# Patient Record
Sex: FEMALE | Race: WHITE | NOT HISPANIC OR LATINO | Employment: FULL TIME | URBAN - METROPOLITAN AREA
[De-identification: names, ages, dates, MRNs, and addresses within clinical notes are randomized per-mention and may not be internally consistent; named-entity substitution may affect disease eponyms.]

---

## 2021-08-04 ENCOUNTER — OFFICE VISIT (OUTPATIENT)
Dept: FAMILY MEDICINE CLINIC | Facility: CLINIC | Age: 61
End: 2021-08-04
Payer: COMMERCIAL

## 2021-08-04 VITALS
DIASTOLIC BLOOD PRESSURE: 88 MMHG | SYSTOLIC BLOOD PRESSURE: 130 MMHG | HEIGHT: 57 IN | RESPIRATION RATE: 16 BRPM | TEMPERATURE: 97.3 F | WEIGHT: 179 LBS | HEART RATE: 94 BPM | BODY MASS INDEX: 38.62 KG/M2 | OXYGEN SATURATION: 95 %

## 2021-08-04 DIAGNOSIS — Z12.11 COLON CANCER SCREENING: ICD-10-CM

## 2021-08-04 DIAGNOSIS — Z00.00 HEALTHCARE MAINTENANCE: Primary | ICD-10-CM

## 2021-08-04 DIAGNOSIS — I10 HYPERTENSION, UNSPECIFIED TYPE: ICD-10-CM

## 2021-08-04 PROCEDURE — 99386 PREV VISIT NEW AGE 40-64: CPT | Performed by: NURSE PRACTITIONER

## 2021-08-04 PROCEDURE — 3008F BODY MASS INDEX DOCD: CPT | Performed by: NURSE PRACTITIONER

## 2021-08-04 PROCEDURE — 3725F SCREEN DEPRESSION PERFORMED: CPT | Performed by: NURSE PRACTITIONER

## 2021-08-04 RX ORDER — LOSARTAN POTASSIUM 100 MG/1
100 TABLET ORAL DAILY
COMMUNITY
End: 2021-08-04 | Stop reason: SDUPTHER

## 2021-08-04 RX ORDER — HYDROCHLOROTHIAZIDE 25 MG/1
25 TABLET ORAL DAILY
COMMUNITY
End: 2021-08-04 | Stop reason: SDUPTHER

## 2021-08-04 RX ORDER — HYDROCHLOROTHIAZIDE 25 MG/1
25 TABLET ORAL DAILY
Qty: 90 TABLET | Refills: 0 | Status: SHIPPED | OUTPATIENT
Start: 2021-08-04 | End: 2021-11-23 | Stop reason: SDUPTHER

## 2021-08-04 RX ORDER — LOSARTAN POTASSIUM 100 MG/1
100 TABLET ORAL DAILY
Qty: 90 TABLET | Refills: 0 | Status: SHIPPED | OUTPATIENT
Start: 2021-08-04 | End: 2021-11-23 | Stop reason: SDUPTHER

## 2021-08-04 NOTE — ASSESSMENT & PLAN NOTE
-  Discussed immunizations, screening, healthy diet, exercise, and safety measures  -  She did get the [address] vaccine  -  Discussed importance of regular dental and vision exams   -  She has not been to the gynecologist in a few years but states she is going to make an appointment  She states that she is going to ask them for a script for a mammogram   -  Fit test ordered for colon cancer screening   -  Routine blood work ordered  Will contact patient with results

## 2021-08-04 NOTE — ASSESSMENT & PLAN NOTE
-  Well controlled on losartan and hydrochlorothiazide  Refill sent  -  Continue monitoring blood pressure at home  -  Will continue to monitor

## 2021-08-04 NOTE — PROGRESS NOTES
Assessment/Plan:    Healthcare maintenance  -  Discussed immunizations, screening, healthy diet, exercise, and safety measures  -  She did get the [address] vaccine  -  Discussed importance of regular dental and vision exams   -  She has not been to the gynecologist in a few years but states she is going to make an appointment  She states that she is going to ask them for a script for a mammogram   -  Fit test ordered for colon cancer screening   -  Routine blood work ordered  Will contact patient with results  Hypertension  -  Well controlled on losartan and hydrochlorothiazide  Refill sent  -  Continue monitoring blood pressure at home  -  Will continue to monitor  Diagnoses and all orders for this visit:    Healthcare maintenance  -     CBC and differential; Future  -     Comprehensive metabolic panel; Future  -     Lipid Panel with Direct LDL reflex; Future  -     TSH, 3rd generation with Free T4 reflex; Future    Hypertension, unspecified type  -     hydrochlorothiazide (HYDRODIURIL) 25 mg tablet; Take 1 tablet (25 mg total) by mouth daily  -     losartan (COZAAR) 100 MG tablet; Take 1 tablet (100 mg total) by mouth daily    Colon cancer screening  -     Occult Blood, Fecal Immunochemical; Future    Other orders  -     Discontinue: losartan (COZAAR) 100 MG tablet; Take 100 mg by mouth daily  -     Discontinue: hydrochlorothiazide (HYDRODIURIL) 25 mg tablet; Take 25 mg by mouth daily        Subjective:      Patient ID: Ajit Figueroa is a 59 y o  female  Patient with past medical history of hypertension presents today to establish care  She states that she does check her blood pressure at home and reports that her readings are controlled  She takes her medications daily and reports no side effects  She has no concerns or complaints today  M*Modal software was used to dictate this note  It may contain errors with dictating incorrect words/spelling   Please contact provider directly for any questions  The following portions of the patient's history were reviewed and updated as appropriate: allergies, current medications, past family history, past medical history, past social history, past surgical history and problem list     Review of Systems   Constitutional: Negative for fatigue and fever  HENT: Negative for congestion, rhinorrhea and trouble swallowing  Eyes: Negative for pain and visual disturbance  Respiratory: Negative for cough and shortness of breath  Cardiovascular: Negative for chest pain and palpitations  Gastrointestinal: Negative for abdominal pain and blood in stool  Endocrine: Negative for cold intolerance and heat intolerance  Genitourinary: Negative for difficulty urinating, dysuria and hematuria  Musculoskeletal: Negative for gait problem  Skin: Negative for rash  Neurological: Negative for dizziness, syncope and headaches  Hematological: Negative for adenopathy  Psychiatric/Behavioral: Negative for behavioral problems  Objective:      /88 (BP Location: Left arm, Patient Position: Sitting, Cuff Size: Adult)   Pulse 94   Temp (!) 97 3 °F (36 3 °C) (Tympanic)   Resp 16   Ht 4' 9" (1 448 m)   Wt 81 2 kg (179 lb)   SpO2 95%   BMI 38 74 kg/m²          Physical Exam  Vitals and nursing note reviewed  Constitutional:       Appearance: Normal appearance  HENT:      Head: Normocephalic and atraumatic  Right Ear: Tympanic membrane, ear canal and external ear normal       Left Ear: Tympanic membrane, ear canal and external ear normal       Nose: No congestion  Eyes:      Extraocular Movements: Extraocular movements intact  Conjunctiva/sclera: Conjunctivae normal       Pupils: Pupils are equal, round, and reactive to light  Cardiovascular:      Rate and Rhythm: Normal rate and regular rhythm  Heart sounds: Normal heart sounds     Pulmonary:      Effort: Pulmonary effort is normal       Breath sounds: Normal breath sounds  Abdominal:      General: Bowel sounds are normal       Palpations: Abdomen is soft  Tenderness: There is no abdominal tenderness  Musculoskeletal:         General: Normal range of motion  Cervical back: Normal range of motion  Right lower leg: No edema  Left lower leg: No edema  Lymphadenopathy:      Cervical: No cervical adenopathy  Skin:     General: Skin is warm and dry  Neurological:      Mental Status: She is alert and oriented to person, place, and time  Cranial Nerves: No cranial nerve deficit  Psychiatric:         Mood and Affect: Mood normal          Behavior: Behavior normal        BMI Counseling: Body mass index is 38 74 kg/m²  The BMI is above normal  Nutrition recommendations include decreasing portion sizes, encouraging healthy choices of fruits and vegetables, decreasing fast food intake and reducing intake of cholesterol  Exercise recommendations include moderate physical activity 150 minutes/week and exercising 3-5 times per week

## 2021-09-03 ENCOUNTER — TELEPHONE (OUTPATIENT)
Dept: FAMILY MEDICINE CLINIC | Facility: CLINIC | Age: 61
End: 2021-09-03

## 2021-09-03 DIAGNOSIS — Z12.31 SCREENING MAMMOGRAM, ENCOUNTER FOR: Primary | ICD-10-CM

## 2021-09-03 NOTE — TELEPHONE ENCOUNTER
Phone call from Fredy Lazo, returning [place] call  I advised her of Nadias message to get her BW & stool test done  Fredy Lazo stated that [facility] told her to get this done prior to her appt in 6 mo  I double checked with [facility] & she said that was ok  Then Fredy Lazo mentioned that she only got her prescriptions for 90 days, so she is going to go for her BW prior to her prescription running out

## 2021-10-18 ENCOUNTER — TELEPHONE (OUTPATIENT)
Dept: FAMILY MEDICINE CLINIC | Facility: CLINIC | Age: 61
End: 2021-10-18

## 2021-11-23 DIAGNOSIS — I10 HYPERTENSION, UNSPECIFIED TYPE: ICD-10-CM

## 2021-11-23 RX ORDER — LOSARTAN POTASSIUM 100 MG/1
100 TABLET ORAL DAILY
Qty: 90 TABLET | Refills: 0 | Status: SHIPPED | OUTPATIENT
Start: 2021-11-23 | End: 2022-05-16 | Stop reason: SDUPTHER

## 2021-11-23 RX ORDER — HYDROCHLOROTHIAZIDE 25 MG/1
25 TABLET ORAL DAILY
Qty: 90 TABLET | Refills: 0 | Status: SHIPPED | OUTPATIENT
Start: 2021-11-23 | End: 2022-05-16 | Stop reason: SDUPTHER

## 2022-05-05 ENCOUNTER — RA CDI HCC (OUTPATIENT)
Dept: OTHER | Facility: HOSPITAL | Age: 62
End: 2022-05-05

## 2022-05-05 NOTE — PROGRESS NOTES
Please review if the following dx  is applicable to the patient's condition and assess and document, if applicable in next visit on 05/13/2022    E66 01: Morbid (severe) obesity due to excess calories ([address] ) -     Per CMS/ICD 10 coding guidelines, BMI of 40 or higher; Class 3 obesity is sometimes categorized as "morbid," "extreme," or "severe" obesity      [address]  coding opportunities          Chart Reviewed number of suggestions sent to Provider: 1     Patients Insurance        Commercial Insurance: [employer]

## 2022-05-13 ENCOUNTER — OFFICE VISIT (OUTPATIENT)
Dept: FAMILY MEDICINE CLINIC | Facility: CLINIC | Age: 62
End: 2022-05-13
Payer: COMMERCIAL

## 2022-05-13 VITALS
TEMPERATURE: 98.4 F | DIASTOLIC BLOOD PRESSURE: 84 MMHG | BODY MASS INDEX: 35.51 KG/M2 | WEIGHT: 164.6 LBS | SYSTOLIC BLOOD PRESSURE: 128 MMHG | RESPIRATION RATE: 16 BRPM | OXYGEN SATURATION: 96 % | HEIGHT: 57 IN | HEART RATE: 96 BPM

## 2022-05-13 DIAGNOSIS — I10 HYPERTENSION, UNSPECIFIED TYPE: Primary | ICD-10-CM

## 2022-05-13 PROCEDURE — 99213 OFFICE O/P EST LOW 20 MIN: CPT | Performed by: NURSE PRACTITIONER

## 2022-05-13 PROCEDURE — 3725F SCREEN DEPRESSION PERFORMED: CPT | Performed by: NURSE PRACTITIONER

## 2022-05-13 PROCEDURE — 3008F BODY MASS INDEX DOCD: CPT | Performed by: NURSE PRACTITIONER

## 2022-05-13 NOTE — ASSESSMENT & PLAN NOTE
- Well controlled on losartan and hydrochlorothiazide  Continue same    - Continue to monitor blood pressure at home

## 2022-05-13 NOTE — PROGRESS NOTES
Assessment/Plan:    Hypertension  - Well controlled on losartan and hydrochlorothiazide  Continue same    - Continue to monitor blood pressure at home  Diagnoses and all orders for this visit:    Hypertension, unspecified type        Subjective:      Patient ID: Landy Manuel is a 59 y o  female  Patient presents today for follow up appointment  She has a history of hypertension and is taking losartan and hydrochlorothiazide  Her blood pressure is well controlled today  She does monitor her blood pressure at home and reports readings of 695'D systolic and 26'J diastolic  She is making an effort to lose weight to hopefully come off of some of her medication  She has lost 15 pounds since she was seen last in August          The following portions of the patient's history were reviewed and updated as appropriate: allergies, current medications, past family history, past medical history, past social history, past surgical history and problem list     Review of Systems   Constitutional: Negative for fatigue and fever  HENT: Negative for trouble swallowing  Eyes: Negative for visual disturbance  Respiratory: Negative for cough and shortness of breath  Cardiovascular: Negative for chest pain and palpitations  Gastrointestinal: Negative for abdominal pain and blood in stool  Endocrine: Negative for cold intolerance and heat intolerance  Genitourinary: Negative for difficulty urinating and dysuria  Musculoskeletal: Negative for gait problem  Skin: Negative for rash  Neurological: Negative for dizziness, syncope and headaches  Hematological: Negative for adenopathy  Psychiatric/Behavioral: Negative for behavioral problems           Objective:      /84 (BP Location: Left arm, Patient Position: Sitting, Cuff Size: Large)   Pulse 96   Temp 98 4 °F (36 9 °C) (Tympanic)   Resp 16   Ht 4' 9" (1 448 m)   Wt 74 7 kg (164 lb 9 6 oz)   SpO2 96%   BMI 35 62 kg/m²          Physical Exam  Vitals and nursing note reviewed  Constitutional:       Appearance: Normal appearance  HENT:      Head: Normocephalic and atraumatic  Right Ear: External ear normal       Left Ear: External ear normal    Eyes:      Conjunctiva/sclera: Conjunctivae normal    Cardiovascular:      Rate and Rhythm: Normal rate and regular rhythm  Heart sounds: Normal heart sounds  Pulmonary:      Effort: Pulmonary effort is normal       Breath sounds: Normal breath sounds  Musculoskeletal:         General: Normal range of motion  Cervical back: Normal range of motion  Skin:     General: Skin is warm and dry  Neurological:      Mental Status: She is alert and oriented to person, place, and time  Cranial Nerves: No cranial nerve deficit     Psychiatric:         Mood and Affect: Mood normal          Behavior: Behavior normal

## 2022-05-16 DIAGNOSIS — I10 HYPERTENSION, UNSPECIFIED TYPE: ICD-10-CM

## 2022-05-16 RX ORDER — LOSARTAN POTASSIUM 100 MG/1
100 TABLET ORAL DAILY
Qty: 90 TABLET | Refills: 1 | Status: SHIPPED | OUTPATIENT
Start: 2022-05-16

## 2022-05-16 RX ORDER — HYDROCHLOROTHIAZIDE 25 MG/1
25 TABLET ORAL DAILY
Qty: 90 TABLET | Refills: 1 | Status: SHIPPED | OUTPATIENT
Start: 2022-05-16

## 2022-10-11 ENCOUNTER — VBI (OUTPATIENT)
Dept: ADMINISTRATIVE | Facility: OTHER | Age: 62
End: 2022-10-11

## 2022-10-12 PROBLEM — Z00.00 HEALTHCARE MAINTENANCE: Status: RESOLVED | Noted: 2021-08-04 | Resolved: 2022-10-12

## 2023-01-27 ENCOUNTER — RA CDI HCC (OUTPATIENT)
Dept: OTHER | Facility: HOSPITAL | Age: 63
End: 2023-01-27

## 2023-01-27 NOTE — PROGRESS NOTES
[facility]  coding opportunities       Chart reviewed, no opportunity found: CHART REVIEWED, NO OPPORTUNITY FOUND        Patients Insurance        Commercial Insurance: [address] [place] [address]

## 2023-02-03 ENCOUNTER — OFFICE VISIT (OUTPATIENT)
Dept: FAMILY MEDICINE CLINIC | Facility: CLINIC | Age: 63
End: 2023-02-03

## 2023-02-03 VITALS
DIASTOLIC BLOOD PRESSURE: 70 MMHG | RESPIRATION RATE: 16 BRPM | BODY MASS INDEX: 31.93 KG/M2 | HEIGHT: 57 IN | HEART RATE: 102 BPM | TEMPERATURE: 98.2 F | OXYGEN SATURATION: 97 % | SYSTOLIC BLOOD PRESSURE: 112 MMHG | WEIGHT: 148 LBS

## 2023-02-03 DIAGNOSIS — K59.00 CONSTIPATION, UNSPECIFIED CONSTIPATION TYPE: ICD-10-CM

## 2023-02-03 DIAGNOSIS — Z00.00 HEALTHCARE MAINTENANCE: ICD-10-CM

## 2023-02-03 DIAGNOSIS — Z12.11 ENCOUNTER FOR SCREENING FOR MALIGNANT NEOPLASM OF COLON: ICD-10-CM

## 2023-02-03 DIAGNOSIS — R30.9 PAINFUL URINATION: ICD-10-CM

## 2023-02-03 DIAGNOSIS — K64.9 HEMORRHOIDS, UNSPECIFIED HEMORRHOID TYPE: ICD-10-CM

## 2023-02-03 DIAGNOSIS — Z12.31 ENCOUNTER FOR SCREENING MAMMOGRAM FOR MALIGNANT NEOPLASM OF BREAST: ICD-10-CM

## 2023-02-03 DIAGNOSIS — I10 HYPERTENSION, UNSPECIFIED TYPE: Primary | ICD-10-CM

## 2023-02-03 LAB
BACTERIA UR QL AUTO: ABNORMAL /HPF
BILIRUB UR QL STRIP: NEGATIVE
CLARITY UR: ABNORMAL
COLOR UR: YELLOW
GLUCOSE UR STRIP-MCNC: NEGATIVE MG/DL
HGB UR QL STRIP.AUTO: NEGATIVE
KETONES UR STRIP-MCNC: NEGATIVE MG/DL
LEUKOCYTE ESTERASE UR QL STRIP: NEGATIVE
MUCOUS THREADS UR QL AUTO: ABNORMAL
NITRITE UR QL STRIP: NEGATIVE
NON-SQ EPI CELLS URNS QL MICRO: ABNORMAL /HPF
PH UR STRIP.AUTO: 6.5 [PH]
PROT UR STRIP-MCNC: ABNORMAL MG/DL
RBC #/AREA URNS AUTO: ABNORMAL /HPF
SL AMB  POCT GLUCOSE, UA: ABNORMAL
SL AMB LEUKOCYTE ESTERASE,UA: ABNORMAL
SL AMB POCT BILIRUBIN,UA: ABNORMAL
SL AMB POCT BLOOD,UA: ABNORMAL
SL AMB POCT CLARITY,UA: CLEAR
SL AMB POCT COLOR,UA: ABNORMAL
SL AMB POCT KETONES,UA: ABNORMAL
SL AMB POCT NITRITE,UA: ABNORMAL
SL AMB POCT PH,UA: 6
SL AMB POCT SPECIFIC GRAVITY,UA: 1.02
SL AMB POCT URINE PROTEIN: ABNORMAL
SL AMB POCT UROBILINOGEN: POSITIVE
SP GR UR STRIP.AUTO: 1.02 (ref 1–1.03)
UROBILINOGEN UR STRIP-ACNC: 3 MG/DL
WBC #/AREA URNS AUTO: ABNORMAL /HPF

## 2023-02-03 RX ORDER — LOSARTAN POTASSIUM 100 MG/1
100 TABLET ORAL DAILY
Qty: 90 TABLET | Refills: 1 | Status: SHIPPED | OUTPATIENT
Start: 2023-02-03 | End: 2023-02-06

## 2023-02-03 RX ORDER — HYDROCORTISONE 25 MG/G
CREAM TOPICAL 2 TIMES DAILY
Qty: 28 G | Refills: 1 | Status: SHIPPED | OUTPATIENT
Start: 2023-02-03 | End: 2023-02-09 | Stop reason: SDUPTHER

## 2023-02-03 RX ORDER — HYDROCHLOROTHIAZIDE 25 MG/1
25 TABLET ORAL DAILY
Qty: 90 TABLET | Refills: 1 | Status: SHIPPED | OUTPATIENT
Start: 2023-02-03 | End: 2023-02-06

## 2023-02-03 RX ORDER — HYDROCORTISONE ACETATE 25 MG/1
25 SUPPOSITORY RECTAL 2 TIMES DAILY
Qty: 12 SUPPOSITORY | Refills: 0 | Status: SHIPPED | OUTPATIENT
Start: 2023-02-03 | End: 2023-02-09 | Stop reason: SDUPTHER

## 2023-02-03 NOTE — ASSESSMENT & PLAN NOTE
- Recommended use of Colace and Miralax  - Increase oral hydration and fiber in diet  - Contact office if no improvement

## 2023-02-03 NOTE — ASSESSMENT & PLAN NOTE
- Prescription sent for Anusol cream and suppository  - Recommended Colace and increased oral hydration    - Contact office if no improvement

## 2023-02-03 NOTE — ASSESSMENT & PLAN NOTE
- Well controlled on losartan and hydrochlorothiazide daily  Continue same    - Will continue to monitor

## 2023-02-03 NOTE — ASSESSMENT & PLAN NOTE
- Reviewed immunizations and screenings  - Discussed routine dental, vision, and GYN exams  Needs tooth extractions  Needs new GYN  Wants to wait until she gets Medicare in June  - Routine labs, mammogram, and cologuard ordered

## 2023-02-03 NOTE — PROGRESS NOTES
Name: Greta Petersen      : 1960      MRN: [id number]  Encounter Provider: FRANCISCA Cortez  Encounter Date: 2/3/2023   Encounter department[facility]  PRIMARY CARE    Assessment & Plan     1  Hypertension, unspecified type  Assessment & Plan:  - Well controlled on losartan and hydrochlorothiazide daily  Continue same    - Will continue to monitor  Orders:  -     losartan (COZAAR) 100 MG tablet; Take 1 tablet (100 mg total) by mouth daily  -     hydrochlorothiazide (HYDRODIURIL) 25 mg tablet; Take 1 tablet (25 mg total) by mouth daily  -     CBC and differential; Future  -     Comprehensive metabolic panel; Future  -     Lipid Panel with Direct LDL reflex; Future  -     TSH, 3rd generation with Free T4 reflex; Future    2  Hemorrhoids, unspecified hemorrhoid type  Assessment & Plan:  - Prescription sent for Anusol cream and suppository  - Recommended Colace and increased oral hydration    - Contact office if no improvement  Orders:  -     hydrocortisone (ANUSOL-HC) 2 5 % rectal cream; Apply topically 2 (two) times a day  -     hydrocortisone (ANUSOL-HC) 25 mg suppository; Insert 1 suppository (25 mg total) into the rectum 2 (two) times a day    3  Painful urination  Assessment & Plan:  - Urine dip in office unremarkable  Will send for culture and continue to follow up with results  Orders:  -     POCT urine dip  -     UA w Reflex to Microscopic w Reflex to Culture - Clinic Collect  -     Urine culture; Future  -     Urine culture    4  Constipation, unspecified constipation type  Assessment & Plan:  - Recommended use of Colace and Miralax  - Increase oral hydration and fiber in diet  - Contact office if no improvement  5  Encounter for screening mammogram for malignant neoplasm of breast  -     Mammo screening bilateral w 3d & cad; Future; Expected date: 2023    6  Encounter for screening for malignant neoplasm of colon  -     Cologuard    7  Healthcare maintenance  Assessment & Plan:  - Reviewed immunizations and screenings  - Discussed routine dental, vision, and GYN exams  Needs tooth extractions  Needs new GYN  Wants to wait until she gets Medicare in June  - Routine labs, mammogram, and cologuard ordered  Orders:  -     CBC and differential; Future  -     Comprehensive metabolic panel; Future  -     Lipid Panel with Direct LDL reflex; Future  -     TSH, 3rd generation with Free T4 reflex; Future           Subjective     Patient with PMH of hypertension presents today for physical  She is taking her prescribed medication and reports no side effects  She has complaints today of hemorrhoids, constipation, and pelvic pain  Has had the pelvic pain for about 2 weeks  It is sharp in nature  On right side only  States it hurts when she starts and ends her urine stream  Denies urgency, frequency, fever, chills, or flank pain  Has had some weight loss recently due to diet changes  Has been unable to eat hard foods due to teeth issues  Has to make an appt for tooth extractions  Also needs blood work  Currently her insurance has a $6,000 deductible  She gets Medicare on June 1st and is planning on getting labs and testing done after that  She denies any other concerns or complaints today  Review of Systems   Constitutional: Negative for fatigue and fever  HENT: Positive for dental problem  Negative for postnasal drip and trouble swallowing  Eyes: Negative for visual disturbance  Respiratory: Negative for cough and shortness of breath  Cardiovascular: Negative for chest pain and palpitations  Gastrointestinal: Positive for constipation  Negative for abdominal pain and blood in stool  Hemorrhoids    Endocrine: Negative for cold intolerance and heat intolerance  Genitourinary: Positive for dysuria and pelvic pain  Negative for decreased urine volume, difficulty urinating, flank pain, frequency and urgency     Musculoskeletal: Negative for gait problem  Skin: Negative for rash  Neurological: Negative for dizziness, syncope and headaches  Hematological: Negative for adenopathy  Psychiatric/Behavioral: Negative for behavioral problems  Past Medical History:   Diagnosis Date   • Hypertension      History reviewed  No pertinent surgical history  History reviewed  No pertinent family history  Social History     Socioeconomic History   • Marital status: /Civil Union     Spouse name: None   • Number of children: None   • Years of education: None   • Highest education level: None   Occupational History   • None   Tobacco Use   • Smoking status: Every Day     Packs/day: 1 00     Types: Cigarettes   • Smokeless tobacco: Never   Vaping Use   • Vaping Use: Never used   Substance and Sexual Activity   • Alcohol use: Never   • Drug use: Never   • Sexual activity: None   Other Topics Concern   • None   Social History Narrative   • None     Social Determinants of Health     Financial Resource Strain: Not on file   Food Insecurity: Not on file   Transportation Needs: Not on file   Physical Activity: Not on file   Stress: Not on file   Social Connections: Not on file   Intimate Partner Violence: Not on file   Housing Stability: Not on file     Current Outpatient Medications on File Prior to Visit   Medication Sig   • [DISCONTINUED] hydrochlorothiazide (HYDRODIURIL) 25 mg tablet Take 1 tablet (25 mg total) by mouth in the morning  • [DISCONTINUED] losartan (COZAAR) 100 MG tablet Take 1 tablet (100 mg total) by mouth in the morning       Allergies   Allergen Reactions   • Acetaminophen Hives   • Penicillins Hives     Immunization History   Administered Date(s) Administered   • COVID-19 MODERNA VACC 0 5 ML IM 03/27/2021, 12/17/2021       Objective     /70 (BP Location: Left arm, Patient Position: Sitting, Cuff Size: Large)   Pulse 102   Temp 98 2 °F (36 8 °C) (Tympanic)   Resp 16   Ht 4' 9" (1 448 m)   Wt 67 1 kg (148 lb) SpO2 97%   BMI 32 03 kg/m²     Physical Exam  Vitals and nursing note reviewed  Constitutional:       General: She is not in acute distress  Appearance: Normal appearance  She is not ill-appearing  HENT:      Head: Normocephalic and atraumatic  Right Ear: Tympanic membrane, ear canal and external ear normal       Left Ear: Tympanic membrane, ear canal and external ear normal       Nose: No congestion  Eyes:      Extraocular Movements: Extraocular movements intact  Conjunctiva/sclera: Conjunctivae normal       Pupils: Pupils are equal, round, and reactive to light  Cardiovascular:      Rate and Rhythm: Normal rate and regular rhythm  Heart sounds: Normal heart sounds  Pulmonary:      Effort: Pulmonary effort is normal       Breath sounds: Normal breath sounds  Abdominal:      General: Bowel sounds are normal       Palpations: Abdomen is soft  Tenderness: There is abdominal tenderness in the suprapubic area  Negative signs include McBurney's sign  Musculoskeletal:         General: Normal range of motion  Cervical back: Normal range of motion  Lymphadenopathy:      Cervical: No cervical adenopathy  Skin:     General: Skin is warm and dry  Neurological:      Mental Status: She is alert and oriented to person, place, and time  Cranial Nerves: No cranial nerve deficit     Psychiatric:         Mood and Affect: Mood normal          Behavior: Behavior normal        FRANCISCA Abel

## 2023-02-04 DIAGNOSIS — I10 HYPERTENSION, UNSPECIFIED TYPE: ICD-10-CM

## 2023-02-04 LAB — BACTERIA UR CULT: NORMAL

## 2023-02-06 RX ORDER — HYDROCHLOROTHIAZIDE 25 MG/1
TABLET ORAL
Qty: 90 TABLET | Refills: 1 | Status: SHIPPED | OUTPATIENT
Start: 2023-02-06

## 2023-02-06 RX ORDER — LOSARTAN POTASSIUM 100 MG/1
TABLET ORAL
Qty: 90 TABLET | Refills: 1 | Status: SHIPPED | OUTPATIENT
Start: 2023-02-06

## 2023-02-07 ENCOUNTER — TELEPHONE (OUTPATIENT)
Dept: FAMILY MEDICINE CLINIC | Facility: CLINIC | Age: 63
End: 2023-02-07

## 2023-02-07 NOTE — TELEPHONE ENCOUNTER
----- [facility] at 2/7/2023  7:05 AM EST -----  Urine culture showed no UTI

## 2023-02-09 ENCOUNTER — TELEPHONE (OUTPATIENT)
Dept: FAMILY MEDICINE CLINIC | Facility: CLINIC | Age: 63
End: 2023-02-09

## 2023-02-09 DIAGNOSIS — K64.9 HEMORRHOIDS, UNSPECIFIED HEMORRHOID TYPE: ICD-10-CM

## 2023-02-09 RX ORDER — HYDROCORTISONE 25 MG/G
CREAM TOPICAL 2 TIMES DAILY
Qty: 28 G | Refills: 1 | Status: SHIPPED | OUTPATIENT
Start: 2023-02-09

## 2023-02-09 RX ORDER — HYDROCORTISONE ACETATE 25 MG/1
25 SUPPOSITORY RECTAL 2 TIMES DAILY
Qty: 12 SUPPOSITORY | Refills: 0 | Status: SHIPPED | OUTPATIENT
Start: 2023-02-09

## 2023-02-09 NOTE — TELEPHONE ENCOUNTER
Two prescriptions were sent in error to [facility] in [facility] instead of [facility]  Both prescriptions for Anusol need to be resent to [facility]  Patient has made a couple trips to [facility] for these

## 2023-04-04 PROBLEM — Z12.11 ENCOUNTER FOR SCREENING FOR MALIGNANT NEOPLASM OF COLON: Status: RESOLVED | Noted: 2023-02-03 | Resolved: 2023-04-04

## 2023-04-04 PROBLEM — Z00.00 HEALTHCARE MAINTENANCE: Status: RESOLVED | Noted: 2023-02-03 | Resolved: 2023-04-04

## 2023-05-17 ENCOUNTER — OFFICE VISIT (OUTPATIENT)
Dept: FAMILY MEDICINE CLINIC | Facility: CLINIC | Age: 63
End: 2023-05-17

## 2023-05-17 VITALS
SYSTOLIC BLOOD PRESSURE: 126 MMHG | OXYGEN SATURATION: 96 % | BODY MASS INDEX: 29.77 KG/M2 | HEIGHT: 57 IN | DIASTOLIC BLOOD PRESSURE: 80 MMHG | WEIGHT: 138 LBS | HEART RATE: 71 BPM | RESPIRATION RATE: 16 BRPM | TEMPERATURE: 97.9 F

## 2023-05-17 DIAGNOSIS — I10 HYPERTENSION, UNSPECIFIED TYPE: ICD-10-CM

## 2023-05-17 DIAGNOSIS — K64.9 HEMORRHOIDS, UNSPECIFIED HEMORRHOID TYPE: ICD-10-CM

## 2023-05-17 DIAGNOSIS — R63.4 WEIGHT LOSS: ICD-10-CM

## 2023-05-17 DIAGNOSIS — R53.83 OTHER FATIGUE: ICD-10-CM

## 2023-05-17 DIAGNOSIS — R35.0 URINARY FREQUENCY: Primary | ICD-10-CM

## 2023-05-17 DIAGNOSIS — R10.2 PELVIC PAIN: ICD-10-CM

## 2023-05-17 LAB
SL AMB  POCT GLUCOSE, UA: ABNORMAL
SL AMB LEUKOCYTE ESTERASE,UA: POSITIVE
SL AMB POCT BILIRUBIN,UA: ABNORMAL
SL AMB POCT BLOOD,UA: POSITIVE
SL AMB POCT CLARITY,UA: ABNORMAL
SL AMB POCT COLOR,UA: ABNORMAL
SL AMB POCT KETONES,UA: POSITIVE
SL AMB POCT NITRITE,UA: POSITIVE
SL AMB POCT PH,UA: 6
SL AMB POCT SPECIFIC GRAVITY,UA: 1.02
SL AMB POCT URINE PROTEIN: POSITIVE
SL AMB POCT UROBILINOGEN: POSITIVE

## 2023-05-17 RX ORDER — NITROFURANTOIN 25; 75 MG/1; MG/1
100 CAPSULE ORAL 2 TIMES DAILY
Qty: 10 CAPSULE | Refills: 0 | Status: SHIPPED | OUTPATIENT
Start: 2023-05-17 | End: 2023-05-22

## 2023-05-17 RX ORDER — HYDROCORTISONE 25 MG/G
CREAM TOPICAL 2 TIMES DAILY
Qty: 28 G | Refills: 1 | Status: SHIPPED | OUTPATIENT
Start: 2023-05-17

## 2023-05-17 RX ORDER — HYDROCORTISONE ACETATE 25 MG/1
25 SUPPOSITORY RECTAL 2 TIMES DAILY
Qty: 12 SUPPOSITORY | Refills: 0 | Status: SHIPPED | OUTPATIENT
Start: 2023-05-17

## 2023-05-17 NOTE — ASSESSMENT & PLAN NOTE
- Patient has been off losartan and hydrochlorothiazide for 1 5 weeks  Blood pressures stable at home  Improved BP likely due to weight loss  - Monitor off medications for now and record readings at home  - Follow up in 1 month

## 2023-05-17 NOTE — PROGRESS NOTES
Name: Chelly Peck      : 1960      MRN: [id number]  Encounter Provider: FRANCISCA Medina  Encounter Date: 2023   Encounter department: [address] [place] [address] PRIMARY CARE    Assessment & Plan     1  Urinary frequency  Assessment & Plan:  - Urine dip positive for leuks, nitrites, protein, blood, and ketones  Will send for culture  - Prescription sent for Macrobid  Discussed side effects  Will change antibiotics based on urine culture results if necessary    - Increase oral hydration    - Will continue to follow up  Orders:  -     POCT urine dip  -     Urine culture; Future  -     Urine culture  -     nitrofurantoin (MACROBID) 100 mg capsule; Take 1 capsule (100 mg total) by mouth 2 (two) times a day for 5 days    2  Pelvic pain  Assessment & Plan:  - Will treat for suspected UTI  - If no improvement consider ultrasound  - Follow up in 1 month  3  Hypertension, unspecified type  Assessment & Plan:  - Patient has been off losartan and hydrochlorothiazide for 1 5 weeks  Blood pressures stable at home  Improved BP likely due to weight loss  - Monitor off medications for now and record readings at home  - Follow up in 1 month  Orders:  -     CBC and differential; Future  -     Comprehensive metabolic panel; Future  -     Lipid Panel with Direct LDL reflex; Future  -     TSH, 3rd generation with Free T4 reflex; Future    4  Hemorrhoids, unspecified hemorrhoid type  Assessment & Plan:  - Refills sent for Anusol cream and suppository  - Increase oral hydration    - Recommend Colace  - Consider referral to Colorectal surgeon  Orders:  -     hydrocortisone (ANUSOL-HC) 25 mg suppository; Insert 1 suppository (25 mg total) into the rectum 2 (two) times a day  -     hydrocortisone (ANUSOL-HC) 2 5 % rectal cream; Apply topically 2 (two) times a day    5   Weight loss  Assessment & Plan:  - Likely due to dental problem - can only eat soft foods   - She plans on seeing a dentist   - Will obtain labs and continue to follow up  Orders:  -     CBC and differential; Future  -     Comprehensive metabolic panel; Future  -     Lipid Panel with Direct LDL reflex; Future  -     TSH, 3rd generation with Free T4 reflex; Future    6  Other fatigue  Assessment & Plan:  - Will obtain labs and continue to follow up  Orders:  -     CBC and differential; Future  -     Iron Panel (Includes Ferritin, Iron Sat%, Iron, and TIBC); Future           Subjective     Patient presents today with complaints of pelvic pain, groin pain, and urinary frequency  States the pelvic and groin pain has been occurring intermittently since February  The urinary symptoms have been more recent  Has dysuria at the end of her stream  Complains of frequency and is waking up in the middle of the night sometimes 2-3 times to use the restroom  Also complains of foul smelling urine  She also has complaints today of weight loss  She is down 26 pounds since this time last year  She attributes her weight loss to her teeth and being unable to eat  She needs a lot of dental work and can only eat soft foods  Is going to see a dentist but waiting until she gets Medicare  Endorses occasional chills and fatigue but denies night sweats  She has also been off of her blood pressure medication for about 1 5 weeks  She stopped it because she thought it might be affecting her kidneys  She has been monitoring her blood pressure at home and reports readings of 160-624P systolic and 22-90G diastolic  Review of Systems   Constitutional: Positive for chills, fatigue and unexpected weight change (weight loss)  Negative for fever  HENT: Positive for dental problem  Negative for trouble swallowing  Eyes: Negative for visual disturbance  Respiratory: Negative for cough and shortness of breath  Cardiovascular: Negative for chest pain and palpitations  Gastrointestinal: Positive for abdominal pain (pelvic pain, groin pain)  Negative for blood in stool  Hemorrhoids    Endocrine: Negative for cold intolerance and heat intolerance  Genitourinary: Positive for dysuria and frequency  Negative for difficulty urinating  Foul smelling urine   Musculoskeletal: Negative for gait problem  Skin: Negative for rash  Neurological: Negative for dizziness, syncope and headaches  Hematological: Negative for adenopathy  Psychiatric/Behavioral: Negative for behavioral problems  Past Medical History:   Diagnosis Date   • Hypertension      History reviewed  No pertinent surgical history  History reviewed  No pertinent family history    Social History     Socioeconomic History   • Marital status: /Civil Union     Spouse name: None   • Number of children: None   • Years of education: None   • Highest education level: None   Occupational History   • None   Tobacco Use   • Smoking status: Every Day     Packs/day: 1 00     Types: Cigarettes   • Smokeless tobacco: Never   Vaping Use   • Vaping Use: Never used   Substance and Sexual Activity   • Alcohol use: Never   • Drug use: Never   • Sexual activity: None   Other Topics Concern   • None   Social History Narrative   • None     Social Determinants of Health     Financial Resource Strain: Not on file   Food Insecurity: Not on file   Transportation Needs: Not on file   Physical Activity: Not on file   Stress: Not on file   Social Connections: Not on file   Intimate Partner Violence: Not on file   Housing Stability: Not on file     Current Outpatient Medications on File Prior to Visit   Medication Sig   • hydrochlorothiazide (HYDRODIURIL) 25 mg tablet TAKE ONE TABLET BY MOUTH EVERY MORNING   • losartan (COZAAR) 100 MG tablet TAKE ONE TABLET BY MOUTH EVERY MORNING   • [DISCONTINUED] hydrocortisone (ANUSOL-HC) 2 5 % rectal cream Apply topically 2 (two) times a day   • [DISCONTINUED] hydrocortisone (ANUSOL-HC) 25 mg suppository Insert 1 suppository (25 mg total) into the rectum 2 "(two) times a day     Allergies   Allergen Reactions   • Acetaminophen Hives   • Penicillins Hives     Immunization History   Administered Date(s) Administered   • COVID-19 MODERNA VACC 0 5 ML IM 03/27/2021, 12/17/2021       Objective     /80 (BP Location: Left arm, Patient Position: Sitting, Cuff Size: Adult)   Pulse 71   Temp 97 9 °F (36 6 °C) (Tympanic)   Resp 16   Ht 4' 9\" (1 448 m)   Wt 62 6 kg (138 lb)   SpO2 96%   BMI 29 86 kg/m²     Physical Exam  Vitals and nursing note reviewed  Constitutional:       General: She is not in acute distress  Appearance: She is well-developed  HENT:      Head: Normocephalic and atraumatic  Right Ear: External ear normal       Left Ear: External ear normal    Eyes:      Conjunctiva/sclera: Conjunctivae normal    Cardiovascular:      Rate and Rhythm: Normal rate and regular rhythm  Heart sounds: Normal heart sounds  Pulmonary:      Effort: Pulmonary effort is normal       Breath sounds: Normal breath sounds  Abdominal:      Tenderness: There is abdominal tenderness in the suprapubic area  Hernia: A hernia (ventral hernia superior to umbilicus ) is present  Musculoskeletal:         General: Normal range of motion  Cervical back: Normal range of motion  Lymphadenopathy:      Cervical: No cervical adenopathy  Skin:     General: Skin is warm and dry  Neurological:      Mental Status: She is alert and oriented to person, place, and time  Cranial Nerves: No cranial nerve deficit     Psychiatric:         Mood and Affect: Mood normal          Behavior: Behavior normal        FRANCISCA Webb  "

## 2023-05-17 NOTE — ASSESSMENT & PLAN NOTE
- Likely due to dental problem - can only eat soft foods   - She plans on seeing a dentist   - Will obtain labs and continue to follow up

## 2023-05-17 NOTE — ASSESSMENT & PLAN NOTE
- Urine dip positive for leuks, nitrites, protein, blood, and ketones  Will send for culture  - Prescription sent for Macrobid  Discussed side effects  Will change antibiotics based on urine culture results if necessary    - Increase oral hydration    - Will continue to follow up

## 2023-05-17 NOTE — ASSESSMENT & PLAN NOTE
- Refills sent for Anusol cream and suppository  - Increase oral hydration    - Recommend Colace  - Consider referral to Colorectal surgeon

## 2023-05-19 LAB — BACTERIA UR CULT: ABNORMAL

## 2023-05-22 ENCOUNTER — VBI (OUTPATIENT)
Dept: ADMINISTRATIVE | Facility: OTHER | Age: 63
End: 2023-05-22

## 2023-05-23 ENCOUNTER — TELEPHONE (OUTPATIENT)
Dept: FAMILY MEDICINE CLINIC | Facility: CLINIC | Age: 63
End: 2023-05-23

## 2023-05-23 NOTE — TELEPHONE ENCOUNTER
Patient returned call and said she finished her antibiotics but is not any better   Asking what type of infection she has    Denise Cabello will return tomorrow, Wednesday 5/24/23

## 2023-05-23 NOTE — TELEPHONE ENCOUNTER
----- [facility] sent at 5/23/2023  8:42 AM EDT -----  Urine culture was positive for UTI  Make sure she finished course of antibiotics

## 2023-05-23 NOTE — TELEPHONE ENCOUNTER
----- [facility] sent at 5/23/2023  8:42 AM EDT -----  Urine culture was positive for UTI  Make sure she finished course of antibiotics

## 2023-05-24 ENCOUNTER — OFFICE VISIT (OUTPATIENT)
Dept: URGENT CARE | Facility: MEDICAL CENTER | Age: 63
End: 2023-05-24

## 2023-05-24 VITALS
OXYGEN SATURATION: 98 % | HEART RATE: 106 BPM | DIASTOLIC BLOOD PRESSURE: 94 MMHG | SYSTOLIC BLOOD PRESSURE: 169 MMHG | HEIGHT: 57 IN | WEIGHT: 138 LBS | RESPIRATION RATE: 18 BRPM | TEMPERATURE: 98.4 F | BODY MASS INDEX: 29.77 KG/M2

## 2023-05-24 DIAGNOSIS — R30.0 DYSURIA: Primary | ICD-10-CM

## 2023-05-24 LAB
SL AMB  POCT GLUCOSE, UA: ABNORMAL
SL AMB LEUKOCYTE ESTERASE,UA: ABNORMAL
SL AMB POCT BILIRUBIN,UA: ABNORMAL
SL AMB POCT BLOOD,UA: ABNORMAL
SL AMB POCT CLARITY,UA: ABNORMAL
SL AMB POCT COLOR,UA: ABNORMAL
SL AMB POCT KETONES,UA: 15
SL AMB POCT NITRITE,UA: ABNORMAL
SL AMB POCT PH,UA: 6.5
SL AMB POCT SPECIFIC GRAVITY,UA: 1.01
SL AMB POCT URINE PROTEIN: 300
SL AMB POCT UROBILINOGEN: 8

## 2023-05-24 RX ORDER — CIPROFLOXACIN 500 MG/1
500 TABLET, FILM COATED ORAL EVERY 12 HOURS SCHEDULED
Qty: 14 TABLET | Refills: 0 | Status: SHIPPED | OUTPATIENT
Start: 2023-05-24 | End: 2023-05-31

## 2023-05-24 NOTE — PROGRESS NOTES
[facility]        NAME: Grisel Trevino is a 57 y o  female  : 1960    MRN: [id number]  DATE: May 24, 2023  TIME: 6:04 PM    Assessment and Plan   Dysuria [R30 0]  1  Dysuria  POCT urine dip    Urine culture    ciprofloxacin (CIPRO) 500 mg tablet            Patient Instructions     Dysuria  cipro twice daily  Follow up with PCP in 3-5 days  Proceed to  ER if symptoms worsen  Chief Complaint     Chief Complaint   Patient presents with   • Possible UTI     Pt  C/O pelvic pain, dysuria, urinary frequency that began a week ago  She was seen by her PCP and treated with antibiotics, but continues to have symptoms  History of Present Illness       26-year-old female who presents complaining of frequency  ,  Urgency, dysuria, and suprapubic pain x3 days  Patient states that she was recently treated for antibiotics and finished her antibiotics yesterday but the symptoms are still there  Denies fevers, chills, chest pain      Review of Systems   Review of Systems   Constitutional: Negative  HENT: Negative  Eyes: Negative  Respiratory: Negative  Negative for cough, chest tightness, shortness of breath, wheezing and stridor  Cardiovascular: Negative  Negative for chest pain, palpitations and leg swelling  Gastrointestinal: Negative  Genitourinary: Positive for dysuria, frequency and urgency  Negative for decreased urine volume, difficulty urinating, dyspareunia, enuresis, flank pain, genital sores, hematuria, menstrual problem, pelvic pain, vaginal bleeding, vaginal discharge and vaginal pain           Current Medications       Current Outpatient Medications:   •  ciprofloxacin (CIPRO) 500 mg tablet, Take 1 tablet (500 mg total) by mouth every 12 (twelve) hours for 7 days, Disp: 14 tablet, Rfl: 0  •  hydrocortisone (ANUSOL-HC) 2 5 % rectal cream, Apply topically 2 (two) times a day, Disp: 28 g, Rfl: 1  •  hydrocortisone (ANUSOL-HC) 25 mg suppository, Insert 1 suppository "(25 mg total) into the rectum 2 (two) times a day, Disp: 12 suppository, Rfl: 0  •  hydrochlorothiazide (HYDRODIURIL) 25 mg tablet, TAKE ONE TABLET BY MOUTH EVERY MORNING (Patient not taking: Reported on 5/24/2023), Disp: 90 tablet, Rfl: 1  •  losartan (COZAAR) 100 MG tablet, TAKE ONE TABLET BY MOUTH EVERY MORNING (Patient not taking: Reported on 5/24/2023), Disp: 90 tablet, Rfl: 1    Current Allergies     Allergies as of 05/24/2023 - Reviewed 05/24/2023   Allergen Reaction Noted   • Acetaminophen Hives 03/16/2018   • Penicillins Hives 03/16/2018            The following portions of the patient's history were reviewed and updated as appropriate: allergies, current medications, past family history, past medical history, past social history, past surgical history and problem list      Past Medical History:   Diagnosis Date   • Hypertension        No past surgical history on file  No family history on file  Medications have been verified  Objective   /94   Pulse (!) 106   Temp 98 4 °F (36 9 °C)   Resp 18   Ht 4' 9\" (1 448 m)   Wt 62 6 kg (138 lb)   SpO2 98%   BMI 29 86 kg/m²        Physical Exam     Physical Exam  Constitutional:       Appearance: She is well-developed  HENT:      Head: Normocephalic and atraumatic  Right Ear: External ear normal       Left Ear: External ear normal       Nose: Nose normal       Mouth/Throat:      Pharynx: No oropharyngeal exudate  Cardiovascular:      Rate and Rhythm: Normal rate and regular rhythm  Heart sounds: Normal heart sounds  Pulmonary:      Effort: Pulmonary effort is normal  No respiratory distress  Breath sounds: Normal breath sounds  No wheezing or rales  Chest:      Chest wall: No tenderness  Abdominal:      General: Bowel sounds are normal  There is no distension  Palpations: Abdomen is soft  There is no mass  Tenderness: There is no abdominal tenderness   There is no right CVA tenderness, left CVA tenderness, " guarding or rebound  Musculoskeletal:      Cervical back: Normal range of motion and neck supple  Lymphadenopathy:      Cervical: No cervical adenopathy

## 2023-05-24 NOTE — LETTER
May 24, 2023     Patient: Claire Chaney   YOB: 1960   Date of Visit: 5/24/2023       To Whom it May Concern:    Claire Chaney was seen in my clinic on 5/24/2023  She may return to work on 5/26/2023  If you have any questions or concerns, please don't hesitate to call           Sincerely,          [facility]        CC: No Recipients

## 2023-05-24 NOTE — TELEPHONE ENCOUNTER
Pt aware of results and is currently at [facility] urgent care

## 2023-05-24 NOTE — TELEPHONE ENCOUNTER
Urine culture showed UTI caused by E  Coli bacteria  The antibiotic she was prescribed should have taken care of the infection  If she is still having symptoms I recommend another urine culture

## 2023-05-25 LAB — BACTERIA UR CULT: NORMAL

## 2023-06-03 ENCOUNTER — APPOINTMENT (OUTPATIENT)
Dept: LAB | Facility: IMAGING CENTER | Age: 63
End: 2023-06-03
Payer: COMMERCIAL

## 2023-06-03 DIAGNOSIS — R63.4 WEIGHT LOSS: ICD-10-CM

## 2023-06-03 DIAGNOSIS — I10 HYPERTENSION, UNSPECIFIED TYPE: ICD-10-CM

## 2023-06-03 DIAGNOSIS — R53.83 OTHER FATIGUE: ICD-10-CM

## 2023-06-03 LAB
ALBUMIN SERPL BCP-MCNC: 2.5 G/DL (ref 3.5–5)
ALP SERPL-CCNC: 147 U/L (ref 46–116)
ALT SERPL W P-5'-P-CCNC: 14 U/L (ref 12–78)
ANION GAP SERPL CALCULATED.3IONS-SCNC: 4 MMOL/L (ref 4–13)
AST SERPL W P-5'-P-CCNC: 21 U/L (ref 5–45)
BASOPHILS # BLD AUTO: 0.05 THOUSANDS/ÂΜL (ref 0–0.1)
BASOPHILS NFR BLD AUTO: 1 % (ref 0–1)
BILIRUB SERPL-MCNC: 0.37 MG/DL (ref 0.2–1)
BUN SERPL-MCNC: 18 MG/DL (ref 5–25)
CALCIUM ALBUM COR SERPL-MCNC: 10.1 MG/DL (ref 8.3–10.1)
CALCIUM SERPL-MCNC: 8.9 MG/DL (ref 8.3–10.1)
CHLORIDE SERPL-SCNC: 109 MMOL/L (ref 96–108)
CHOLEST SERPL-MCNC: 141 MG/DL
CO2 SERPL-SCNC: 25 MMOL/L (ref 21–32)
CREAT SERPL-MCNC: 0.76 MG/DL (ref 0.6–1.3)
EOSINOPHIL # BLD AUTO: 0.14 THOUSAND/ÂΜL (ref 0–0.61)
EOSINOPHIL NFR BLD AUTO: 3 % (ref 0–6)
ERYTHROCYTE [DISTWIDTH] IN BLOOD BY AUTOMATED COUNT: 18.7 % (ref 11.6–15.1)
FERRITIN SERPL-MCNC: 351 NG/ML (ref 11–307)
GFR SERPL CREATININE-BSD FRML MDRD: 83 ML/MIN/1.73SQ M
GLUCOSE P FAST SERPL-MCNC: 75 MG/DL (ref 65–99)
HCT VFR BLD AUTO: 36.3 % (ref 34.8–46.1)
HDLC SERPL-MCNC: 29 MG/DL
HGB BLD-MCNC: 11.5 G/DL (ref 11.5–15.4)
IMM GRANULOCYTES # BLD AUTO: 0.03 THOUSAND/UL (ref 0–0.2)
IMM GRANULOCYTES NFR BLD AUTO: 1 % (ref 0–2)
IRON SATN MFR SERPL: 14 % (ref 15–50)
IRON SERPL-MCNC: 31 UG/DL (ref 50–170)
LDLC SERPL CALC-MCNC: 85 MG/DL (ref 0–100)
LYMPHOCYTES # BLD AUTO: 1.13 THOUSANDS/ÂΜL (ref 0.6–4.47)
LYMPHOCYTES NFR BLD AUTO: 20 % (ref 14–44)
MCH RBC QN AUTO: 25.4 PG (ref 26.8–34.3)
MCHC RBC AUTO-ENTMCNC: 31.7 G/DL (ref 31.4–37.4)
MCV RBC AUTO: 80 FL (ref 82–98)
MONOCYTES # BLD AUTO: 0.53 THOUSAND/ÂΜL (ref 0.17–1.22)
MONOCYTES NFR BLD AUTO: 10 % (ref 4–12)
NEUTROPHILS # BLD AUTO: 3.67 THOUSANDS/ÂΜL (ref 1.85–7.62)
NEUTS SEG NFR BLD AUTO: 65 % (ref 43–75)
NRBC BLD AUTO-RTO: 0 /100 WBCS
PLATELET # BLD AUTO: 400 THOUSANDS/UL (ref 149–390)
PMV BLD AUTO: 10.9 FL (ref 8.9–12.7)
POTASSIUM SERPL-SCNC: 3.1 MMOL/L (ref 3.5–5.3)
PROT SERPL-MCNC: 7.2 G/DL (ref 6.4–8.4)
RBC # BLD AUTO: 4.52 MILLION/UL (ref 3.81–5.12)
SODIUM SERPL-SCNC: 138 MMOL/L (ref 135–147)
T4 FREE SERPL-MCNC: 0.89 NG/DL (ref 0.61–1.12)
TIBC SERPL-MCNC: 220 UG/DL (ref 250–450)
TRIGL SERPL-MCNC: 136 MG/DL
TSH SERPL DL<=0.05 MIU/L-ACNC: 5.63 UIU/ML (ref 0.45–4.5)
WBC # BLD AUTO: 5.55 THOUSAND/UL (ref 4.31–10.16)

## 2023-06-03 PROCEDURE — 84439 ASSAY OF FREE THYROXINE: CPT

## 2023-06-03 PROCEDURE — 85025 COMPLETE CBC W/AUTO DIFF WBC: CPT

## 2023-06-03 PROCEDURE — 36415 COLL VENOUS BLD VENIPUNCTURE: CPT

## 2023-06-03 PROCEDURE — 83540 ASSAY OF IRON: CPT

## 2023-06-03 PROCEDURE — 80061 LIPID PANEL: CPT

## 2023-06-03 PROCEDURE — 84443 ASSAY THYROID STIM HORMONE: CPT

## 2023-06-03 PROCEDURE — 83550 IRON BINDING TEST: CPT

## 2023-06-03 PROCEDURE — 80053 COMPREHEN METABOLIC PANEL: CPT

## 2023-06-03 PROCEDURE — 82728 ASSAY OF FERRITIN: CPT

## 2023-06-08 ENCOUNTER — TELEPHONE (OUTPATIENT)
Dept: FAMILY MEDICINE CLINIC | Facility: CLINIC | Age: 63
End: 2023-06-08

## 2023-06-08 DIAGNOSIS — R79.89 HIGH SERUM FERRITIN: ICD-10-CM

## 2023-06-08 DIAGNOSIS — E61.1 LOW IRON: Primary | ICD-10-CM

## 2023-06-08 NOTE — TELEPHONE ENCOUNTER
----- [facility] [address] sent at 6/8/2023 10:11 AM EDT -----  Labs show low iron but high ferritin  Recommend referral to Hematology  Her potassium is low - try and eat food with high potassium (bananas, potatoes)  TSH was elevated but T4 normal  Will continue to monitor  Will discuss all labs in more detail at her appt next week

## 2023-06-09 ENCOUNTER — TELEPHONE (OUTPATIENT)
Dept: HEMATOLOGY ONCOLOGY | Facility: CLINIC | Age: 63
End: 2023-06-09

## 2023-06-09 NOTE — TELEPHONE ENCOUNTER
I called Mukund Martell in response to a referral that was received for patient to establish care with Hematology  Outreach was made to schedule a consultation     The patient stated that she would have to give us a call back later  Another attempt will be made to contact patient

## 2023-06-12 ENCOUNTER — TELEPHONE (OUTPATIENT)
Dept: HEMATOLOGY ONCOLOGY | Facility: CLINIC | Age: 63
End: 2023-06-12

## 2023-06-12 NOTE — TELEPHONE ENCOUNTER
I called Adrian Brown in response to a referral that was received for patient to establish care with Hematology  Outreach was made to schedule a consultation     Patient declined scheduling at this time and would like to follow up in early July  Referral will be deferred until 07/01/23, at which point we will touch base

## 2023-06-16 ENCOUNTER — OFFICE VISIT (OUTPATIENT)
Dept: FAMILY MEDICINE CLINIC | Facility: CLINIC | Age: 63
End: 2023-06-16
Payer: COMMERCIAL

## 2023-06-16 VITALS
WEIGHT: 137 LBS | HEART RATE: 105 BPM | HEIGHT: 57 IN | DIASTOLIC BLOOD PRESSURE: 100 MMHG | TEMPERATURE: 98.2 F | SYSTOLIC BLOOD PRESSURE: 160 MMHG | OXYGEN SATURATION: 98 % | RESPIRATION RATE: 14 BRPM | BODY MASS INDEX: 29.56 KG/M2

## 2023-06-16 DIAGNOSIS — R10.32 BILATERAL GROIN PAIN: ICD-10-CM

## 2023-06-16 DIAGNOSIS — Z78.0 ASYMPTOMATIC MENOPAUSE: ICD-10-CM

## 2023-06-16 DIAGNOSIS — R79.89 HIGH SERUM FERRITIN: ICD-10-CM

## 2023-06-16 DIAGNOSIS — R10.2 PELVIC PAIN: ICD-10-CM

## 2023-06-16 DIAGNOSIS — Z12.11 COLON CANCER SCREENING: ICD-10-CM

## 2023-06-16 DIAGNOSIS — R10.31 BILATERAL GROIN PAIN: ICD-10-CM

## 2023-06-16 DIAGNOSIS — I10 HYPERTENSION, UNSPECIFIED TYPE: Primary | ICD-10-CM

## 2023-06-16 DIAGNOSIS — Z00.00 HEALTHCARE MAINTENANCE: ICD-10-CM

## 2023-06-16 PROCEDURE — 99214 OFFICE O/P EST MOD 30 MIN: CPT | Performed by: NURSE PRACTITIONER

## 2023-06-16 RX ORDER — LOSARTAN POTASSIUM 50 MG/1
50 TABLET ORAL DAILY
Qty: 90 TABLET | Refills: 0 | Status: SHIPPED | OUTPATIENT
Start: 2023-06-16

## 2023-06-16 NOTE — ASSESSMENT & PLAN NOTE
- Not well controlled  - Will restart her losartan at 50 mg daily   - Monitor BP at home and record readings  Bring to next visit  - Recommend follow up in 1 month

## 2023-06-16 NOTE — ASSESSMENT & PLAN NOTE
- No improvement after course of antibiotics for UTI  - Will obtain ultrasound  - Referred to GYN as well as she has not had exam in several years

## 2023-06-16 NOTE — PROGRESS NOTES
Name: Adithya Khalil      : 1960      MRN: [id number]  Encounter Provider: FRANCISCA Cruz  Encounter Date: 2023   Encounter department: [facility]  PRIMARY CARE    Assessment & Plan     1  Hypertension, unspecified type  Assessment & Plan:  - Not well controlled  - Will restart her losartan at 50 mg daily   - Monitor BP at home and record readings  Bring to next visit  - Recommend follow up in 1 month  Orders:  -     losartan (COZAAR) 50 mg tablet; Take 1 tablet (50 mg total) by mouth daily    2  Pelvic pain  Assessment & Plan:  - No improvement after course of antibiotics for UTI  - Will obtain ultrasound  - Referred to GYN as well as she has not had exam in several years  Orders:  -     US pelvis complete non OB; Future; Expected date: 2023    3  Bilateral groin pain  Assessment & Plan:  - Will obtain ultrasound and continue to follow up  Orders:  -     US groin/inguinal area; Future; Expected date: 2023    4  High serum ferritin  Assessment & Plan:  - Referred to Hematology  - Will continue to follow up  5  Healthcare maintenance  -     Ambulatory Referral to Gynecology; Future    6  Colon cancer screening    7  Asymptomatic menopause  -     DXA bone density spine hip and pelvis; Future; Expected date: 2023           Subjective     Patient presents today for follow up  She had her blood work done which showed elevated TSH but normal T4, low iron but high ferritin, and hypokalemia  She was referred to Hematology and has an upcoming appointment  Her BP was well controlled last visit off of medications  She has been monitoring it at home and reports readings of 710-918 systolic and  diastolic  Her BP is elevated in the office today  She was seen last with UTI symptoms  Improved after course of antibiotics  Still complains of pelvic pain and bilaterally groin pain  Does have small palpable lump in left groin   She denies any other concerns or complaints today  Review of Systems   Constitutional: Negative for fatigue and fever  HENT: Negative for trouble swallowing  Eyes: Negative for visual disturbance  Respiratory: Negative for cough and shortness of breath  Cardiovascular: Negative for chest pain and palpitations  Gastrointestinal: Negative for abdominal pain and blood in stool  Bilateral groin pain    Endocrine: Negative for cold intolerance and heat intolerance  Genitourinary: Positive for pelvic pain  Negative for difficulty urinating and dysuria  Musculoskeletal: Negative for gait problem  Skin: Negative for rash  Neurological: Negative for dizziness, syncope and headaches  Hematological: Negative for adenopathy  Psychiatric/Behavioral: Negative for behavioral problems  Past Medical History:   Diagnosis Date   • Hypertension      History reviewed  No pertinent surgical history  History reviewed  No pertinent family history    Social History     Socioeconomic History   • Marital status: /Civil Union     Spouse name: None   • Number of children: None   • Years of education: None   • Highest education level: None   Occupational History   • None   Tobacco Use   • Smoking status: Every Day     Packs/day: 1 00     Types: Cigarettes   • Smokeless tobacco: Never   Vaping Use   • Vaping Use: Never used   Substance and Sexual Activity   • Alcohol use: Never   • Drug use: Never   • Sexual activity: None   Other Topics Concern   • None   Social History Narrative   • None     Social Determinants of Health     Financial Resource Strain: Not on file   Food Insecurity: Not on file   Transportation Needs: Not on file   Physical Activity: Not on file   Stress: Not on file   Social Connections: Not on file   Intimate Partner Violence: Not on file   Housing Stability: Not on file     Current Outpatient Medications on File Prior to Visit   Medication Sig   • hydrochlorothiazide (HYDRODIURIL) 25 mg "tablet TAKE ONE TABLET BY MOUTH EVERY MORNING (Patient not taking: Reported on 5/24/2023)   • hydrocortisone (ANUSOL-HC) 2 5 % rectal cream Apply topically 2 (two) times a day (Patient not taking: Reported on 6/16/2023)   • hydrocortisone (ANUSOL-HC) 25 mg suppository Insert 1 suppository (25 mg total) into the rectum 2 (two) times a day (Patient not taking: Reported on 6/16/2023)   • [DISCONTINUED] losartan (COZAAR) 100 MG tablet TAKE ONE TABLET BY MOUTH EVERY MORNING (Patient not taking: Reported on 5/24/2023)     Allergies   Allergen Reactions   • Acetaminophen Hives   • Penicillins Hives     Immunization History   Administered Date(s) Administered   • COVID-19 MODERNA VACC 0 5 ML IM 03/27/2021, 12/17/2021       Objective     /100 (BP Location: Left arm, Patient Position: Sitting, Cuff Size: Adult)   Pulse 105   Temp 98 2 °F (36 8 °C) (Tympanic)   Resp 14   Ht 4' 9\" (1 448 m)   Wt 62 1 kg (137 lb)   SpO2 98%   BMI 29 65 kg/m²     Physical Exam  Vitals and nursing note reviewed  Constitutional:       Appearance: Normal appearance  HENT:      Head: Normocephalic and atraumatic  Right Ear: External ear normal       Left Ear: External ear normal    Eyes:      Conjunctiva/sclera: Conjunctivae normal    Cardiovascular:      Rate and Rhythm: Normal rate and regular rhythm  Heart sounds: Normal heart sounds  Pulmonary:      Effort: Pulmonary effort is normal       Breath sounds: Normal breath sounds  Musculoskeletal:         General: Normal range of motion  Cervical back: Normal range of motion  Skin:     General: Skin is warm and dry  Neurological:      Mental Status: She is alert and oriented to person, place, and time  Cranial Nerves: No cranial nerve deficit     Psychiatric:         Mood and Affect: Mood normal          Behavior: Behavior normal        FRANCISCA Ballard  "

## 2023-06-16 NOTE — PATIENT INSTRUCTIONS
Potassium Content of Foods List   WHAT YOU NEED TO KNOW:   Potassium is a mineral that is found in most foods  Potassium helps to balance fluids and minerals in your body  It also helps your body maintain a normal blood pressure  Potassium helps your muscles contract and your nerves function normally  DISCHARGE INSTRUCTIONS:   Why you may need to change the amount of potassium you eat: You may need more potassium  if you have hypokalemia (low potassium levels) or high blood pressure  You may also need more potassium if you are taking diuretics  Diuretics and certain medicines cause your body to lose potassium  You may need less potassium  in your diet if you have hyperkalemia (high potassium levels) or kidney disease  Potassium content of fruit:  The amount of potassium in milligrams (mg) contained in each fruit or serving of fruit is listed beside the item    High-potassium foods (more than 200 mg per serving):      1 medium banana (425)    ½ of a papaya (390)    ½ cup of prune juice (370)    ¼ cup of raisins (270)    1 medium bartolome (325) or kiwi (240)    1 small orange (240) or ½ cup of orange juice (235)    ½ cup of cubed cantaloupe (215) or diced honeydew melon (200)    1 medium pear (200)    Medium-potassium foods (50 to 200 mg per serving):      1 medium peach (185)    1 small apple or ½ cup of apple juice (150)    ½ cup of peaches canned in juice (120)    ½ cup of canned pineapple (100)    ½ cup of fresh, sliced strawberries (627)    ½ cup of watermelon (85)    Low-potassium foods (less than 50 mg per serving):      ½ cup of cranberries (45) or cranberry juice cocktail (20)    ½ cup of nectar of papaya, bartolome, or pear (35)    Potassium content of vegetables:   High-potassium foods (more than 200 mg per serving):      1 medium baked potato, with skin (925)    1 baked medium sweet potato, with skin (450)    ½ cup of tomato or vegetable juice (275), or 1 medium raw tomato (290)    ½ cup of mushrooms (280)    ½ cup of fresh brussels sprouts (250)    ½ cup of cooked zucchini (220) or winter squash (250)    ¼ of a medium avocado (245)    ½ cup of broccoli (230)    Medium-potassium foods (50 to 200 mg per serving):      ½ cup of corn (195)    ½ cup of fresh or cooked carrots (180)    ½ cup of fresh cauliflower (150)    ½ cup of asparagus (155)    ½ cup of canned peas (90)     1 cup of lettuce, all types (100)    ½ cup of fresh green beans (90)    ½ cup of frozen green beans (85)    ½ cup of cucumber (80)    Potassium content of protein foods:   High-potassium foods (more than 200 mg per serving):      ½ cup of cooked zhang beans (400) or lentils (365)    1 cup of soy milk (300)    3 ounces of baked or broiled salmon (319)    3 ounces of roasted turkey, dark meat (250)    ¼ cup of sunflower seeds (241)    3 ounces of cooked lean beef (224)    2 tablespoons of smooth peanut butter (210)    Medium-potassium foods (50 to 200 mg per serving):      1 ounce of salted peanuts, almonds, or cashews (200)    1 large egg (60 mg)    Potassium content of dairy foods:   High-potassium foods (more than 200 mg per serving):      6 ounces of yogurt (260 to 435)    1 cup of nonfat, low-fat, or whole milk (350 to 380)    Medium-potassium foods (50 to 200 mg per serving):      ½ cup of ricotta cheese (154)    ½ cup of vanilla ice cream (131)    ½ cup of low-fat (2%) cottage cheese (110)    Low-potassium foods (less than 50 mg per serving):      1 ounce of cheese (20 to 30)      Potassium content of grains:   1 slice of white bread (30)    ½ cup of white or brown rice (50)    ½ cup of spaghetti or macaroni (30)    1 flour or corn tortilla (50)    1 four-inch waffle (50)    Potassium content of other foods:   1 tablespoon of molasses (295)    1½ ounces of chocolate (165)    Some salt substitutes may contain a high amount of potassium  Check the food label to find the amount of potassium it contains      © Copyright Merative 2022 Information is for End User's use only and may not be sold, redistributed or otherwise used for commercial purposes  The above information is an  only  It is not intended as medical advice for individual conditions or treatments  Talk to your doctor, nurse or pharmacist before following any medical regimen to see if it is safe and effective for you

## 2023-06-21 ENCOUNTER — VBI (OUTPATIENT)
Dept: ADMINISTRATIVE | Facility: OTHER | Age: 63
End: 2023-06-21

## 2023-07-03 ENCOUNTER — TELEPHONE (OUTPATIENT)
Dept: HEMATOLOGY ONCOLOGY | Facility: CLINIC | Age: 63
End: 2023-07-03

## 2023-07-03 NOTE — TELEPHONE ENCOUNTER
I called Alondra Terry in response to a referral that was received for patient to establish care with Hematology. Outreach was made to schedule a consultation. .    Patient states that she is currently in [facility] and will call at a later time to schedule a appointment. I advise the patinet that the referral will be closed and when she is ready to schedule to give us a call back. The referral has been closed.

## 2023-07-07 ENCOUNTER — TELEPHONE (OUTPATIENT)
Dept: FAMILY MEDICINE CLINIC | Facility: CLINIC | Age: 63
End: 2023-07-07

## 2023-07-07 DIAGNOSIS — R30.9 PAINFUL URINATION: Primary | ICD-10-CM

## 2023-07-07 RX ORDER — NITROFURANTOIN 25; 75 MG/1; MG/1
100 CAPSULE ORAL 2 TIMES DAILY
Qty: 10 CAPSULE | Refills: 0 | Status: SHIPPED | OUTPATIENT
Start: 2023-07-07 | End: 2023-07-12

## 2023-07-07 NOTE — TELEPHONE ENCOUNTER
Patient called and said she was just here for a UTI and now it is back again and is hoping we can call in a prescription for her.

## 2023-07-07 NOTE — TELEPHONE ENCOUNTER
I did talk to Dr Tsering Bolanos and he ordered macrobid 100 mg bid for 5 days and she is to give a urine prior to starting the antibiotics which I did place order.  Pt is aware and agreed

## 2023-07-26 ENCOUNTER — HOSPITAL ENCOUNTER (OUTPATIENT)
Dept: RADIOLOGY | Facility: MEDICAL CENTER | Age: 63
Discharge: HOME | End: 2023-07-26
Payer: COMMERCIAL

## 2023-07-26 DIAGNOSIS — R10.31 BILATERAL GROIN PAIN: ICD-10-CM

## 2023-07-26 DIAGNOSIS — R10.32 BILATERAL GROIN PAIN: ICD-10-CM

## 2023-07-26 DIAGNOSIS — R10.2 PELVIC PAIN: ICD-10-CM

## 2023-07-26 PROCEDURE — 76705 ECHO EXAM OF ABDOMEN: CPT

## 2023-07-26 PROCEDURE — 76856 US EXAM PELVIC COMPLETE: CPT

## 2023-07-26 PROCEDURE — 76830 TRANSVAGINAL US NON-OB: CPT

## 2023-08-01 ENCOUNTER — TELEPHONE (OUTPATIENT)
Dept: FAMILY MEDICINE CLINIC | Facility: CLINIC | Age: 63
End: 2023-08-01

## 2023-08-01 NOTE — TELEPHONE ENCOUNTER
[facility] on [facility].

## 2023-08-02 ENCOUNTER — TELEPHONE (OUTPATIENT)
Dept: FAMILY MEDICINE CLINIC | Facility: CLINIC | Age: 63
End: 2023-08-02

## 2023-08-02 NOTE — TELEPHONE ENCOUNTER
----- Message from [address] [facility] [facility] [address] sent at 8/2/2023  6:32 AM EDT -----  US of groin was normal. US of pelvis showed left ovarian cyst and uterine fibroids. May need possible MRI. Recommend to follow up with Gynecologist. She was already referred.

## 2023-08-11 ENCOUNTER — OFFICE VISIT (OUTPATIENT)
Dept: FAMILY MEDICINE CLINIC | Facility: CLINIC | Age: 63
End: 2023-08-11
Payer: COMMERCIAL

## 2023-08-11 VITALS
BODY MASS INDEX: 28.26 KG/M2 | OXYGEN SATURATION: 98 % | TEMPERATURE: 97.9 F | SYSTOLIC BLOOD PRESSURE: 126 MMHG | DIASTOLIC BLOOD PRESSURE: 82 MMHG | HEART RATE: 97 BPM | RESPIRATION RATE: 16 BRPM | HEIGHT: 57 IN | WEIGHT: 131 LBS

## 2023-08-11 DIAGNOSIS — K43.9 ABDOMINAL WALL HERNIA: ICD-10-CM

## 2023-08-11 DIAGNOSIS — R10.2 PELVIC PAIN: ICD-10-CM

## 2023-08-11 DIAGNOSIS — I10 HYPERTENSION, UNSPECIFIED TYPE: Primary | ICD-10-CM

## 2023-08-11 PROCEDURE — 99214 OFFICE O/P EST MOD 30 MIN: CPT | Performed by: NURSE PRACTITIONER

## 2023-08-11 NOTE — PROGRESS NOTES
Name: Mark Noyola      : 1960      MRN: [id number]  Encounter Provider: FRANCISCA Valentine  Encounter Date: 2023   Encounter department: [address] [place] [address] 15  PRIMARY CARE    Assessment & Plan     1. Hypertension, unspecified type  Assessment & Plan:  - Improving.   - Continue losartan 50 mg daily. - Will continue to monitor. Orders:  -     CBC and differential; Future  -     Comprehensive metabolic panel; Future  -     Lipid Panel with Direct LDL reflex; Future  -     TSH, 3rd generation with Free T4 reflex; Future    2. Abdominal wall hernia  Assessment & Plan:  - Referred to General Surgery. Orders:  -     Ambulatory Referral to General Surgery; Future    3. Pelvic pain  Assessment & Plan:  - Pelvic US significant for ovarian cyst and uterine fibroids. Recommended referral to GYN. Subjective     Patient presents to office today for follow up regarding hypertension. She was restarted on losartan 50 mg daily. Her blood pressure is well controlled. She recently underwent pelvic US which showed left ovarian cyst and uterine fibroids. Recommended follow up with GYN for possible MRI. She has complaints today of abdominal wall hernia which is becoming more bothersome especially when she eats. She denies any other concerns or complaints today. Review of Systems   Constitutional: Negative for fatigue and fever. HENT: Negative for trouble swallowing. Eyes: Negative for visual disturbance. Respiratory: Negative for cough and shortness of breath. Cardiovascular: Negative for chest pain and palpitations. Gastrointestinal: Negative for abdominal pain and blood in stool. Hernia     Endocrine: Negative for cold intolerance and heat intolerance. Genitourinary: Negative for difficulty urinating and dysuria. Musculoskeletal: Negative for gait problem. Skin: Negative for rash. Neurological: Negative for dizziness, syncope and headaches. Hematological: Negative for adenopathy. Psychiatric/Behavioral: Negative for behavioral problems. Past Medical History:   Diagnosis Date   • Hypertension      History reviewed. No pertinent surgical history. History reviewed. No pertinent family history.   Social History     Socioeconomic History   • Marital status: /Civil Union     Spouse name: None   • Number of children: None   • Years of education: None   • Highest education level: None   Occupational History   • None   Tobacco Use   • Smoking status: Every Day     Packs/day: 1.00     Types: Cigarettes   • Smokeless tobacco: Never   Vaping Use   • Vaping Use: Never used   Substance and Sexual Activity   • Alcohol use: Never   • Drug use: Never   • Sexual activity: None   Other Topics Concern   • None   Social History Narrative   • None     Social Determinants of Health     Financial Resource Strain: Not on file   Food Insecurity: Not on file   Transportation Needs: Not on file   Physical Activity: Not on file   Stress: Not on file   Social Connections: Not on file   Intimate Partner Violence: Not on file   Housing Stability: Not on file     Current Outpatient Medications on File Prior to Visit   Medication Sig   • losartan (COZAAR) 50 mg tablet Take 1 tablet (50 mg total) by mouth daily   • hydrochlorothiazide (HYDRODIURIL) 25 mg tablet TAKE ONE TABLET BY MOUTH EVERY MORNING (Patient not taking: Reported on 5/24/2023)   • hydrocortisone (ANUSOL-HC) 2.5 % rectal cream Apply topically 2 (two) times a day (Patient not taking: Reported on 6/16/2023)   • hydrocortisone (ANUSOL-HC) 25 mg suppository Insert 1 suppository (25 mg total) into the rectum 2 (two) times a day (Patient not taking: Reported on 6/16/2023)     Allergies   Allergen Reactions   • Acetaminophen Hives   • Penicillins Hives     Immunization History   Administered Date(s) Administered   • COVID-19 MODERNA VACC 0.5 ML IM 03/27/2021, 12/17/2021       Objective     /82 (BP Detail Level: Detailed Location: Left arm, Patient Position: Sitting, Cuff Size: Adult)   Pulse 97   Temp 97.9 °F (36.6 °C) (Tympanic)   Resp 16   Ht 4' 9" (1.448 m)   Wt 59.4 kg (131 lb)   SpO2 98%   BMI 28.35 kg/m²     Physical Exam  Vitals and nursing note reviewed. Constitutional:       General: She is not in acute distress. Appearance: Normal appearance. She is not ill-appearing. HENT:      Head: Normocephalic and atraumatic. Right Ear: External ear normal.      Left Ear: External ear normal.   Eyes:      Conjunctiva/sclera: Conjunctivae normal.   Cardiovascular:      Rate and Rhythm: Normal rate and regular rhythm. Heart sounds: Normal heart sounds. Pulmonary:      Effort: Pulmonary effort is normal.      Breath sounds: Normal breath sounds. Abdominal:      General: Bowel sounds are normal.      Palpations: Abdomen is soft. Hernia: A hernia (reducible abdominal wall hernia) is present. Musculoskeletal:         General: Normal range of motion. Cervical back: Normal range of motion. Skin:     General: Skin is warm and dry. Neurological:      Mental Status: She is alert and oriented to person, place, and time. Cranial Nerves: No cranial nerve deficit.    Psychiatric:         Mood and Affect: Mood normal.         Behavior: Behavior normal.       FRANCISCA Hayes Hide Additional Notes?: No

## 2023-08-12 ENCOUNTER — OFFICE VISIT (OUTPATIENT)
Dept: URGENT CARE | Facility: MEDICAL CENTER | Age: 63
End: 2023-08-12
Payer: COMMERCIAL

## 2023-08-12 ENCOUNTER — HOSPITAL ENCOUNTER (INPATIENT)
Facility: HOSPITAL | Age: 63
LOS: 6 days | Discharge: HOME HEALTH | DRG: 853 | End: 2023-08-18
Attending: EMERGENCY MEDICINE | Admitting: SURGERY
Payer: COMMERCIAL

## 2023-08-12 ENCOUNTER — APPOINTMENT (EMERGENCY)
Dept: RADIOLOGY | Facility: HOSPITAL | Age: 63
DRG: 853 | End: 2023-08-12
Payer: COMMERCIAL

## 2023-08-12 ENCOUNTER — ANESTHESIA (INPATIENT)
Dept: PERIOP | Facility: HOSPITAL | Age: 63
DRG: 853 | End: 2023-08-12
Payer: COMMERCIAL

## 2023-08-12 ENCOUNTER — ANESTHESIA EVENT (INPATIENT)
Dept: PERIOP | Facility: HOSPITAL | Age: 63
DRG: 853 | End: 2023-08-12
Payer: COMMERCIAL

## 2023-08-12 ENCOUNTER — APPOINTMENT (EMERGENCY)
Dept: CT IMAGING | Facility: HOSPITAL | Age: 63
DRG: 853 | End: 2023-08-12
Payer: COMMERCIAL

## 2023-08-12 VITALS
TEMPERATURE: 97.4 F | RESPIRATION RATE: 18 BRPM | BODY MASS INDEX: 28.05 KG/M2 | OXYGEN SATURATION: 100 % | SYSTOLIC BLOOD PRESSURE: 97 MMHG | WEIGHT: 130 LBS | DIASTOLIC BLOOD PRESSURE: 67 MMHG | HEART RATE: 108 BPM | HEIGHT: 57 IN

## 2023-08-12 DIAGNOSIS — K57.20 DIVERTICULITIS OF LARGE INTESTINE WITH PERFORATION AND ABSCESS: Primary | ICD-10-CM

## 2023-08-12 DIAGNOSIS — R65.20 SEVERE SEPSIS (HCC): ICD-10-CM

## 2023-08-12 DIAGNOSIS — R10.84 GENERALIZED ABDOMINAL PAIN: Primary | ICD-10-CM

## 2023-08-12 DIAGNOSIS — K80.20 CHOLELITHIASIS: ICD-10-CM

## 2023-08-12 DIAGNOSIS — K76.89 HEPATIC CYST: ICD-10-CM

## 2023-08-12 DIAGNOSIS — K66.8 PNEUMOPERITONEUM: ICD-10-CM

## 2023-08-12 DIAGNOSIS — A41.9 SEVERE SEPSIS (HCC): ICD-10-CM

## 2023-08-12 DIAGNOSIS — N32.1 COLOVESICAL FISTULA: ICD-10-CM

## 2023-08-12 PROBLEM — K57.80 PERFORATED DIVERTICULUM: Status: ACTIVE | Noted: 2023-08-12

## 2023-08-12 LAB
2HR DELTA HS TROPONIN: 0 NG/L
ALBUMIN SERPL BCP-MCNC: 3.5 G/DL (ref 3.5–5)
ALP SERPL-CCNC: 186 U/L (ref 34–104)
ALT SERPL W P-5'-P-CCNC: 7 U/L (ref 7–52)
ANION GAP SERPL CALCULATED.3IONS-SCNC: 13 MMOL/L
APTT PPP: 31 SECONDS (ref 23–37)
AST SERPL W P-5'-P-CCNC: 10 U/L (ref 13–39)
BACTERIA UR QL AUTO: ABNORMAL /HPF
BASOPHILS # BLD MANUAL: 0 THOUSAND/UL (ref 0–0.1)
BASOPHILS NFR MAR MANUAL: 0 % (ref 0–1)
BILIRUB SERPL-MCNC: 2.3 MG/DL (ref 0.2–1)
BILIRUB UR QL STRIP: NEGATIVE
BUN SERPL-MCNC: 27 MG/DL (ref 5–25)
CALCIUM SERPL-MCNC: 9.1 MG/DL (ref 8.4–10.2)
CARDIAC TROPONIN I PNL SERPL HS: 5 NG/L
CARDIAC TROPONIN I PNL SERPL HS: 5 NG/L
CHLORIDE SERPL-SCNC: 102 MMOL/L (ref 96–108)
CLARITY UR: ABNORMAL
CO2 SERPL-SCNC: 23 MMOL/L (ref 21–32)
COLOR UR: YELLOW
CREAT SERPL-MCNC: 1.06 MG/DL (ref 0.6–1.3)
EOSINOPHIL # BLD MANUAL: 0 THOUSAND/UL (ref 0–0.4)
EOSINOPHIL NFR BLD MANUAL: 0 % (ref 0–6)
ERYTHROCYTE [DISTWIDTH] IN BLOOD BY AUTOMATED COUNT: 17.9 % (ref 11.6–15.1)
GFR SERPL CREATININE-BSD FRML MDRD: 55 ML/MIN/1.73SQ M
GLUCOSE SERPL-MCNC: 113 MG/DL (ref 65–140)
GLUCOSE UR STRIP-MCNC: NEGATIVE MG/DL
HCT VFR BLD AUTO: 40.7 % (ref 34.8–46.1)
HGB BLD-MCNC: 13.2 G/DL (ref 11.5–15.4)
HGB UR QL STRIP.AUTO: ABNORMAL
HYALINE CASTS #/AREA URNS LPF: ABNORMAL /LPF
INR PPP: 1.23 (ref 0.84–1.19)
KETONES UR STRIP-MCNC: NEGATIVE MG/DL
LACTATE SERPL-SCNC: 2.4 MMOL/L (ref 0.5–2)
LACTATE SERPL-SCNC: 3 MMOL/L (ref 0.5–2)
LEUKOCYTE ESTERASE UR QL STRIP: ABNORMAL
LIPASE SERPL-CCNC: <6 U/L (ref 11–82)
LYMPHOCYTES # BLD AUTO: 1.42 THOUSAND/UL (ref 0.6–4.47)
LYMPHOCYTES # BLD AUTO: 6 % (ref 14–44)
MAGNESIUM SERPL-MCNC: 1.7 MG/DL (ref 1.9–2.7)
MCH RBC QN AUTO: 25.3 PG (ref 26.8–34.3)
MCHC RBC AUTO-ENTMCNC: 32.4 G/DL (ref 31.4–37.4)
MCV RBC AUTO: 78 FL (ref 82–98)
METAMYELOCYTES NFR BLD MANUAL: 1 % (ref 0–1)
MONOCYTES # BLD AUTO: 0.18 THOUSAND/UL (ref 0–1.22)
MONOCYTES NFR BLD: 1 % (ref 4–12)
MUCOUS THREADS UR QL AUTO: ABNORMAL
NEUTROPHILS # BLD MANUAL: 15.95 THOUSAND/UL (ref 1.85–7.62)
NEUTS BAND NFR BLD MANUAL: 20 % (ref 0–8)
NEUTS SEG NFR BLD AUTO: 70 % (ref 43–75)
NITRITE UR QL STRIP: NEGATIVE
NON-SQ EPI CELLS URNS QL MICRO: ABNORMAL /HPF
PH UR STRIP.AUTO: 6 [PH]
PLATELET # BLD AUTO: 347 THOUSANDS/UL (ref 149–390)
PLATELET # BLD AUTO: 473 THOUSANDS/UL (ref 149–390)
PLATELET BLD QL SMEAR: ADEQUATE
PLATELET CLUMP BLD QL SMEAR: PRESENT
PMV BLD AUTO: 9.6 FL (ref 8.9–12.7)
PMV BLD AUTO: 9.9 FL (ref 8.9–12.7)
POTASSIUM SERPL-SCNC: 3.3 MMOL/L (ref 3.5–5.3)
PROCALCITONIN SERPL-MCNC: 14.61 NG/ML
PROT SERPL-MCNC: 7.2 G/DL (ref 6.4–8.4)
PROT UR STRIP-MCNC: ABNORMAL MG/DL
PROTHROMBIN TIME: 16.2 SECONDS (ref 11.6–14.5)
RBC # BLD AUTO: 5.22 MILLION/UL (ref 3.81–5.12)
RBC #/AREA URNS AUTO: ABNORMAL /HPF
RBC MORPH BLD: PRESENT
SODIUM SERPL-SCNC: 138 MMOL/L (ref 135–147)
SP GR UR STRIP.AUTO: 1.02 (ref 1–1.03)
STOMATOCYTES BLD QL SMEAR: PRESENT
UROBILINOGEN UR STRIP-ACNC: 2 MG/DL
VARIANT LYMPHS # BLD AUTO: 2 %
WBC # BLD AUTO: 17.72 THOUSAND/UL (ref 4.31–10.16)
WBC #/AREA URNS AUTO: ABNORMAL /HPF
WBC CASTS URNS QL MICRO: ABNORMAL /LPF
WBC CLUMPS # UR AUTO: PRESENT /UL

## 2023-08-12 PROCEDURE — 99213 OFFICE O/P EST LOW 20 MIN: CPT

## 2023-08-12 PROCEDURE — 36415 COLL VENOUS BLD VENIPUNCTURE: CPT

## 2023-08-12 PROCEDURE — 83605 ASSAY OF LACTIC ACID: CPT | Performed by: PHYSICIAN ASSISTANT

## 2023-08-12 PROCEDURE — 96361 HYDRATE IV INFUSION ADD-ON: CPT

## 2023-08-12 PROCEDURE — 87086 URINE CULTURE/COLONY COUNT: CPT | Performed by: PHYSICIAN ASSISTANT

## 2023-08-12 PROCEDURE — 96367 TX/PROPH/DG ADDL SEQ IV INF: CPT

## 2023-08-12 PROCEDURE — 0FT40ZZ RESECTION OF GALLBLADDER, OPEN APPROACH: ICD-10-PCS | Performed by: SURGERY

## 2023-08-12 PROCEDURE — 83735 ASSAY OF MAGNESIUM: CPT | Performed by: PHYSICIAN ASSISTANT

## 2023-08-12 PROCEDURE — 0DTN0ZZ RESECTION OF SIGMOID COLON, OPEN APPROACH: ICD-10-PCS | Performed by: SURGERY

## 2023-08-12 PROCEDURE — 96375 TX/PRO/DX INJ NEW DRUG ADDON: CPT

## 2023-08-12 PROCEDURE — 0D1M0Z4 BYPASS DESCENDING COLON TO CUTANEOUS, OPEN APPROACH: ICD-10-PCS | Performed by: SURGERY

## 2023-08-12 PROCEDURE — 85007 BL SMEAR W/DIFF WBC COUNT: CPT | Performed by: EMERGENCY MEDICINE

## 2023-08-12 PROCEDURE — 99291 CRITICAL CARE FIRST HOUR: CPT | Performed by: PHYSICIAN ASSISTANT

## 2023-08-12 PROCEDURE — 84484 ASSAY OF TROPONIN QUANT: CPT | Performed by: PHYSICIAN ASSISTANT

## 2023-08-12 PROCEDURE — 84145 PROCALCITONIN (PCT): CPT | Performed by: PHYSICIAN ASSISTANT

## 2023-08-12 PROCEDURE — NC001 PR NO CHARGE: Performed by: PHYSICIAN ASSISTANT

## 2023-08-12 PROCEDURE — 93005 ELECTROCARDIOGRAM TRACING: CPT

## 2023-08-12 PROCEDURE — 99223 1ST HOSP IP/OBS HIGH 75: CPT | Performed by: SURGERY

## 2023-08-12 PROCEDURE — 85730 THROMBOPLASTIN TIME PARTIAL: CPT | Performed by: PHYSICIAN ASSISTANT

## 2023-08-12 PROCEDURE — 74177 CT ABD & PELVIS W/CONTRAST: CPT

## 2023-08-12 PROCEDURE — 96365 THER/PROPH/DIAG IV INF INIT: CPT

## 2023-08-12 PROCEDURE — 0DTJ0ZZ RESECTION OF APPENDIX, OPEN APPROACH: ICD-10-PCS | Performed by: SURGERY

## 2023-08-12 PROCEDURE — 83690 ASSAY OF LIPASE: CPT | Performed by: EMERGENCY MEDICINE

## 2023-08-12 PROCEDURE — 87040 BLOOD CULTURE FOR BACTERIA: CPT | Performed by: PHYSICIAN ASSISTANT

## 2023-08-12 PROCEDURE — 85049 AUTOMATED PLATELET COUNT: CPT

## 2023-08-12 PROCEDURE — 71045 X-RAY EXAM CHEST 1 VIEW: CPT

## 2023-08-12 PROCEDURE — 85610 PROTHROMBIN TIME: CPT | Performed by: PHYSICIAN ASSISTANT

## 2023-08-12 PROCEDURE — 81001 URINALYSIS AUTO W/SCOPE: CPT | Performed by: PHYSICIAN ASSISTANT

## 2023-08-12 PROCEDURE — 80053 COMPREHEN METABOLIC PANEL: CPT | Performed by: EMERGENCY MEDICINE

## 2023-08-12 PROCEDURE — 99285 EMERGENCY DEPT VISIT HI MDM: CPT

## 2023-08-12 PROCEDURE — 85027 COMPLETE CBC AUTOMATED: CPT | Performed by: EMERGENCY MEDICINE

## 2023-08-12 RX ORDER — ONDANSETRON 2 MG/ML
4 INJECTION INTRAMUSCULAR; INTRAVENOUS EVERY 6 HOURS PRN
Status: DISCONTINUED | OUTPATIENT
Start: 2023-08-12 | End: 2023-08-18 | Stop reason: HOSPADM

## 2023-08-12 RX ORDER — HEPARIN SODIUM 5000 [USP'U]/ML
5000 INJECTION, SOLUTION INTRAVENOUS; SUBCUTANEOUS EVERY 8 HOURS SCHEDULED
Status: DISCONTINUED | OUTPATIENT
Start: 2023-08-12 | End: 2023-08-13

## 2023-08-12 RX ORDER — METRONIDAZOLE 500 MG/100ML
500 INJECTION, SOLUTION INTRAVENOUS EVERY 8 HOURS
Status: DISCONTINUED | OUTPATIENT
Start: 2023-08-12 | End: 2023-08-13

## 2023-08-12 RX ORDER — NICOTINE 21 MG/24HR
1 PATCH, TRANSDERMAL 24 HOURS TRANSDERMAL DAILY
Status: DISCONTINUED | OUTPATIENT
Start: 2023-08-13 | End: 2023-08-18 | Stop reason: HOSPADM

## 2023-08-12 RX ORDER — HYDROMORPHONE HCL/PF 1 MG/ML
0.5 SYRINGE (ML) INJECTION ONCE
Status: COMPLETED | OUTPATIENT
Start: 2023-08-12 | End: 2023-08-12

## 2023-08-12 RX ORDER — ALBUMIN, HUMAN INJ 5% 5 %
25 SOLUTION INTRAVENOUS ONCE
Status: COMPLETED | OUTPATIENT
Start: 2023-08-12 | End: 2023-08-13

## 2023-08-12 RX ORDER — SODIUM CHLORIDE, SODIUM GLUCONATE, SODIUM ACETATE, POTASSIUM CHLORIDE, MAGNESIUM CHLORIDE, SODIUM PHOSPHATE, DIBASIC, AND POTASSIUM PHOSPHATE .53; .5; .37; .037; .03; .012; .00082 G/100ML; G/100ML; G/100ML; G/100ML; G/100ML; G/100ML; G/100ML
100 INJECTION, SOLUTION INTRAVENOUS CONTINUOUS
Status: DISCONTINUED | OUTPATIENT
Start: 2023-08-12 | End: 2023-08-14

## 2023-08-12 RX ADMIN — HYDROMORPHONE HYDROCHLORIDE 0.5 MG: 1 INJECTION, SOLUTION INTRAMUSCULAR; INTRAVENOUS; SUBCUTANEOUS at 20:25

## 2023-08-12 RX ADMIN — PIPERACILLIN AND TAZOBACTAM 3.38 G: 36; 4.5 INJECTION, POWDER, FOR SOLUTION INTRAVENOUS at 22:07

## 2023-08-12 RX ADMIN — METRONIDAZOLE 500 MG: 500 INJECTION, SOLUTION INTRAVENOUS at 21:26

## 2023-08-12 RX ADMIN — SODIUM CHLORIDE 1000 ML: 0.9 INJECTION, SOLUTION INTRAVENOUS at 20:03

## 2023-08-12 RX ADMIN — IOHEXOL 100 ML: 350 INJECTION, SOLUTION INTRAVENOUS at 20:51

## 2023-08-12 RX ADMIN — CEFEPIME 2000 MG: 2 INJECTION, POWDER, FOR SOLUTION INTRAVENOUS at 21:03

## 2023-08-12 RX ADMIN — SODIUM CHLORIDE, SODIUM GLUCONATE, SODIUM ACETATE, POTASSIUM CHLORIDE AND MAGNESIUM CHLORIDE 125 ML/HR: 526; 502; 368; 37; 30 INJECTION, SOLUTION INTRAVENOUS at 21:56

## 2023-08-12 RX ADMIN — ALBUMIN (HUMAN) 25 G: 12.5 INJECTION, SOLUTION INTRAVENOUS at 23:23

## 2023-08-12 RX ADMIN — SODIUM CHLORIDE 1000 ML: 0.9 INJECTION, SOLUTION INTRAVENOUS at 20:04

## 2023-08-12 NOTE — PROGRESS NOTES
[facility]        NAME: Amanuel Gonzáles is a 70 y.o. female  : 1960    MRN: [id number]  DATE: 2023  TIME: 3:50 PM    Assessment and Plan   Generalized abdominal pain [R10.84]  1. Generalized abdominal pain  Transfer to other facility        Given severity of abdominal pain with recent diagnosis of abdominal wall hernia, will send to the ER for further evaluation. Patient is declining emergency transport, states that her significant other will transport her to [address] [place] [address]. Transfer order placed in Epic. Patient Instructions       Please go to [address] [place] [address] for further work-up and evaluation of current abdominal pain. Chief Complaint     Chief Complaint   Patient presents with   • Abdominal Pain     Pt. C/O abdominal pain that began last night. She has a hernia and was given a referral to surgery from her PCP yesterday, but the pain became sever over night. History of Present Illness       Patient presenting for evaluation abdominal pain. Patient states that she was recently diagnosed with an abdominal wall hernia and referred to a general surgeon. She states that she has not yet seen general surgeon, but overnight she developed severe abdominal pain. She states that she has been taking Advil and applying heat to the area with minimal relief of her symptoms. She denies any nausea, vomiting or diarrhea. She states that her last bowel movement was today. She denies any recent fevers or chills. Abdominal Pain  Pertinent negatives include no diarrhea, fever, nausea or vomiting. Review of Systems   Review of Systems   Constitutional: Negative for chills and fever. Respiratory: Negative for shortness of breath. Cardiovascular: Negative for chest pain. Gastrointestinal: Positive for abdominal pain. Negative for diarrhea, nausea and vomiting. All other systems reviewed and are negative.         Current Medications Current Outpatient Medications:   •  hydrochlorothiazide (HYDRODIURIL) 25 mg tablet, TAKE ONE TABLET BY MOUTH EVERY MORNING (Patient not taking: Reported on 5/24/2023), Disp: 90 tablet, Rfl: 1  •  hydrocortisone (ANUSOL-HC) 2.5 % rectal cream, Apply topically 2 (two) times a day (Patient not taking: Reported on 6/16/2023), Disp: 28 g, Rfl: 1  •  hydrocortisone (ANUSOL-HC) 25 mg suppository, Insert 1 suppository (25 mg total) into the rectum 2 (two) times a day (Patient not taking: Reported on 6/16/2023), Disp: 12 suppository, Rfl: 0  •  losartan (COZAAR) 50 mg tablet, Take 1 tablet (50 mg total) by mouth daily (Patient not taking: Reported on 8/12/2023), Disp: 90 tablet, Rfl: 0    Current Allergies     Allergies as of 08/12/2023 - Reviewed 08/12/2023   Allergen Reaction Noted   • Acetaminophen Hives 03/16/2018   • Penicillins Hives 03/16/2018            The following portions of the patient's history were reviewed and updated as appropriate: allergies, current medications, past family history, past medical history, past social history, past surgical history and problem list.     Past Medical History:   Diagnosis Date   • Hypertension        History reviewed. No pertinent surgical history. History reviewed. No pertinent family history. Medications have been verified. Objective   BP 97/67   Pulse (!) 108   Temp (!) 97.4 °F (36.3 °C)   Resp 18   Ht 4' 9" (1.448 m)   Wt 59 kg (130 lb)   SpO2 100%   BMI 28.13 kg/m²        Physical Exam     Physical Exam  Vitals and nursing note reviewed. Constitutional:       General: She is not in acute distress. Appearance: Normal appearance. She is well-developed. She is ill-appearing. She is not toxic-appearing or diaphoretic. HENT:      Head: Normocephalic and atraumatic. Mouth/Throat:      Mouth: Mucous membranes are moist.   Eyes:      General:         Right eye: No discharge. Left eye: No discharge.    Cardiovascular:      Rate and Rhythm: Normal rate. Pulses: Normal pulses. Pulmonary:      Effort: Pulmonary effort is normal.   Abdominal:      General: Bowel sounds are decreased. There is distension. Palpations: There is mass. Tenderness: There is generalized abdominal tenderness. There is guarding. There is no rebound. Hernia: A hernia is present. Hernia is present in the ventral area. Skin:     General: Skin is warm and dry. Findings: Erythema (Abdominal wall ) present. Neurological:      Mental Status: She is alert.    Psychiatric:         Mood and Affect: Mood normal.         Behavior: Behavior normal.

## 2023-08-12 NOTE — Clinical Note
Case was discussed with Surgery and the patient's admission status was agreed to be Admission Status: inpatient status to the service of Dr. Mary Barry.

## 2023-08-12 NOTE — ED PROVIDER NOTES
History  Chief Complaint   Patient presents with   • Abdominal Pain     Pt presenting w/ RLQ abdominal pain since last night. Hx of hernia, pcp sent pt to see a general surgeon. Pt denies N/V/D. Patient is a pleasant 33-year-old female with a PMHx of HTN, presenting to the ED for evaluation of lower abdominal pain x1 day. Patient states that she has been having intermittent lower abdominal pain for a few weeks. She had a pelvic U/S on 7/26 showing a left ovarian cyst and uterine fibroids as well as an U/S of the groin/inguinal region that was unremarkable. She was seen by her PCP 2 days ago for worsening abdominal wall hernia and was referred to general surgery for follow-up. She states that the pain has been manageable until last night when it became quite severe. The pain has been progressively worsening since last night and is associated with abdominal distention and nausea. She denies any fevers, vomiting, chest pain, shortness of breath or diarrhea. She states that she has not had a bowel movement in at least 2 days but is still passing occasional gas. She has been applying heat and taking Advil without relief. She went to urgent care this afternoon and was subsequently sent to the ED for further evaluation. She denies a history of abdominal surgeries. Prior to Admission Medications   Prescriptions Last Dose Informant Patient Reported?  Taking?   hydrochlorothiazide (HYDRODIURIL) 25 mg tablet   No No   Sig: TAKE ONE TABLET BY MOUTH EVERY MORNING   Patient not taking: Reported on 5/24/2023   hydrocortisone (ANUSOL-HC) 2.5 % rectal cream   No No   Sig: Apply topically 2 (two) times a day   Patient not taking: Reported on 6/16/2023   hydrocortisone (ANUSOL-HC) 25 mg suppository   No No   Sig: Insert 1 suppository (25 mg total) into the rectum 2 (two) times a day   Patient not taking: Reported on 6/16/2023   losartan (COZAAR) 50 mg tablet   No No   Sig: Take 1 tablet (50 mg total) by mouth daily Patient not taking: Reported on 8/12/2023      Facility-Administered Medications: None       Past Medical History:   Diagnosis Date   • Hypertension        History reviewed. No pertinent surgical history. History reviewed. No pertinent family history. I have reviewed and agree with the history as documented. E-Cigarette/Vaping   • E-Cigarette Use Never User      E-Cigarette/Vaping Substances   • Nicotine No    • THC No    • CBD No    • Flavoring No    • Other No    • Unknown No      Social History     Tobacco Use   • Smoking status: Every Day     Packs/day: 1.00     Types: Cigarettes   • Smokeless tobacco: Never   Vaping Use   • Vaping Use: Never used   Substance Use Topics   • Alcohol use: Never   • Drug use: Never       Review of Systems   Constitutional: Negative for fever. HENT: Negative for congestion, rhinorrhea and sore throat. Respiratory: Negative for cough and shortness of breath. Cardiovascular: Negative for chest pain, palpitations and leg swelling. Gastrointestinal: Positive for abdominal distention, abdominal pain and constipation. Negative for blood in stool, diarrhea and vomiting. Genitourinary: Positive for dysuria and frequency. Negative for flank pain and hematuria. Musculoskeletal: Negative for arthralgias and myalgias. Skin: Negative for rash. Neurological: Negative for dizziness, syncope, weakness and headaches. All other systems reviewed and are negative. Physical Exam  Physical Exam  Vitals and nursing note reviewed. Constitutional:       General: She is awake. Appearance: She is ill-appearing and diaphoretic. She is not toxic-appearing. HENT:      Head: Normocephalic and atraumatic. Right Ear: External ear normal.      Left Ear: External ear normal.      Nose: Nose normal.      Mouth/Throat:      Lips: Pink. Mouth: Mucous membranes are moist.   Eyes:      General: Lids are normal. No scleral icterus.      Conjunctiva/sclera: Conjunctivae normal.      Pupils: Pupils are equal, round, and reactive to light. Cardiovascular:      Rate and Rhythm: Regular rhythm. Tachycardia present. Pulses: Normal pulses. Radial pulses are 2+ on the right side and 2+ on the left side. Heart sounds: Normal heart sounds, S1 normal and S2 normal.   Pulmonary:      Effort: Pulmonary effort is normal. No accessory muscle usage. Breath sounds: Normal breath sounds. No stridor. No decreased breath sounds, wheezing, rhonchi or rales. Abdominal:      General: There is distension. Palpations: Abdomen is rigid. Tenderness: There is generalized abdominal tenderness. There is guarding. There is no right CVA tenderness or left CVA tenderness. Hernia: A hernia is present. Hernia is present in the ventral area (above the umbilicus). Comments: The abdomen is distended with diffuse tenderness, guarding and peritonitis. There is patchy erythema over the abdominal wall. There is a midline ventral hernia above the umbilicus. Musculoskeletal:      Cervical back: Full passive range of motion without pain and neck supple. No signs of trauma. No pain with movement. Right lower leg: No edema. Left lower leg: No edema. Lymphadenopathy:      Cervical: No cervical adenopathy. Skin:     General: Skin is warm. Coloration: Skin is not cyanotic, jaundiced or pale. Neurological:      Mental Status: She is alert and oriented to person, place, and time. GCS: GCS eye subscore is 4. GCS verbal subscore is 5. GCS motor subscore is 6. Gait: Gait normal.   Psychiatric:         Mood and Affect: Mood normal.         Speech: Speech normal.         Behavior: Behavior is cooperative.          Vital Signs  ED Triage Vitals [08/12/23 1714]   Temperature Pulse Respirations Blood Pressure SpO2   97.6 °F (36.4 °C) (!) 109 18 103/65 97 %      Temp Source Heart Rate Source Patient Position - Orthostatic VS BP Location FiO2 (%)   Oral Monitor Sitting Right arm --      Pain Score       10 - Worst Possible Pain           Vitals:    08/13/23 1800 08/13/23 1900 08/13/23 1915 08/13/23 1921   BP: 119/60 116/59  116/59   Pulse: 98 101 104 105   Patient Position - Orthostatic VS:    Lying         Visual Acuity  Visual Acuity    Flowsheet Row Most Recent Value   L Pupil Size (mm) 3   R Pupil Size (mm) 3   L Pupil Shape Round   R Pupil Shape Round          ED Medications  Medications   multi-electrolyte (PLASMALYTE-A/ISOLYTE-S PH 7.4) IV solution (125 mL/hr Intravenous New Bag 8/13/23 1224)   ondansetron (ZOFRAN) injection 4 mg ( Intravenous MAR Unhold 8/13/23 0321)   nicotine (NICODERM CQ) 14 mg/24hr TD 24 hr patch 1 patch (1 patch Transdermal Medication Applied 8/13/23 0817)   piperacillin-tazobactam (ZOSYN) 3.375 g in sodium chloride 0.9 % 100 mL IVPB (0 g Intravenous Stopped 8/13/23 1800)   naloxone (NARCAN) injection 0.1 mg (has no administration in time range)   chlorhexidine (PERIDEX) 0.12 % oral rinse 15 mL (15 mL Mouth/Throat Given 8/13/23 0817)   phenol (CHLORASEPTIC) 1.4 % mucosal liquid 1 spray (1 spray Mouth/Throat Given 8/13/23 1848)   enoxaparin (LOVENOX) subcutaneous injection 30 mg (has no administration in time range)   pantoprazole (PROTONIX) injection 40 mg (40 mg Intravenous Given 8/13/23 1341)   HYDROmorphone (DILAUDID) 1 mg/mL 50 mL PCA ( Intravenous Rate/Dose Verify 8/13/23 1915)   levalbuterol (XOPENEX) inhalation solution 1.25 mg (has no administration in time range)   sodium chloride 0.9 % bolus 1,000 mL (0 mL Intravenous Stopped 8/12/23 2100)   HYDROmorphone (DILAUDID) injection 0.5 mg (0.5 mg Intravenous Given 8/12/23 2025)   sodium chloride 0.9 % bolus 1,000 mL (0 mL Intravenous Stopped 8/12/23 2100)   cefepime (MAXIPIME) 2 g/50 mL dextrose IVPB (0 mg Intravenous Stopped 8/12/23 2127)   iohexol (OMNIPAQUE) 350 MG/ML injection (SINGLE-DOSE) 100 mL (100 mL Intravenous Given 8/12/23 2051)   albumin human (FLEXBUMIN) 5 % injection 25 g (0 g Intravenous Stopped 8/13/23 0000)   albumin human (FLEXBUMIN) 5 % injection 25 g (0 g Intravenous Stopped 8/13/23 0529)   potassium chloride 20 mEq IVPB (premix) (0 mEq Intravenous Stopped 8/13/23 1200)   magnesium sulfate IVPB (premix) SOLN 1 g (0 g Intravenous Stopped 8/13/23 0838)   calcium gluconate 2 g in sodium chloride 0.9% 100 mL (premix) (0 g Intravenous Stopped 8/13/23 0838)   multi-electrolyte (ISOLYTE-S PH 7.4) bolus 500 mL (0 mL Intravenous Stopped 8/13/23 1558)       Diagnostic Studies  Results Reviewed     Procedure Component Value Units Date/Time    Blood culture #1 [[id number]] Collected: 08/12/23 2028    Lab Status: Preliminary result Specimen: Blood from Arm, Right Updated: 08/13/23 0001     Blood Culture Received in Microbiology Lab. Culture in Progress. Blood culture #2 [[id number]] Collected: 08/12/23 1951    Lab Status: Preliminary result Specimen: Blood from Arm, Left Updated: 08/13/23 0001     Blood Culture Received in Microbiology Lab. Culture in Progress. Lactic acid 2 Hours [[id number]]  (Abnormal) Collected: 08/12/23 2150    Lab Status: Final result Specimen: Blood from Arm, Right Updated: 08/12/23 2245     LACTIC ACID 2.4 mmol/L     Narrative:      Result may be elevated if tourniquet was used during collection. HS Troponin I 2hr [[id number]]  (Normal) Collected: 08/12/23 2150    Lab Status: Final result Specimen: Blood from Arm, Right Updated: 08/12/23 2230     hs TnI 2hr 5 ng/L      Delta 2hr hsTnI 0 ng/L     Urine Microscopic [[id number]]  (Abnormal) Collected: 08/12/23 2123    Lab Status: Final result Specimen: Urine, Clean Catch Updated: 08/12/23 2223     RBC, UA 1-2 /hpf      WBC, UA Innumerable /hpf      Epithelial Cells Occasional /hpf      Bacteria, UA Occasional /hpf      MUCUS THREADS Occasional     Hyaline Casts, UA 3-5 /lpf      WBC Casts, UA 5-10 /lpf      WBC Clumps Present    Urine culture [[id number]] Collected: 08/12/23 2123    Lab Status:  In process Specimen: Urine, Clean Catch Updated: 08/12/23 2223    Platelet count [[id number]]  (Normal) Collected: 08/12/23 2149    Lab Status: Final result Specimen: Blood from Arm, Right Updated: 08/12/23 2210     Platelets 506 Thousands/uL      MPV 9.9 fL     UA w Reflex to Microscopic w Reflex to Culture [[id number]]  (Abnormal) Collected: 08/12/23 2123    Lab Status: Final result Specimen: Urine, Clean Catch Updated: 08/12/23 2154     Color, UA Yellow     Clarity, UA Turbid     Specific Gravity, UA 1.025     pH, UA 6.0     Leukocytes, UA Large     Nitrite, UA Negative     Protein, UA Trace mg/dl      Glucose, UA Negative mg/dl      Ketones, UA Negative mg/dl      Urobilinogen, UA 2.0 mg/dl      Bilirubin, UA Negative     Occult Blood, UA Moderate    Magnesium [[id number]]  (Abnormal) Collected: 08/12/23 1722    Lab Status: Final result Specimen: Blood from Arm, Right Updated: 08/12/23 2108     Magnesium 1.7 mg/dL     Procalcitonin [[id number]]  (Abnormal) Collected: 08/12/23 1722    Lab Status: Final result Specimen: Blood from Arm, Right Updated: 08/12/23 2108     Procalcitonin 14.61 ng/ml     Lactic acid, plasma (w/reflex if result > 2.0) [[id number]]  (Abnormal) Collected: 08/12/23 1951    Lab Status: Final result Specimen: Blood from Arm, Left Updated: 08/12/23 2029     LACTIC ACID 3.0 mmol/L     Narrative:      Result may be elevated if tourniquet was used during collection.     HS Troponin 0hr (reflex protocol) [[id number]]  (Normal) Collected: 08/12/23 1951    Lab Status: Final result Specimen: Blood from Arm, Left Updated: 08/12/23 2021     hs TnI 0hr 5 ng/L     Protime-INR [[id number]]  (Abnormal) Collected: 08/12/23 1951    Lab Status: Final result Specimen: Blood from Arm, Left Updated: 08/12/23 2018     Protime 16.2 seconds      INR 1.23    APTT [[id number]]  (Normal) Collected: 08/12/23 1951    Lab Status: Final result Specimen: Blood from Arm, Left Updated: 08/12/23 2018     PTT 31 seconds     RBC Morphology Reflex Test [[id number]] Collected: 08/12/23 1722    Lab Status: Final result Specimen: Blood from Arm, Right Updated: 08/12/23 1901    CBC and differential [[id number]]  (Abnormal) Collected: 08/12/23 1722    Lab Status: Final result Specimen: Blood from Arm, Right Updated: 08/12/23 1804     WBC 17.72 Thousand/uL      RBC 5.22 Million/uL      Hemoglobin 13.2 g/dL      Hematocrit 40.7 %      MCV 78 fL      MCH 25.3 pg      MCHC 32.4 g/dL      RDW 17.9 %      MPV 9.6 fL      Platelets 311 Thousands/uL     Narrative: This is an appended report. These results have been appended to a previously verified report.     Manual Differential(PHLEBS Do Not Order) [[id number]]  (Abnormal) Collected: 08/12/23 1722    Lab Status: Final result Specimen: Blood from Arm, Right Updated: 08/12/23 1804     Segmented % 70 %      Bands % 20 %      Lymphocytes % 6 %      Monocytes % 1 %      Eosinophils, % 0 %      Basophils % 0 %      Metamyelocytes% 1 %      Atypical Lymphocytes % 2 %      Absolute Neutrophils 15.95 Thousand/uL      Lymphocytes Absolute 1.42 Thousand/uL      Monocytes Absolute 0.18 Thousand/uL      Eosinophils Absolute 0.00 Thousand/uL      Basophils Absolute 0.00 Thousand/uL      Total Counted --     RBC Morphology Present     Platelet Estimate Adequate     Clumped Platelets Present     Stomatocytes Present    Lipase [[id number]]  (Abnormal) Collected: 08/12/23 1722    Lab Status: Final result Specimen: Blood from Arm, Right Updated: 08/12/23 1753     Lipase <6 u/L     Comprehensive metabolic panel [[id number]]  (Abnormal) Collected: 08/12/23 1722    Lab Status: Final result Specimen: Blood from Arm, Right Updated: 08/12/23 1753     Sodium 138 mmol/L      Potassium 3.3 mmol/L      Chloride 102 mmol/L      CO2 23 mmol/L      ANION GAP 13 mmol/L      BUN 27 mg/dL      Creatinine 1.06 mg/dL      Glucose 113 mg/dL      Calcium 9.1 mg/dL      AST 10 U/L      ALT 7 U/L      Alkaline Phosphatase 186 U/L      Total Protein 7.2 g/dL Albumin 3.5 g/dL      Total Bilirubin 2.30 mg/dL      eGFR 55 ml/min/1.73sq m     Narrative:      [place] guidelines for Chronic Kidney Disease (CKD):   •  Stage 1 with normal or high GFR (GFR > 90 mL/min/1.73 square meters)  •  Stage 2 Mild CKD (GFR = 60-89 mL/min/1.73 square meters)  •  Stage 3A Moderate CKD (GFR = 45-59 mL/min/1.73 square meters)  •  Stage 3B Moderate CKD (GFR = 30-44 mL/min/1.73 square meters)  •  Stage 4 Severe CKD (GFR = 15-29 mL/min/1.73 square meters)  •  Stage 5 End Stage CKD (GFR <15 mL/min/1.73 square meters)  Note: GFR calculation is accurate only with a steady state creatinine                 CT abdomen pelvis with contrast   Final Result by Keke Kolb MD (08/12 2116)      1. Findings of acute sigmoid diverticulitis with perforation causing multiple phlegmonous fluid collections and pneumoperitoneum. Colovesical fistula with diffuse bladder wall thickening . Diffuse small bowel wall thickening in the pelvis secondary to adjacent diverticulitis causing an ileus pattern. 2. Large gallstones in the gallbladder. 3. Large simple hepatic cyst posteriorly measuring 13 x 10 cm. I personally discussed this study with Dr. Magalie Armenta on 8/12/2023 9:12 PM.            Workstation performed: [id number]         XR chest 1 view portable   Final Result by Russell Rivera MD (08/13 1307)      No acute cardiopulmonary disease. Pneumoperitoneum as noted in subsequent CT.                   Workstation performed: [id number]         XR chest portable    (Results Pending)              Procedures  ECG 12 Lead Documentation Only    Date/Time: 8/12/2023 9:17 PM    Performed by: Caesar Hou PA-C  Authorized by: Caesar Hou PA-C    Indications / Diagnosis:  Tachycardia  ECG reviewed by me, the ED Provider: yes    Patient location:  ED  Previous ECG:     Previous ECG:  Unavailable    Comparison to cardiac monitor: Yes    Rate:     ECG rate: 108    ECG rate assessment: tachycardic    Rhythm:     Rhythm: sinus tachycardia    Ectopy:     Ectopy: PVCs      PVCs:  Infrequent  QRS:     QRS axis:  Normal    QRS intervals:  Normal  T waves:     T waves: inverted      Inverted:  I, aVL and V2  Comments:      No STEMI. QT/QTc 384/514    CriticalCare Time    Date/Time: 8/12/2023 9:23 PM    Performed by: Haleigh Jackson PA-C  Authorized by: Haleigh Jackson PA-C    Critical care provider statement:     Critical care time (minutes):  45    Critical care time was exclusive of:  Separately billable procedures and treating other patients    Critical care was necessary to treat or prevent imminent or life-threatening deterioration of the following conditions:  Sepsis and shock    Critical care was time spent personally by me on the following activities:  Obtaining history from patient or surrogate, development of treatment plan with patient or surrogate, discussions with consultants, evaluation of patient's response to treatment, examination of patient, ordering and performing treatments and interventions, ordering and review of laboratory studies, ordering and review of radiographic studies, re-evaluation of patient's condition and review of old charts    I assumed direction of critical care for this patient from another provider in my specialty: no               ED Course                            Initial Sepsis Screening     Row Name 08/12/23 1948                Is the patient's history suggestive of a new or worsening infection? Yes (Proceed)  -LH        Suspected source of infection acute abdominal infection  -LH        Indicate SIRS criteria Tachycardia > 90 bpm;Tachypnea > 20 resp per min;Leukocytosis (WBC > 82686 IJL) OR Leukopenia (WBC <4000 IJL) OR Bandemia (WBC >10% bands)  -LH        Are two or more of the above signs & symptoms of infection both present and new to the patient?  Yes (Proceed)  -LH        Assess for evidence of organ dysfunction: Are any of the below criteria present within 6 hours of suspected infection and SIRS criteria that are NOT considered to be chronic conditions? Bilirubin > 2. 0;Lactate > 2.0  -LH        Date of presentation of severe sepsis --        Time of presentation of severe sepsis --        Sepsis Note: Click "NEXT" below (NOT "close") to generate sepsis note based on above information. YES (proceed by clicking "NEXT")  -[facility]  (r) = Recorded By, (t) = Taken By, (c) = Cosigned By    [facility] Name Provider Type    [address] [place], PA-C Physician Assistant                SBIRT 22yo+    Flowsheet Row Most Recent Value   Initial Alcohol Screen: US AUDIT-C     1. How often do you have a drink containing alcohol? 0 Filed at: 08/12/2023 2133   2. How many drinks containing alcohol do you have on a typical day you are drinking? 0 Filed at: 08/12/2023 2133   3a. Male UNDER 65: How often do you have five or more drinks on one occasion? 0 Filed at: 08/12/2023 2133   3b. FEMALE Any Age, or MALE 65+: How often do you have 4 or more drinks on one occassion? 0 Filed at: 08/12/2023 2133   Audit-C Score 0 Filed at: 08/12/2023 2133   RICHELLE: How many times in the past year have you. .. Used an illegal drug or used a prescription medication for non-medical reasons? Never Filed at: 08/12/2023 2133                    Medical Decision Making  Patient is a pleasant 77-year-old female with a PMHx of HTN, presenting to the ED for evaluation of lower abdominal pain x1 day. Patient's initial labs were drawn in triage and are notable for a significant leukocytosis of 17.72 with left shift and 20% bands as well as a Tbili of 2.3. She met SIRS criteria upon arrival with tachycardia, tachypnea and leukocytosis/bandemia. Sepsis panel labs, IV antibiotics and fluids ordered. Patient has diffuse tenderness and peritonitis on exam concerning for perforated viscus/pneumoperitoneum.  CT a/p ordered and general surgery was contacted for emergent consult. CT showed acute sigmoid diverticulitis with perforation causing multiple phlegmonous fluid collections and pneumoperitoneum as well as a colovesical fistula with diffuse bladder wall thickening. General surgery evaluated the patient and took her directly to the OR for surgical management. Colovesical fistula: acute illness or injury  Diverticulitis of large intestine with perforation and abscess: acute illness or injury  Pneumoperitoneum: acute illness or injury  Severe sepsis [facility]): acute illness or injury  Amount and/or Complexity of Data Reviewed  Labs: ordered. Radiology: ordered. Discussion of management or test interpretation with external provider(s): Dr. Jacquelyne Boas (surgery)    Risk  Prescription drug management. Decision regarding hospitalization. Emergency major surgery.           Disposition  Final diagnoses:   Diverticulitis of large intestine with perforation and abscess   Pneumoperitoneum   Colovesical fistula   Cholelithiasis   Severe sepsis ([address])   Hepatic cyst     Time reflects when diagnosis was documented in both MDM as applicable and the Disposition within this note     Time User Action Codes Description Comment    8/12/2023  9:30 PM Ronald Ahle Add [K57.20] Diverticulitis of large intestine with perforation and abscess     8/12/2023  9:30 PM Ronald Ahle Add [K66.8] Pneumoperitoneum     8/12/2023  9:30 PM Ronald Ahle Add [N32.1] Colovesical fistula     8/12/2023  9:31 PM YazYisel powell Add [K80.20] Cholelithiasis     8/12/2023  9:31 PM Yisel Mukherjee Add [A41.9,  R65.20] Severe sepsis ([address])     8/12/2023  9:31 PM Ronald Ahle Add [K76.89] Hepatic cyst     8/13/2023  1:03 AM Nathaniel Phillips Modify [K57.20] Diverticulitis of large intestine with perforation and abscess       ED Disposition     ED Disposition   Send to OR    Condition   --    Date/Time   Sat Aug 12, 2023  9:32 PM    Comment   Case was discussed with Surgery and the patient's admission status was agreed to be Admission Status: inpatient status to the service of Dr. Anmol Petersen. Follow-up Information    None         Current Discharge Medication List      CONTINUE these medications which have NOT CHANGED    Details   hydrochlorothiazide (HYDRODIURIL) 25 mg tablet TAKE ONE TABLET BY MOUTH EVERY MORNING  Qty: 90 tablet, Refills: 1    Associated Diagnoses: Hypertension, unspecified type      hydrocortisone (ANUSOL-HC) 2.5 % rectal cream Apply topically 2 (two) times a day  Qty: 28 g, Refills: 1    Associated Diagnoses: Hemorrhoids, unspecified hemorrhoid type      hydrocortisone (ANUSOL-HC) 25 mg suppository Insert 1 suppository (25 mg total) into the rectum 2 (two) times a day  Qty: 12 suppository, Refills: 0    Associated Diagnoses: Hemorrhoids, unspecified hemorrhoid type      losartan (COZAAR) 50 mg tablet Take 1 tablet (50 mg total) by mouth daily  Qty: 90 tablet, Refills: 0    Associated Diagnoses: Hypertension, unspecified type             No discharge procedures on file.     PDMP Review     None          ED Provider  Electronically Signed by           Marcus Benitez PA-C  08/13/23 2006

## 2023-08-13 ENCOUNTER — APPOINTMENT (INPATIENT)
Dept: RADIOLOGY | Facility: HOSPITAL | Age: 63
DRG: 853 | End: 2023-08-13
Payer: COMMERCIAL

## 2023-08-13 LAB
ABO GROUP BLD: NORMAL
ABO GROUP BLD: NORMAL
ALBUMIN SERPL BCP-MCNC: 3 G/DL (ref 3.5–5)
ALBUMIN SERPL BCP-MCNC: 3 G/DL (ref 3.5–5)
ALP SERPL-CCNC: 106 U/L (ref 34–104)
ALP SERPL-CCNC: 88 U/L (ref 34–104)
ALT SERPL W P-5'-P-CCNC: 13 U/L (ref 7–52)
ALT SERPL W P-5'-P-CCNC: 14 U/L (ref 7–52)
ANION GAP SERPL CALCULATED.3IONS-SCNC: 10 MMOL/L
ANION GAP SERPL CALCULATED.3IONS-SCNC: 8 MMOL/L
AST SERPL W P-5'-P-CCNC: 34 U/L (ref 13–39)
AST SERPL W P-5'-P-CCNC: 37 U/L (ref 13–39)
BASE EX.OXY STD BLDV CALC-SCNC: 64.9 % (ref 60–80)
BASE EX.OXY STD BLDV CALC-SCNC: 82.1 % (ref 60–80)
BASE EXCESS BLDV CALC-SCNC: -4.6 MMOL/L
BASE EXCESS BLDV CALC-SCNC: -7.3 MMOL/L
BILIRUB DIRECT SERPL-MCNC: 1.3 MG/DL (ref 0–0.2)
BILIRUB SERPL-MCNC: 1.18 MG/DL (ref 0.2–1)
BILIRUB SERPL-MCNC: 1.8 MG/DL (ref 0.2–1)
BLD GP AB SCN SERPL QL: NEGATIVE
BUN SERPL-MCNC: 22 MG/DL (ref 5–25)
BUN SERPL-MCNC: 26 MG/DL (ref 5–25)
CA-I BLD-SCNC: 0.99 MMOL/L (ref 1.12–1.32)
CALCIUM ALBUM COR SERPL-MCNC: 8.5 MG/DL (ref 8.3–10.1)
CALCIUM SERPL-MCNC: 7.1 MG/DL (ref 8.4–10.2)
CALCIUM SERPL-MCNC: 7.7 MG/DL (ref 8.4–10.2)
CHLORIDE SERPL-SCNC: 111 MMOL/L (ref 96–108)
CHLORIDE SERPL-SCNC: 112 MMOL/L (ref 96–108)
CO2 SERPL-SCNC: 19 MMOL/L (ref 21–32)
CO2 SERPL-SCNC: 20 MMOL/L (ref 21–32)
CREAT SERPL-MCNC: 0.73 MG/DL (ref 0.6–1.3)
CREAT SERPL-MCNC: 0.88 MG/DL (ref 0.6–1.3)
ERYTHROCYTE [DISTWIDTH] IN BLOOD BY AUTOMATED COUNT: 17.9 % (ref 11.6–15.1)
ERYTHROCYTE [DISTWIDTH] IN BLOOD BY AUTOMATED COUNT: 18.1 % (ref 11.6–15.1)
GFR SERPL CREATININE-BSD FRML MDRD: 69 ML/MIN/1.73SQ M
GFR SERPL CREATININE-BSD FRML MDRD: 86 ML/MIN/1.73SQ M
GLUCOSE SERPL-MCNC: 102 MG/DL (ref 65–140)
GLUCOSE SERPL-MCNC: 103 MG/DL (ref 65–140)
GLUCOSE SERPL-MCNC: 112 MG/DL (ref 65–140)
GLUCOSE SERPL-MCNC: 132 MG/DL (ref 65–140)
HCO3 BLDV-SCNC: 19.9 MMOL/L (ref 24–30)
HCO3 BLDV-SCNC: 21 MMOL/L (ref 24–30)
HCT VFR BLD AUTO: 28.4 % (ref 34.8–46.1)
HCT VFR BLD AUTO: 32.6 % (ref 34.8–46.1)
HGB BLD-MCNC: 10.4 G/DL (ref 11.5–15.4)
HGB BLD-MCNC: 9.2 G/DL (ref 11.5–15.4)
LACTATE SERPL-SCNC: 1 MMOL/L (ref 0.5–2)
MAGNESIUM SERPL-MCNC: 1.7 MG/DL (ref 1.9–2.7)
MCH RBC QN AUTO: 25.2 PG (ref 26.8–34.3)
MCH RBC QN AUTO: 25.6 PG (ref 26.8–34.3)
MCHC RBC AUTO-ENTMCNC: 31.9 G/DL (ref 31.4–37.4)
MCHC RBC AUTO-ENTMCNC: 32.4 G/DL (ref 31.4–37.4)
MCV RBC AUTO: 79 FL (ref 82–98)
MCV RBC AUTO: 79 FL (ref 82–98)
NASAL CANNULA: 6
NRBC BLD AUTO-RTO: 0 /100 WBCS
O2 CT BLDV-SCNC: 14.3 ML/DL
O2 CT BLDV-SCNC: 9.1 ML/DL
PCO2 BLDV: 40.8 MM HG (ref 42–50)
PCO2 BLDV: 46.6 MM HG (ref 42–50)
PH BLDV: 7.25 [PH] (ref 7.3–7.4)
PH BLDV: 7.33 [PH] (ref 7.3–7.4)
PHOSPHATE SERPL-MCNC: 5.6 MG/DL (ref 2.3–4.1)
PLATELET # BLD AUTO: 298 THOUSANDS/UL (ref 149–390)
PLATELET # BLD AUTO: 336 THOUSANDS/UL (ref 149–390)
PMV BLD AUTO: 10 FL (ref 8.9–12.7)
PMV BLD AUTO: 10.2 FL (ref 8.9–12.7)
PO2 BLDV: 34.2 MM HG (ref 35–45)
PO2 BLDV: 53.2 MM HG (ref 35–45)
POTASSIUM SERPL-SCNC: 3.2 MMOL/L (ref 3.5–5.3)
POTASSIUM SERPL-SCNC: 5.5 MMOL/L (ref 3.5–5.3)
PROCALCITONIN SERPL-MCNC: 20.26 NG/ML
PROT SERPL-MCNC: 5.3 G/DL (ref 6.4–8.4)
PROT SERPL-MCNC: 5.5 G/DL (ref 6.4–8.4)
RBC # BLD AUTO: 3.6 MILLION/UL (ref 3.81–5.12)
RBC # BLD AUTO: 4.12 MILLION/UL (ref 3.81–5.12)
RH BLD: NEGATIVE
RH BLD: NEGATIVE
SODIUM SERPL-SCNC: 140 MMOL/L (ref 135–147)
SODIUM SERPL-SCNC: 140 MMOL/L (ref 135–147)
SPECIMEN EXPIRATION DATE: NORMAL
WBC # BLD AUTO: 13.26 THOUSAND/UL (ref 4.31–10.16)
WBC # BLD AUTO: 13.61 THOUSAND/UL (ref 4.31–10.16)

## 2023-08-13 PROCEDURE — 99024 POSTOP FOLLOW-UP VISIT: CPT | Performed by: SURGERY

## 2023-08-13 PROCEDURE — 82330 ASSAY OF CALCIUM: CPT | Performed by: PHYSICIAN ASSISTANT

## 2023-08-13 PROCEDURE — C1765 ADHESION BARRIER: HCPCS | Performed by: SURGERY

## 2023-08-13 PROCEDURE — 82805 BLOOD GASES W/O2 SATURATION: CPT | Performed by: PHYSICIAN ASSISTANT

## 2023-08-13 PROCEDURE — 85027 COMPLETE CBC AUTOMATED: CPT

## 2023-08-13 PROCEDURE — 83735 ASSAY OF MAGNESIUM: CPT | Performed by: PHYSICIAN ASSISTANT

## 2023-08-13 PROCEDURE — 86900 BLOOD TYPING SEROLOGIC ABO: CPT | Performed by: NURSE ANESTHETIST, CERTIFIED REGISTERED

## 2023-08-13 PROCEDURE — 99255 IP/OBS CONSLTJ NEW/EST HI 80: CPT | Performed by: SURGERY

## 2023-08-13 PROCEDURE — 86850 RBC ANTIBODY SCREEN: CPT | Performed by: NURSE ANESTHETIST, CERTIFIED REGISTERED

## 2023-08-13 PROCEDURE — 82805 BLOOD GASES W/O2 SATURATION: CPT

## 2023-08-13 PROCEDURE — 88304 TISSUE EXAM BY PATHOLOGIST: CPT | Performed by: PATHOLOGY

## 2023-08-13 PROCEDURE — 88302 TISSUE EXAM BY PATHOLOGIST: CPT | Performed by: PATHOLOGY

## 2023-08-13 PROCEDURE — 84100 ASSAY OF PHOSPHORUS: CPT | Performed by: PHYSICIAN ASSISTANT

## 2023-08-13 PROCEDURE — 84145 PROCALCITONIN (PCT): CPT | Performed by: PHYSICIAN ASSISTANT

## 2023-08-13 PROCEDURE — 83605 ASSAY OF LACTIC ACID: CPT | Performed by: PHYSICIAN ASSISTANT

## 2023-08-13 PROCEDURE — 85027 COMPLETE CBC AUTOMATED: CPT | Performed by: PHYSICIAN ASSISTANT

## 2023-08-13 PROCEDURE — 71045 X-RAY EXAM CHEST 1 VIEW: CPT

## 2023-08-13 PROCEDURE — 86901 BLOOD TYPING SEROLOGIC RH(D): CPT | Performed by: NURSE ANESTHETIST, CERTIFIED REGISTERED

## 2023-08-13 PROCEDURE — 80076 HEPATIC FUNCTION PANEL: CPT | Performed by: PHYSICIAN ASSISTANT

## 2023-08-13 PROCEDURE — 80053 COMPREHEN METABOLIC PANEL: CPT

## 2023-08-13 PROCEDURE — 44660 REPAIR BOWEL-BLADDER FISTULA: CPT | Performed by: SURGERY

## 2023-08-13 PROCEDURE — 94664 DEMO&/EVAL PT USE INHALER: CPT

## 2023-08-13 PROCEDURE — 80048 BASIC METABOLIC PNL TOTAL CA: CPT | Performed by: PHYSICIAN ASSISTANT

## 2023-08-13 PROCEDURE — 82948 REAGENT STRIP/BLOOD GLUCOSE: CPT

## 2023-08-13 PROCEDURE — 94760 N-INVAS EAR/PLS OXIMETRY 1: CPT

## 2023-08-13 PROCEDURE — 94640 AIRWAY INHALATION TREATMENT: CPT

## 2023-08-13 PROCEDURE — 94762 N-INVAS EAR/PLS OXIMTRY CONT: CPT

## 2023-08-13 PROCEDURE — 88307 TISSUE EXAM BY PATHOLOGIST: CPT | Performed by: PATHOLOGY

## 2023-08-13 PROCEDURE — C9113 INJ PANTOPRAZOLE SODIUM, VIA: HCPCS

## 2023-08-13 RX ORDER — ALBUMIN, HUMAN INJ 5% 5 %
SOLUTION INTRAVENOUS CONTINUOUS PRN
Status: DISCONTINUED | OUTPATIENT
Start: 2023-08-13 | End: 2023-08-13

## 2023-08-13 RX ORDER — DEXAMETHASONE SODIUM PHOSPHATE 10 MG/ML
INJECTION, SOLUTION INTRAMUSCULAR; INTRAVENOUS AS NEEDED
Status: DISCONTINUED | OUTPATIENT
Start: 2023-08-13 | End: 2023-08-13

## 2023-08-13 RX ORDER — BUPIVACAINE HYDROCHLORIDE 2.5 MG/ML
INJECTION, SOLUTION EPIDURAL; INFILTRATION; INTRACAUDAL
Status: DISCONTINUED | OUTPATIENT
Start: 2023-08-13 | End: 2023-08-13

## 2023-08-13 RX ORDER — ROCURONIUM BROMIDE 10 MG/ML
INJECTION, SOLUTION INTRAVENOUS AS NEEDED
Status: DISCONTINUED | OUTPATIENT
Start: 2023-08-13 | End: 2023-08-13

## 2023-08-13 RX ORDER — ALBUMIN, HUMAN INJ 5% 5 %
25 SOLUTION INTRAVENOUS ONCE
Status: COMPLETED | OUTPATIENT
Start: 2023-08-13 | End: 2023-08-13

## 2023-08-13 RX ORDER — ONDANSETRON 2 MG/ML
INJECTION INTRAMUSCULAR; INTRAVENOUS AS NEEDED
Status: DISCONTINUED | OUTPATIENT
Start: 2023-08-13 | End: 2023-08-13

## 2023-08-13 RX ORDER — SODIUM CHLORIDE, SODIUM GLUCONATE, SODIUM ACETATE, POTASSIUM CHLORIDE, MAGNESIUM CHLORIDE, SODIUM PHOSPHATE, DIBASIC, AND POTASSIUM PHOSPHATE .53; .5; .37; .037; .03; .012; .00082 G/100ML; G/100ML; G/100ML; G/100ML; G/100ML; G/100ML; G/100ML
500 INJECTION, SOLUTION INTRAVENOUS ONCE
Status: COMPLETED | OUTPATIENT
Start: 2023-08-13 | End: 2023-08-13

## 2023-08-13 RX ORDER — SODIUM CHLORIDE 9 MG/ML
INJECTION, SOLUTION INTRAVENOUS CONTINUOUS PRN
Status: DISCONTINUED | OUTPATIENT
Start: 2023-08-13 | End: 2023-08-13

## 2023-08-13 RX ORDER — FENTANYL CITRATE 50 UG/ML
INJECTION, SOLUTION INTRAMUSCULAR; INTRAVENOUS AS NEEDED
Status: DISCONTINUED | OUTPATIENT
Start: 2023-08-13 | End: 2023-08-13

## 2023-08-13 RX ORDER — PROPOFOL 10 MG/ML
INJECTION, EMULSION INTRAVENOUS AS NEEDED
Status: DISCONTINUED | OUTPATIENT
Start: 2023-08-13 | End: 2023-08-13

## 2023-08-13 RX ORDER — LEVALBUTEROL INHALATION SOLUTION 1.25 MG/3ML
1.25 SOLUTION RESPIRATORY (INHALATION)
Status: DISCONTINUED | OUTPATIENT
Start: 2023-08-13 | End: 2023-08-18 | Stop reason: HOSPADM

## 2023-08-13 RX ORDER — CALCIUM GLUCONATE 20 MG/ML
2 INJECTION, SOLUTION INTRAVENOUS ONCE
Status: COMPLETED | OUTPATIENT
Start: 2023-08-13 | End: 2023-08-13

## 2023-08-13 RX ORDER — PANTOPRAZOLE SODIUM 40 MG/10ML
40 INJECTION, POWDER, LYOPHILIZED, FOR SOLUTION INTRAVENOUS
Status: DISCONTINUED | OUTPATIENT
Start: 2023-08-13 | End: 2023-08-18 | Stop reason: HOSPADM

## 2023-08-13 RX ORDER — SUCCINYLCHOLINE/SOD CL,ISO/PF 100 MG/5ML
SYRINGE (ML) INTRAVENOUS AS NEEDED
Status: DISCONTINUED | OUTPATIENT
Start: 2023-08-13 | End: 2023-08-13

## 2023-08-13 RX ORDER — LIDOCAINE HYDROCHLORIDE 10 MG/ML
INJECTION, SOLUTION EPIDURAL; INFILTRATION; INTRACAUDAL; PERINEURAL AS NEEDED
Status: DISCONTINUED | OUTPATIENT
Start: 2023-08-13 | End: 2023-08-13

## 2023-08-13 RX ORDER — MAGNESIUM SULFATE 1 G/100ML
1 INJECTION INTRAVENOUS ONCE
Status: COMPLETED | OUTPATIENT
Start: 2023-08-13 | End: 2023-08-13

## 2023-08-13 RX ORDER — MAGNESIUM HYDROXIDE 1200 MG/15ML
LIQUID ORAL AS NEEDED
Status: DISCONTINUED | OUTPATIENT
Start: 2023-08-13 | End: 2023-08-13 | Stop reason: HOSPADM

## 2023-08-13 RX ORDER — NALOXONE HYDROCHLORIDE 0.4 MG/ML
0.1 INJECTION, SOLUTION INTRAMUSCULAR; INTRAVENOUS; SUBCUTANEOUS
Status: DISCONTINUED | OUTPATIENT
Start: 2023-08-13 | End: 2023-08-18 | Stop reason: HOSPADM

## 2023-08-13 RX ORDER — HYDROMORPHONE HCL/PF 1 MG/ML
SYRINGE (ML) INJECTION AS NEEDED
Status: DISCONTINUED | OUTPATIENT
Start: 2023-08-13 | End: 2023-08-13

## 2023-08-13 RX ORDER — ENOXAPARIN SODIUM 100 MG/ML
30 INJECTION SUBCUTANEOUS EVERY 12 HOURS SCHEDULED
Status: DISCONTINUED | OUTPATIENT
Start: 2023-08-13 | End: 2023-08-18 | Stop reason: HOSPADM

## 2023-08-13 RX ORDER — CHLORHEXIDINE GLUCONATE 0.12 MG/ML
15 RINSE ORAL EVERY 12 HOURS SCHEDULED
Status: DISCONTINUED | OUTPATIENT
Start: 2023-08-13 | End: 2023-08-18 | Stop reason: HOSPADM

## 2023-08-13 RX ORDER — POTASSIUM CHLORIDE 14.9 MG/ML
20 INJECTION INTRAVENOUS
Status: COMPLETED | OUTPATIENT
Start: 2023-08-13 | End: 2023-08-13

## 2023-08-13 RX ADMIN — ROCURONIUM BROMIDE 50 MG: 10 INJECTION, SOLUTION INTRAVENOUS at 00:23

## 2023-08-13 RX ADMIN — HYDROMORPHONE HYDROCHLORIDE 0.5 MG: 1 INJECTION, SOLUTION INTRAMUSCULAR; INTRAVENOUS; SUBCUTANEOUS at 00:39

## 2023-08-13 RX ADMIN — SODIUM CHLORIDE, SODIUM GLUCONATE, SODIUM ACETATE, POTASSIUM CHLORIDE AND MAGNESIUM CHLORIDE 125 ML/HR: 526; 502; 368; 37; 30 INJECTION, SOLUTION INTRAVENOUS at 05:07

## 2023-08-13 RX ADMIN — Medication: at 14:30

## 2023-08-13 RX ADMIN — ENOXAPARIN SODIUM 30 MG: 30 INJECTION SUBCUTANEOUS at 21:14

## 2023-08-13 RX ADMIN — BUPIVACAINE HYDROCHLORIDE 20 ML: 2.5 INJECTION, SOLUTION EPIDURAL; INFILTRATION; INTRACAUDAL; PERINEURAL at 03:00

## 2023-08-13 RX ADMIN — ALBUMIN (HUMAN) 25 G: 12.5 INJECTION, SOLUTION INTRAVENOUS at 03:57

## 2023-08-13 RX ADMIN — ROCURONIUM BROMIDE 10 MG: 10 INJECTION, SOLUTION INTRAVENOUS at 01:36

## 2023-08-13 RX ADMIN — DEXMEDETOMIDINE HYDROCHLORIDE 0.2 MCG/KG/HR: 100 INJECTION, SOLUTION INTRAVENOUS at 00:39

## 2023-08-13 RX ADMIN — PIPERACILLIN AND TAZOBACTAM 3.38 G: 36; 4.5 INJECTION, POWDER, FOR SOLUTION INTRAVENOUS at 21:15

## 2023-08-13 RX ADMIN — Medication: at 10:15

## 2023-08-13 RX ADMIN — PIPERACILLIN AND TAZOBACTAM 3.38 G: 36; 4.5 INJECTION, POWDER, FOR SOLUTION INTRAVENOUS at 04:14

## 2023-08-13 RX ADMIN — PANTOPRAZOLE SODIUM 40 MG: 40 INJECTION, POWDER, FOR SOLUTION INTRAVENOUS at 13:41

## 2023-08-13 RX ADMIN — HEPARIN SODIUM 5000 UNITS: 5000 INJECTION INTRAVENOUS; SUBCUTANEOUS at 05:29

## 2023-08-13 RX ADMIN — Medication 1 SPRAY: at 15:35

## 2023-08-13 RX ADMIN — ALBUMIN (HUMAN): 12.5 INJECTION, SOLUTION INTRAVENOUS at 00:29

## 2023-08-13 RX ADMIN — PIPERACILLIN AND TAZOBACTAM 3.38 G: 36; 4.5 INJECTION, POWDER, FOR SOLUTION INTRAVENOUS at 16:54

## 2023-08-13 RX ADMIN — FENTANYL CITRATE 100 MCG: 50 INJECTION INTRAMUSCULAR; INTRAVENOUS at 00:11

## 2023-08-13 RX ADMIN — ONDANSETRON 4 MG: 2 INJECTION INTRAMUSCULAR; INTRAVENOUS at 01:59

## 2023-08-13 RX ADMIN — SODIUM CHLORIDE: 0.9 INJECTION, SOLUTION INTRAVENOUS at 00:29

## 2023-08-13 RX ADMIN — SODIUM CHLORIDE, SODIUM GLUCONATE, SODIUM ACETATE, POTASSIUM CHLORIDE AND MAGNESIUM CHLORIDE 125 ML/HR: 526; 502; 368; 37; 30 INJECTION, SOLUTION INTRAVENOUS at 12:24

## 2023-08-13 RX ADMIN — LIDOCAINE HYDROCHLORIDE 50 MG: 10 INJECTION, SOLUTION EPIDURAL; INFILTRATION; INTRACAUDAL at 00:11

## 2023-08-13 RX ADMIN — CALCIUM GLUCONATE 2 G: 20 INJECTION, SOLUTION INTRAVENOUS at 07:20

## 2023-08-13 RX ADMIN — POTASSIUM CHLORIDE 20 MEQ: 14.9 INJECTION, SOLUTION INTRAVENOUS at 07:21

## 2023-08-13 RX ADMIN — MAGNESIUM SULFATE HEPTAHYDRATE 1 G: 1 INJECTION, SOLUTION INTRAVENOUS at 07:29

## 2023-08-13 RX ADMIN — PIPERACILLIN AND TAZOBACTAM 3.38 G: 36; 4.5 INJECTION, POWDER, FOR SOLUTION INTRAVENOUS at 09:55

## 2023-08-13 RX ADMIN — DEXAMETHASONE SODIUM PHOSPHATE 10 MG: 10 INJECTION, SOLUTION INTRAMUSCULAR; INTRAVENOUS at 00:56

## 2023-08-13 RX ADMIN — Medication 1 SPRAY: at 18:48

## 2023-08-13 RX ADMIN — DEXMEDETOMIDINE HYDROCHLORIDE 12 MCG: 100 INJECTION, SOLUTION INTRAVENOUS at 00:35

## 2023-08-13 RX ADMIN — CHLORHEXIDINE GLUCONATE 15 ML: 1.2 SOLUTION ORAL at 08:17

## 2023-08-13 RX ADMIN — Medication 100 MG: at 00:11

## 2023-08-13 RX ADMIN — CHLORHEXIDINE GLUCONATE 15 ML: 1.2 SOLUTION ORAL at 21:15

## 2023-08-13 RX ADMIN — POTASSIUM CHLORIDE 20 MEQ: 14.9 INJECTION, SOLUTION INTRAVENOUS at 08:38

## 2023-08-13 RX ADMIN — SODIUM CHLORIDE, SODIUM GLUCONATE, SODIUM ACETATE, POTASSIUM CHLORIDE, MAGNESIUM CHLORIDE, SODIUM PHOSPHATE, DIBASIC, AND POTASSIUM PHOSPHATE 500 ML: .53; .5; .37; .037; .03; .012; .00082 INJECTION, SOLUTION INTRAVENOUS at 13:41

## 2023-08-13 RX ADMIN — SUGAMMADEX 200 MG: 100 INJECTION, SOLUTION INTRAVENOUS at 03:01

## 2023-08-13 RX ADMIN — METRONIDAZOLE 500 MG: 500 INJECTION, SOLUTION INTRAVENOUS at 05:12

## 2023-08-13 RX ADMIN — SODIUM CHLORIDE, SODIUM GLUCONATE, SODIUM ACETATE, POTASSIUM CHLORIDE AND MAGNESIUM CHLORIDE 125 ML/HR: 526; 502; 368; 37; 30 INJECTION, SOLUTION INTRAVENOUS at 21:14

## 2023-08-13 RX ADMIN — PROPOFOL 50 MG: 10 INJECTION, EMULSION INTRAVENOUS at 00:11

## 2023-08-13 RX ADMIN — NICOTINE 1 PATCH: 14 PATCH, EXTENDED RELEASE TRANSDERMAL at 08:17

## 2023-08-13 RX ADMIN — BUPIVACAINE HYDROCHLORIDE 30 ML: 2.5 INJECTION, SOLUTION EPIDURAL; INFILTRATION; INTRACAUDAL at 03:00

## 2023-08-13 RX ADMIN — LEVALBUTEROL HYDROCHLORIDE 1.25 MG: 1.25 SOLUTION RESPIRATORY (INHALATION) at 20:58

## 2023-08-13 RX ADMIN — Medication 1 SPRAY: at 11:35

## 2023-08-13 NOTE — ANESTHESIA PROCEDURE NOTES
Peripheral Block    Patient location during procedure: holding area  Start time: 8/13/2023 3:00 AM  Reason for block: at surgeon's request and post-op pain management  Staffing  Performed by: Coleman Herrera MD  Authorized by: Coleman Herrera MD    Preanesthetic Checklist  Completed: patient identified, IV checked, site marked, risks and benefits discussed, surgical consent, monitors and equipment checked, pre-op evaluation and timeout performed  Peripheral Block  Patient position: supine  Prep: ChloraPrep  Patient monitoring: frequent blood pressure checks, continuous pulse oximetry and heart rate  Block type: TAP  Laterality: bilateral  Injection technique: single-shot  Procedures: ultrasound guided, Ultrasound guidance required for the procedure to increase accuracy and safety of medication placement and decrease risk of complications. bupivacaine (PF) (MARCAINE) 0.25 % injection 20 mL - Perineural   20 mL - 8/13/2023 3:00:00 AM  Needle  Needle type: Stimuplex   Needle length: 4 in  Needle localization: anatomical landmarks and ultrasound guidance  Assessment  Injection assessment: incremental injection, frequent aspiration, injected with ease, negative aspiration, negative for heart rate change, no paresthesia on injection, no symptoms of intraneural/intravenous injection and needle tip visualized at all times  Paresthesia pain: none  Post-procedure:  site cleaned  patient tolerated the procedure well with no immediate complications

## 2023-08-13 NOTE — OP NOTE
OPERATIVE REPORT  PATIENT NAME: Carroll Martinez    :  1960  MRN: [id number]  Pt Location: AN OR ROOM 04    SURGERY DATE: 2023    Surgeon(s) and Role:     * Severo Dimmer, DO - Primary     * Torsten Han MD - Assisting    Preop Diagnosis:  Diverticulitis of large intestine with perforation and abscess [K57.20]    Post-Op Diagnosis Codes:     * Diverticulitis of large intestine with perforation and abscess [K57.20]   Colovesical fistula  Epigastric hernia      Procedure(s):  HARTMANS PROCEDURE  LAPAROTOMY EXPLORATORY. TAKEDOWN OF COLOVESICLE FISTULA  APPENDECTOMY  CHOLECYSTECTOMY  Drainage of intra-abdominal abscess  Repair of epigastric hernia, no mesh    Specimen(s):  ID Type Source Tests Collected by Time Destination   1 : sigmoid colon  Tissue Large Intestine, Sigmoid Colon TISSUE EXAM Clesherri Amado Conron,  2023 0102    2 : appendix Tissue Appendix TISSUE EXAM Cleie Amado Conron, DO 2023 0122    3 : gallbladder  Tissue Gallbladder TISSUE EXAM Cleie Amado Conron, DO 2023 0128        Estimated Blood Loss:   Minimal    Drains:  Closed/Suction Drain Right RLQ Bulb 19 Fr. (Active)   Number of days: 0       Closed/Suction Drain Right RLQ Bulb 19 Fr. (Active)   Number of days: 0       NG/OG/Enteral Tube Nasogastric 18 Fr Right nare (Active)   Number of days: 0       Colostomy LUQ (Active)   Number of days: 0       Urethral Catheter Latex; Double-lumen 16 Fr. (Active)   Number of days: 0       [REMOVED] Closed/Suction Drain Left LLQ Bulb 19 Fr. (Removed)   Number of days: 0       Anesthesia Type:   General  ANNIKA block per anesthesia    Operative Indications:  Diverticulitis of large intestine with perforation and abscess [K57.20]  Peritoneum    Operative Findings:  Extensive inflammation of the sigmoid colon including a colovesical fistula the distal sigmoid colon. 2 lower abdominal abscess collections.   Possible appendicitis versus secondary reaction involved with the sigmoid colon prompting an appendectomy. Patient around the gallbladder with a large stone prompting a cholecystectomy. Complications:   None    Procedure and Technique:  Patient was brought to the operative suite and identified by visualization, conversation, by armband. Sequential compression pumps were placed. She was on Zosyn perioperatively. She was placed on the OR table. Once under general anesthesia Del Toro catheter was placed under sterile technique. Abdomen is then prepped and draped in a sterile fashion. Timeout was performed is assured that the prep was dry. Lower abdominal and skin incision was made coming up just above the umbilicus. Underlying subcutaneous tissue divided heart cautery and the fascia fascia was carefully opened up gaining access into the abdominal cavity. Did go through epigastric hernia just above the umbilicus. The abdomen was then explored. There was a fair amount of murky fluid throughout the abdominal cavity specially down low in the pelvis. The omentum was firmly adherent to the lower pelvis. Such with finger fractionation I was able to bring the omentum up to the wound and packed this into the upper abdomen. Is quite obvious the sigmoid colon was tremendously inflamed. All bowel was here to be lifted away with blunt dissection. Cecum was noted to be next to the sigmoid and appeared that the appendix was involved in the inflammatory reaction of the right adnexa and sigmoid colon. This was carefully freed up. Because it was so inflamed an appendectomy was performed after the sigmoid colon was removed. Cecum was initially packed into the right upper quadrant. Bookwalter retractor was placed for exposure. Small bowel was then packed into the upper abdomen. Found a soft section of the descending colon as it came into the sigmoid and this was divided with a fire of a VINEET 75 stapler. Enseal device was then used to divide the mesentery down into the sigmoid. I did stay in the mid sigmoid to assure no injury to the ureter. The entire left pelvic sidewall was incredibly inflamed. In freeing up the sigmoid a large left ovarian cyst was noted this was somewhat adherent to the pelvic sidewall and kept in place. Using finger fractionation I got around the sides of the sigmoid colon. It was densely adherent to the superior aspect of the uterus as well as the bladder. With finger fractionation this was freed up and I was able to get below the inflamed aspect of the sigmoid to the top of the rectum. Using the Enseal device we divided the lower sigmoid mesentery into the upper rectum. This allowed me to bring the sigmoid up out of the pelvis and visualize a soft portion of the upper rectum. This was divided with a contour stapling device. Sigmoid colon was then handed off the field. Corners of the rectum were then marked with 2-0 Prolene sutures. There was torsion of the superior uterus which was somewhat denuded and this was oversewn with 2-0 PDS sutures. Could certainly see an area where the colovesical fistula had been on the bladder. This was just anterior and superior to the uterus. This rent in the bladder was oversewn with 2-0 PDS in a figure-of-eight fashion x3. There is no signs of any mucosa coming out. There is no signs of any urine leaking. The sigmoid out of the way I then looked at the cecum. The appendix was taken off the cecum at its base with another fire of the contour stapler. The stapler was oversewn with 2-0 silk sutures. Descending colon was then visualized. We did free up some of the mesentery and the white line of Toldt up towards the splenic flexure. This provided ample room to bring the end of the colon out for colostomy. I then inspected the gallbladder which certainly had some inflammation around it and a large stone encompassing the majority of the lumen of the gallbladder.   For fear of developing cholecystitis in her postoperative phase, cholecystectomy was performed. Fair amount of omentum to the undersurface of the gallbladder was carefully taken away with hot cautery there was a and edematous plane. Gallbladder is then taken off the liver in a dome down fashion. Using a right angle clamp the cystic artery was initially visualized and divided between clips. Continue to carefully dissect out the neck of the gallbladder down to the distal cystic duct. This was triply clipped divided and handed off the field. There are a few bleeding points controlled with hot cautery of the liver bed. This was packed with a sponge and we then matured the colostomy. Appropriate spot on the left lower quadrant of the umbilicus was chosen and the luan wall was created using hot cautery. Slit the anterior fascia and The rectus muscle intact splitting it with a Litzy. Cruciate incision was made. He was able to put 2 fingers through the colostomy hole. Descending colon was then brought up assuring that it was not twisted upon itself. It reach with ease with ample room. Remained healthy and pink in appearance. Copious irrigation was carried out throughout the abdominal cavity. Gallbladder fossa was intact without any bleeding. After several liters of saline used for irrigation to 19 [place] drains were placed in the right side of the abdomen. The lower 1 went down in the pelvis across the cul-de-sac up into the left pelvis. The upper right drain went along the right gutter up into the gallbladder fossa. Both were anchored to the skin with 3-0 nylon. Seprafilm was then placed around the rectal stump. Small bowel is then placed into the pelvis. Omentum was then brought down over the small bowel. Another piece to separate was placed on top of the omentum. Fascia was closed using running #1 PDS in a continuous fashion.  This closure also included previously encounterd epigastric hernia.  0 Vicryl was then used to close subcutaneous tissues. Skin was then closed skin staples. Colostomy was then matured in a Sabrina fashion using 4-0 Monocryl. Edges were healthy and pink. Anesthesia then placed tap blocks. Wounds were washed and dried sterile dressing and a colostomy appliance were applied. She was extubated by anesthesia and returned to the ICU in stable yet critical condition. I was present for the entire procedure.     Patient Disposition:      SIGNATURE: Shannan Chan DO  DATE: August 13, 2023  TIME: 2:39 AM              PACU

## 2023-08-13 NOTE — UTILIZATION REVIEW
Initial Clinical Review    Admission: Date/Time/Statement:   Admission Orders (From admission, onward)     Ordered        08/12/23 2147  Inpatient Admission  Once                      Orders Placed This Encounter   Procedures   • Inpatient Admission     Standing Status:   Standing     Number of Occurrences:   1     Order Specific Question:   Level of Care     Answer:   Level 1 Stepdown [13]     Order Specific Question:   Estimated length of stay     Answer:   More than 2 Midnights     Order Specific Question:   Certification     Answer:   I certify that inpatient services are medically necessary for this patient for a duration of greater than two midnights. See H&P and MD Progress Notes for additional information about the patient's course of treatment. ED Arrival Information     Expected   -    Arrival   8/12/2023 16:09    Acuity   Emergent            Means of arrival   Walk-In    Escorted by   Spouse    Service   Surgery-General    Admission type   Emergency            Arrival complaint   Abdominal Pain           Chief Complaint   Patient presents with   • Abdominal Pain     Pt presenting w/ RLQ abdominal pain since last night. Hx of hernia, pcp sent pt to see a general surgeon. Pt denies N/V/D. Initial Presentation: 70 y.o. female from home to ED admitted inpatient due to acute abdomen with perforated diverticulitis and colovesical fistula. Presented due to lower abdominal pain, distention and constipation starting about 24 hours prior to arrival.   For last 6 months vague abdominal discomfort. On exam abdomen diffusely tender with guarding, distended. Mg 1.7.  UA large leukocytes. Procalcitonin 14.61. Lactic acid 3. INR 1.23. Wbc 17.72.   K 3.3. Bun 27,creatinine 1.96. Total bilirubin 2.30. Ct abdomen acute sigmoid diverticulitis with perforation causing multiple phlegmonous fluid collections and pneumoperitoneum. Colovesical fistula with diffuse bladder wall thickening . Gallstones.   In the ED given 2 liters IVF, Dilaudid, started on antibiotics. Plan is surgical intervention. NPO, IVF, IV antibiotics. Consult critical care. Date: 8/13/23    Day 2: Operative day. Pain controlled. No ostomy functioning. MAP 65 - 75 off pressors. On exam:  Abdomen: Soft, appropriately tender, nondistended; incisions cleanly dressed, NG tube in place - 200 cc.  HENRI drains in place  ostomy pink and viable with sweat in the bag. Continue NPO , ngt. Monitor ostomy output. Wean oxygen as able. Continue IVF. Pain control with Dilaudid PCA. Antibiotics. 8/13/23 per Critical care - patient with Perforated diverticulum/HPT. Admit to ICU post op s/p emergent ex lap - planning for resection of perforation, cholecystectomy, and likely creation of Tri's. On exam: tachycardic. Abdomen rigid. Generalized tenderness and guarding. Toxic appearing. Tachypnea. Decreased breath sounds. Procedure 8/13/23 HARTMANS PROCEDURE  LAPAROTOMY EXPLORATORY. TAKEDOWN OF COLOVESICLE FISTULA  APPENDECTOMY  CHOLECYSTECTOMY  Drainage of intra-abdominal abscess  Repair of epigastric hernia, no mesh  Findings Extensive inflammation of the sigmoid colon including a colovesical fistula the distal sigmoid colon. 2 lower abdominal abscess collections. Possible appendicitis versus secondary reaction involved with the sigmoid colon prompting an appendectomy. Patient around the gallbladder with a large stone prompting a cholecystectomy.       ED Triage Vitals [08/12/23 1714]   Temperature Pulse Respirations Blood Pressure SpO2   97.6 °F (36.4 °C) (!) 109 18 103/65 97 %      Temp Source Heart Rate Source Patient Position - Orthostatic VS BP Location FiO2 (%)   Oral Monitor Sitting Right arm --      Pain Score       10 - Worst Possible Pain          Wt Readings from Last 1 Encounters:   08/13/23 63.4 kg (139 lb 12.4 oz)     Additional Vital Signs:   08/13/23 1300 -- 98 14 106/56 75 93 % 44 -- 6 L/min Nasal cannula Lying 08/13/23 1200 -- 107 Abnormal  34 Abnormal  124/68 90 89 % Abnormal  44 -- 6 L/min Nasal cannula Lying   08/13/23 1108 98.1 °F (36.7 °C) 104 19 110/61 80 93 % -- -- -- -- Lying   08/13/23 1100 -- 100 17 124/65 88 94 % 44 -- 6 L/min Nasal cannula Lying   08/13/23 1000 -- 104 31 Abnormal  115/68 87 90 % -- -- -- -- --   08/13/23 0900 -- 101 18 102/57 74 94 % -- -- -- -- --   08/13/23 0800 -- 100 19 105/59 76 94 % -- -- -- -- --   08/13/23 0730 98.1 °F (36.7 °C) 97 21 102/55 73 94 % -- -- -- -- Lying   08/13/23 0700 -- 99 27 Abnormal  106/60 78 94 % -- -- -- -- --   08/13/23 0600 -- 93 17 97/56 74 95 % -- -- -- -- --   08/13/23 0500 -- 93 17 97/52 69 93 % -- -- -- -- --   08/13/23 0400 -- 95 19 99/56 75 96 % -- -- -- -- --   08/13/23 0325 98.6 °F (37 °C) 100 20 84/52 Abnormal  63 Abnormal  -- -- -- -- -- --   08/13/23 0251 -- -- -- -- -- 96 % -- 6 L/min -- Simple mask --   08/12/23 2300 -- 97 22 91/54 66 96 % -- -- -- -- --   08/12/23 2200 -- 109 Abnormal  22 107/68 82 97 % 36 -- 4 L/min Nasal cannula Sitting   08/12/23 2145 98 °F (36.7 °C) 114 Abnormal  22 122/69 88 96 % 36 -- 4 L/min Nasal cannula Sitting   08/12/23 2130 -- 112 Abnormal  22 104/57 -- 86 % Abnormal    -- -- -- None (Room air) Sitting   SpO2: 86% on RA while at rest - placed on 4L NC 02 and sp02 increased to 94% at 08/12/23 2130   08/12/23 2115 97.9 °F (36.6 °C) 113 Abnormal  22 110/70 -- 92 % -- -- -- None (Room air) Sitting   08/12/23 2100 -- 109 Abnormal  22 107/75 86 95 % -- -- -- None (Room air) Sitting   08/12/23 2000 -- 113 Abnormal  24 Abnormal  100/52 72 95 % -- -- -- None (Room air      Pertinent Labs/Diagnostic Test Results:   CT abdomen pelvis with contrast   Final Result by Meghan Walker MD (08/12 2116)      1. Findings of acute sigmoid diverticulitis with perforation causing multiple phlegmonous fluid collections and pneumoperitoneum. Colovesical fistula with diffuse bladder wall thickening .    Diffuse small bowel wall thickening in the pelvis secondary to adjacent diverticulitis causing an ileus pattern. 2. Large gallstones in the gallbladder. 3. Large simple hepatic cyst posteriorly measuring 13 x 10 cm. I personally discussed this study with Dr. Obey Caraballo on 8/12/2023 9:12 PM.            Workstation performed: [id number]         XR chest 1 view portable    (Results Pending)     8/12/23 ecg Previous ECG:  Unavailable    Comparison to cardiac monitor: Yes    Rate:     ECG rate:  108    ECG rate assessment: tachycardic    Rhythm:     Rhythm: sinus tachycardia    Ectopy:     Ectopy: PVCs      PVCs:  Infrequent  QRS:     QRS axis:  Normal    QRS intervals:  Normal  T waves:     T waves: inverted      Inverted:  I, aVL and V2  Comments:      No STEMI.    QT/QTc 384/514    Results from last 7 days   Lab Units 08/13/23  0351 08/12/23  2149 08/12/23  1722   WBC Thousand/uL 13.26*  --  17.72*   HEMOGLOBIN g/dL 10.4*  --  13.2   HEMATOCRIT % 32.6*  --  40.7   PLATELETS Thousands/uL 336 347 473*   BANDS PCT %  --   --  20*     Results from last 7 days   Lab Units 08/13/23  0351 08/12/23  1722   SODIUM mmol/L 140 138   POTASSIUM mmol/L 3.2* 3.3*   CHLORIDE mmol/L 111* 102   CO2 mmol/L 19* 23   ANION GAP mmol/L 10 13   BUN mg/dL 26* 27*   CREATININE mg/dL 0.88 1.06   EGFR ml/min/1.73sq m 69 55   CALCIUM mg/dL 7.1* 9.1   CALCIUM, IONIZED mmol/L 0.99*  --    MAGNESIUM mg/dL 1.7* 1.7*   PHOSPHORUS mg/dL 5.6*  --      Results from last 7 days   Lab Units 08/13/23  0351 08/12/23  1722   AST U/L 37 10*   ALT U/L 14 7   ALK PHOS U/L 106* 186*   TOTAL PROTEIN g/dL 5.3* 7.2   ALBUMIN g/dL 3.0* 3.5   TOTAL BILIRUBIN mg/dL 1.80* 2.30*   BILIRUBIN DIRECT mg/dL 1.30*  --      Results from last 7 days   Lab Units 08/13/23  1324   POC GLUCOSE mg/dl 112     Results from last 7 days   Lab Units 08/13/23  0351 08/12/23  1722   GLUCOSE RANDOM mg/dL 132 113     Results from last 7 days   Lab Units 08/13/23  0351   PH GIDEON  7.248*   PCO2 GIDEON mm Hg 46.6   PO2 GIDEON mm Hg 53.2*   HCO3 GIDEON mmol/L 19.9*   BASE EXC GIDEON mmol/L -7.3   O2 CONTENT GIDEON ml/dL 14.3   O2 HGB, VENOUS % 82.1*     Results from last 7 days   Lab Units 08/12/23 2150 08/12/23 1951   HS TNI 0HR ng/L  --  5   HS TNI 2HR ng/L 5  --    HSTNI D2 ng/L 0  --      Results from last 7 days   Lab Units 08/12/23 1951   PROTIME seconds 16.2*   INR  1.23*   PTT seconds 31     Results from last 7 days   Lab Units 08/13/23 0351 08/12/23 1722   PROCALCITONIN ng/ml 20.26* 14.61*     Results from last 7 days   Lab Units 08/13/23 0351 08/12/23 2150 08/12/23 1951   LACTIC ACID mmol/L 1.0 2.4* 3.0*     Results from last 7 days   Lab Units 08/12/23 1722   LIPASE u/L <6*     Results from last 7 days   Lab Units 08/12/23 2123   CLARITY UA  Turbid   COLOR UA  Yellow   SPEC GRAV UA  1.025   PH UA  6.0   GLUCOSE UA mg/dl Negative   KETONES UA mg/dl Negative   BLOOD UA  Moderate*   PROTEIN UA mg/dl Trace*   NITRITE UA  Negative   BILIRUBIN UA  Negative   UROBILINOGEN UA (BE) mg/dl 2.0*   LEUKOCYTES UA  Large*   WBC UA /hpf Innumerable*   RBC UA /hpf 1-2   BACTERIA UA /hpf Occasional   EPITHELIAL CELLS WET PREP /hpf Occasional   MUCUS THREADS  Occasional*     Results from last 7 days   Lab Units 08/12/23 2028 08/12/23 1951   BLOOD CULTURE  Received in Microbiology Lab. Culture in Progress. Received in Microbiology Lab. Culture in Progress.        ED Treatment:   Medication Administration from 08/12/2023 1609 to 08/12/2023 2231       Date/Time Order Dose Route Action Comments     08/12/2023 2004 EDT sodium chloride 0.9 % bolus 1,000 mL 1,000 mL Intravenous New Bag --     08/12/2023 2025 EDT HYDROmorphone (DILAUDID) injection 0.5 mg 0.5 mg Intravenous Given --     08/12/2023 2003 EDT sodium chloride 0.9 % bolus 1,000 mL 1,000 mL Intravenous New Bag --     08/12/2023 2103 EDT cefepime (MAXIPIME) 2 g/50 mL dextrose IVPB 2,000 mg Intravenous New Bag --     08/12/2023 2126 EDT metroNIDAZOLE (FLAGYL) IVPB (premix) 500 mg 100 mL 500 mg Intravenous New Bag --     08/12/2023 2156 EDT multi-electrolyte (PLASMALYTE-A/ISOLYTE-S PH 7.4) IV solution 125 mL/hr Intravenous New Bag --     08/12/2023 2207 EDT piperacillin-tazobactam (ZOSYN) 3.375 g in sodium chloride 0.9 % 100 mL IVPB 3.375 g Intravenous New Bag --        Past Medical History:   Diagnosis Date   • Hypertension      Present on Admission:  • Hypertension      Admitting Diagnosis: Pneumoperitoneum [K66.8]  Hepatic cyst [K76.89]  Cholelithiasis [K80.20]  Abdominal pain [R10.9]  Colovesical fistula [N32.1]  Severe sepsis ([address]) [A41.9, R65.20]  Diverticulitis of large intestine with perforation and abscess [K57.20]  Age/Sex: 70 y.o. female  Admission Orders:  Scheduled Medications:  chlorhexidine, 15 mL, Mouth/Throat, Q12H [address]  enoxaparin, 30 mg, Subcutaneous, Q12H [address]  multi-electrolyte, 500 mL, Intravenous, Once  nicotine, 1 patch, Transdermal, Daily  pantoprazole, 40 mg, Intravenous, Q24H [address]  piperacillin-tazobactam, 3.375 g, Intravenous, Q6H    albumin human (FLEXBUMIN) 5 % injection 25 g  Dose: 25 g  Freq: Once Route: IV  Last Dose: Stopped (08/13/23 0000)  Start: 08/12/23 2315 End: 08/13/23 0000    calcium gluconate 2 g in sodium chloride 0.9% 100 mL (premix)  Dose: 2 g  Freq: Once Route: IV  Last Dose: Stopped (08/13/23 7151)  Start: 08/13/23 0645 End: 08/13/23 3527    magnesium sulfate IVPB (premix) SOLN 1 g  Dose: 1 g  Freq: Once Route: IV  Last Dose: Stopped (08/13/23 1803)  Start: 08/13/23 0645 End: 08/13/23 0838    metroNIDAZOLE (FLAGYL) IVPB (premix) 500 mg 100 mL  Dose: 500 mg  Freq: Every 8 hours Route: IV  Last Dose: Stopped (08/13/23 0629)  Start: 08/12/23 2030 End: 08/13/23 0756    multi-electrolyte (ISOLYTE-S PH 7.4) bolus 500 mL  Dose: 500 mL  Freq:  Once Route: IV  Start: 08/13/23 1345      Continuous IV Infusions:  HYDROmorphone, , Intravenous, Continuous  multi-electrolyte, 125 mL/hr, Intravenous, Continuous    PRN Meds:  naloxone, 0.1 mg, Intravenous, Q3 min PRN  ondansetron, 4 mg, Intravenous, Q6H PRN  phenol, 1 spray, Mouth/Throat, Q2H PRN x 1     Cardio pulmonary monitoring  ngt to LIS  NPO    Network Utilization Review Department  ATTENTION: Please call with any questions or concerns to [phone] and carefully listen to the prompts so that you are directed to the right person. All voicemails are confidential.  Natalee Hallhumera all requests for admission clinical reviews, approved or denied determinations and any other requests to dedicated fax number below belonging to the campus where the patient is receiving treatment.  List of dedicated fax numbers for the Facilities:  [place] DENIALS (Administrative/Medical Necessity) [phone] [facility] [facility] [phone]   [address] [place] [address] [phone]   [facility] [address] [facility] [phone] 1505 [address] [place] [address] [facility] [phone]   [address] [address] [facility] [phone]

## 2023-08-13 NOTE — ANESTHESIA PROCEDURE NOTES
Peripheral Block    Patient location during procedure: holding area  Reason for block: at surgeon's request and post-op pain management  Staffing  Performed by: Kirby Valdez MD  Authorized by: Kirby Valdez MD    Preanesthetic Checklist  Completed: patient identified, IV checked, site marked, risks and benefits discussed, surgical consent, monitors and equipment checked, pre-op evaluation and timeout performed  Peripheral Block  Patient position: supine  Prep: ChloraPrep  Patient monitoring: frequent blood pressure checks, continuous pulse oximetry and heart rate  Block type: Rectus Sheath  Laterality: bilateral  Injection technique: single-shot  Procedures: ultrasound guided, Ultrasound guidance required for the procedure to increase accuracy and safety of medication placement and decrease risk of complications. bupivacaine (PF) (MARCAINE) 0.25 % injection 20 mL - Perineural   30 mL - 8/13/2023 3:00:00 AM  Needle  Needle type: Stimuplex   Needle length: 4 in  Needle localization: anatomical landmarks and ultrasound guidance  Assessment  Injection assessment: incremental injection, frequent aspiration, injected with ease, negative aspiration, negative for heart rate change, no paresthesia on injection, no symptoms of intraneural/intravenous injection and needle tip visualized at all times  Paresthesia pain: none  Post-procedure:  site cleaned  patient tolerated the procedure well with no immediate complications

## 2023-08-13 NOTE — ASSESSMENT & PLAN NOTE
· Currently holding PTA antihypertensives given possibility of hemodynamic compromise with perforated bowel

## 2023-08-13 NOTE — ANESTHESIA PREPROCEDURE EVALUATION
Procedure:  HARTMANS PROCEDURE (Hip)    Relevant Problems   CARDIO   (+) Hemorrhoids   (+) Hypertension      Other   (+) Abdominal wall hernia   (+) Asymptomatic menopause   (+) Bilateral groin pain   (+) Constipation   (+) Other fatigue   (+) Painful urination   (+) Pelvic pain   (+) Urinary frequency   (+) Weight loss      Lab Results   Component Value Date    SODIUM 138 08/12/2023    K 3.3 (L) 08/12/2023     08/12/2023    CO2 23 08/12/2023    AGAP 13 08/12/2023    BUN 27 (H) 08/12/2023    CREATININE 1.06 08/12/2023    GLUC 113 08/12/2023    GLUF 75 06/03/2023    CALCIUM 9.1 08/12/2023    AST 10 (L) 08/12/2023    ALT 7 08/12/2023    ALKPHOS 186 (H) 08/12/2023    TP 7.2 08/12/2023    TBILI 2.30 (H) 08/12/2023    EGFR 55 08/12/2023     Lab Results   Component Value Date    WBC 17.72 (H) 08/12/2023    HGB 13.2 08/12/2023    HCT 40.7 08/12/2023    MCV 78 (L) 08/12/2023     (H) 08/12/2023         Physical Exam    Airway    Mallampati score: II  TM Distance: <3 FB  Neck ROM: full     Dental   Comment: Poor dentition, multiple loose teeth,     Cardiovascular      Pulmonary      Other Findings        Anesthesia Plan  ASA Score- 3 Emergent    Anesthesia Type- general with ASA Monitors. Additional Monitors:   Airway Plan: ETT. Plan Factors-Exercise tolerance (METS): <4 METS. Chart reviewed. EKG reviewed. Imaging results reviewed. Existing labs reviewed. Patient summary reviewed. Induction- intravenous. Postoperative Plan- Plan for postoperative opioid use. Planned trial extubation    Informed Consent- Anesthetic plan and risks discussed with patient. I personally reviewed this patient with the CRNA. Discussed and agreed on the Anesthesia Plan with the CRNA. Jessica Brooms

## 2023-08-13 NOTE — H&P
H&P: General surgery  Nirmala Edmonds 70 y.o. female MRN: [id number]  Unit/Bed#: TON Encounter: [id number]        Assessment/Plan     Assessment:  Patient is a 70 y.o. female with pmhx of hypertension and smoking history who presented with perforated diverticulitis colovesicular fistula. Afebrile, tachycardic to the 110s  WBC: 17; Hb.2; lactic acid: 3  CTAP showed Findings of acute sigmoid diverticulitis with perforation causing multiple phlegmonous fluid collections and pneumoperitoneum. Colovesical fistula with diffuse bladder wall thickening. Diffuse small bowel wall thickening in the pelvis secondary to adjacent diverticulitis causing an ileus pattern. Large gallstones in the gallbladder. Large simple hepatic cyst posteriorly measuring 13 x 10 cm. Plan:  -Admit to general surgery   -Critical Care consult  -OR for Ness's  -NPO/IVF  -IV antibiotics; Zosyn  -DVT prophylaxis    History of Present Illness     HPI:  Nirmala Edmonds is a 70 y.o. female with pmhx of hypertension and a significant smoking history who presents 24 hours of acute onset progressive abdominal pain which started in the right lower quadrant. She denies associated fevers, chills, nausea, vomiting chest pain, shortness of breath. She reports a long history of constipation and that for the past approximately 6 months she has had vague lower abdominal pain as well as recurrent UTIs and pneumaturia. She denies having had any previous colonoscopies. She denies any previous abdominal surgeries. She denies anticoagulation and antitherapy. Review of Systems   As above, otherwise negative  Consults    Historical Information   Past Medical History:   Diagnosis Date   • Hypertension      No past surgical history on file.   Social History   Social History     Substance and Sexual Activity   Alcohol Use Never     Social History     Substance and Sexual Activity   Drug Use Never     Social History     Tobacco Use   Smoking Status Every Day   • Packs/day: 1.00   • Types: Cigarettes   Smokeless Tobacco Never     Family History: non-contributory    Meds/Allergies   all medications and allergies reviewed  Allergies   Allergen Reactions   • Acetaminophen Hives   • Penicillins Hives       Objective   First Vitals:   Blood Pressure: 103/65 (08/12/23 1714)  Pulse: (!) 109 (08/12/23 1714)  Temperature: 97.6 °F (36.4 °C) (08/12/23 1714)  Temp Source: Oral (08/12/23 1714)  Respirations: 18 (08/12/23 1714)  Height: 4' 9" (144.8 cm) (08/12/23 2145)  Weight - Scale: 59 kg (130 lb 1.1 oz) (08/12/23 2145)  SpO2: 97 % (08/12/23 1714)    Current Vitals:   Blood Pressure: 107/68 (08/12/23 2200)  Pulse: (!) 109 (08/12/23 2200)  Temperature: 98 °F (36.7 °C) (08/12/23 2145)  Temp Source: Oral (08/12/23 2145)  Respirations: 22 (08/12/23 2200)  Height: 4' 9" (144.8 cm) (08/12/23 2145)  Weight - Scale: 59 kg (130 lb 1.1 oz) (08/12/23 2145)  SpO2: 97 % (08/12/23 2200)      Intake/Output Summary (Last 24 hours) at 8/12/2023 2219  Last data filed at 8/12/2023 2150  Gross per 24 hour   Intake 2120 ml   Output --   Net 2120 ml       Invasive Devices     Peripheral Intravenous Line  Duration           Peripheral IV 08/12/23 Left;Proximal;Ventral (anterior) Forearm <1 day    Peripheral IV 08/12/23 Proximal;Right;Ventral (anterior) Antecubital <1 day                Physical Exam  General: Ill-appearing  Skin: Diaphoretic  HEENT: Normocephalic, atraumatic  CV: RRR  Pulm:  Nonlabored breathing on room air  Abd: Firm, diffusely tender with guarding, immediately distended  MSK: Symmetric, no edema, no tenderness, no deformity  Neuro: AOx3, GCS 15    Lab Results: I have personally reviewed pertinent lab results. Imaging: I have personally reviewed pertinent reports. and I have personally reviewed pertinent films in PACS  EKG, Pathology, and Other Studies: I have personally reviewed pertinent reports.       Code Status: Level 1 - Full Code  Advance Directive and Living Will: Power of :    POLST:

## 2023-08-13 NOTE — SEPSIS NOTE
Sepsis Note   Azalia Corbin 70 y.o. female MRN: [id number]  Unit/Bed#: ICU 09 Encounter: [id number]       Initial Sepsis Screening     [address] Name 08/12/23 1948                Is the patient's history suggestive of a new or worsening infection? Yes (Proceed)  -        Suspected source of infection acute abdominal infection  -LH        Indicate SIRS criteria Tachycardia > 90 bpm;Tachypnea > 20 resp per min;Leukocytosis (WBC > 16910 IJL) OR Leukopenia (WBC <4000 IJL) OR Bandemia (WBC >10% bands)  -LH        Are two or more of the above signs & symptoms of infection both present and new to the patient? Yes (Proceed)  -        Assess for evidence of organ dysfunction: Are any of the below criteria present within 6 hours of suspected infection and SIRS criteria that are NOT considered to be chronic conditions? Bilirubin > 2. 0;Lactate > 2.0  -        Date of presentation of severe sepsis --        Time of presentation of severe sepsis --        Sepsis Note: Click "NEXT" below (NOT "close") to generate sepsis note based on above information. YES (proceed by clicking "NEXT")  -[address]  (r) = Recorded By, (t) = Taken By, (c) = Cosigned By    [facility] Name Provider Type     Linda Hargrove PA-C Physician Assistant                    Body mass index is 30.25 kg/m².   Wt Readings from Last 1 Encounters:   08/13/23 63.4 kg (139 lb 12.4 oz)     IBW (Ideal Body Weight): 38.6 kg    Ideal body weight: 45.5 kg (100 lb 5.7 oz)  Adjusted ideal body weight: 52.7 kg (116 lb 2 oz)

## 2023-08-13 NOTE — ANESTHESIA POSTPROCEDURE EVALUATION
Post-Op Assessment Note    CV Status:  Stable    Pain management: adequate     Mental Status:  Sleepy and arousable   Hydration Status:  Euvolemic   PONV Controlled:  Controlled   Airway Patency:  Patent      Post Op Vitals Reviewed: Yes and Patient transferred to icu on O2 and monitors.  Report given to icu team      Staff: CRNA         No notable events documented./    BP   84/52   Temp   98   Pulse  95   Resp   16   SpO2   98

## 2023-08-13 NOTE — PROGRESS NOTES
General Surgery  Progress Note   Mauro Granados 70 y.o. female MRN: [id number]  Unit/Bed#: ICU 09 Encounter: [id number]    Assessment:  70-year-old female who presented with perforated diverticulitis, colovesicular and colonuterine fistula now status post Ness's, fistula takedown, appendectomy, cholecystectomy, and umbilical hernia repair. Afebrile, VSS with maps ranging from 65-75 off pressors  WBC: 13 from 18 point; Hbg: 10 from 13; 0.88 from 1    UOP: 475 cc  NGT: 200 cc dark bilious    Plan:  - NGT/NPO  - IVF  - Continue IV antibiotics; Zosyn  -Ostomy; monitor output and character  -Wound care consultation for ostomy teaching  - DVT prophylaxis  - And downgrade to stepdown to today  - Appreciate critical care recommendations      Subjective/Objective     Subjective: Patient seen and examined at bedside, in no acute distress. No acute events overnight. Patient's pain is well controlled. Pt denies nausea or vomiting. No colostomy function    Objective:     Vitals:Blood pressure 105/59, pulse 100, temperature 98.1 °F (36.7 °C), temperature source Axillary, resp. rate 19, height 4' 9" (1.448 m), weight 63 kg (138 lb 14.2 oz), SpO2 94 %. ,Body mass index is 30.06 kg/m².      Temp (24hrs), Av.9 °F (36.6 °C), Min:97.4 °F (36.3 °C), Max:98.6 °F (37 °C)  Current: Temperature: 98.1 °F (36.7 °C)      Intake/Output Summary (Last 24 hours) at 2023 0835  Last data filed at 2023 0800  Gross per 24 hour   Intake 6230.42 ml   Output 885 ml   Net 5345.42 ml       Invasive Devices     Peripheral Intravenous Line  Duration           Peripheral IV 23 Left;Proximal;Ventral (anterior) Forearm <1 day    Peripheral IV 23 Proximal;Right;Ventral (anterior) Antecubital <1 day    Peripheral IV 23 Right;Dorsal (posterior) Hand <1 day          Drain  Duration           Closed/Suction Drain Right RLQ Bulb 19 Fr. <1 day    Closed/Suction Drain Right RLQ Bulb 19 Fr. <1 day    Colostomy LUQ <1 day NG/OG/Enteral Tube Nasogastric 18 Fr Right nare <1 day    Urethral Catheter Latex; Double-lumen 16 Fr. <1 day                Physical Exam:  General: No acute distress, alert and oriented  CV: Well perfused, regular rate and rhythm  Lungs: Normal work of breathing, no increased respiratory effort  Abdomen: Soft, appropriately tender, nondistended; incisions cleanly dressed, NG tube in place as above, HENRI drains in place as above, ostomy pink and viable with sweat in the bag  Extremities: No edema, clubbing or cyanosis  Skin: Warm, dry    Lab Results: Results: I have personally reviewed all pertinent laboratory/tests results  VTE Prophylaxis: Sequential compression device (Venodyne)  and Heparin    Jalil Sanchez MD  8/13/2023

## 2023-08-13 NOTE — NURSING NOTE
Attempted to assist patient OOB to chair at 1600 with 2 people. Patient's knees buckled multiple times upon standing and patient assisted back into bed. Patient placed into chair position in bed at [id number] [facility]. Patient comfortable at this time.

## 2023-08-13 NOTE — CONSULTS
[facility]  Consult  Name: Weston Doyle 70 y.o. female I MRN: [id number]  Unit/Bed#: OR POOL I Date of Admission: 8/12/2023   Date of Service: 8/13/2023 I Hospital Day: 1    Consults    Assessment/Plan   * Perforated diverticulum  Assessment & Plan  · Pt presented to ED with complaints of 10/10 progressive abdominal pain, with associated nausea, vomiting, fevers, chills, and SOB x1 day. · Imaging in the ED revealed perforated sigmoid diverticulitis with free air, large gallstones, and colovesicular fistula  · General Surgery consulted - input appreciated - pt taken emergently to the OR for ex lap - planning for resection of perforation, cholecystectomy, and likely creation of Tri's   · Admit to ICU post operatively for continued care     Hypertension  Assessment & Plan  · Currently holding PTA antihypertensives given possibility of hemodynamic compromise with perforated bowel            History of Present Illness     HPI: Weston Doyle is a 70 y.o. with PMH significant for HTN, and tobacco abuse who presents to the ED with complaints of 10/10 progressive abdominal pain with associated N/V, chest pain, fevers, chills and SOB x 1 day. Work up in ED revealed perforated sigmoid diverticulitis. Pt heading to OR for emergent ex lap with general surgery. History obtained from chart review and the patient. Review of Systems   Constitutional: Positive for chills and fever. HENT: Negative. Respiratory: Positive for shortness of breath. Gastrointestinal: Positive for abdominal distention, abdominal pain, nausea and vomiting. Genitourinary:        Air bubbles in urine stream   Musculoskeletal: Negative. Skin: Negative. Neurological: Negative. Psychiatric/Behavioral: Negative. Historical Information   Past Medical History:  No date: Hypertension No past surgical history on file.    Current Outpatient Medications   Medication Instructions   • hydrochlorothiazide (HYDRODIURIL) 25 mg tablet TAKE ONE TABLET BY MOUTH EVERY MORNING   • hydrocortisone (ANUSOL-HC) 2.5 % rectal cream Topical, 2 times daily   • hydrocortisone (ANUSOL-HC) 25 mg, Rectal, 2 times daily   • losartan (COZAAR) 50 mg, Oral, Daily    Allergies   Allergen Reactions   • Acetaminophen Hives   • Penicillins Hives      Social History     Tobacco Use   • Smoking status: Every Day     Packs/day: 1.00     Types: Cigarettes   • Smokeless tobacco: Never   Vaping Use   • Vaping Use: Never used   Substance Use Topics   • Alcohol use: Never   • Drug use: Never    History reviewed. No pertinent family history. Objective                            Vitals I/O      Most Recent Min/Max in 24hrs   Temp 98 °F (36.7 °C) Temp  Min: 97.4 °F (36.3 °C)  Max: 98 °F (36.7 °C)   Pulse 97 Pulse  Min: 65  Max: 114   Resp 22 Resp  Min: 18  Max: 24   BP 91/54 BP  Min: 91/54  Max: 127/67   O2 Sat 96 % SpO2  Min: 86 %  Max: 100 %      Intake/Output Summary (Last 24 hours) at 8/13/2023 0304  Last data filed at 8/13/2023 6478  Gross per 24 hour   Intake 4370 ml   Output 350 ml   Net 4020 ml         Diet NPO     Invasive Monitoring Physical exam    Physical Exam  Eyes:      Conjunctiva/sclera: Conjunctivae normal.      Pupils: Pupils are equal, round, and reactive to light. Skin:     General: Skin is warm and moist.   HENT:      Head: Normocephalic and atraumatic. Mouth/Throat:      Mouth: Mucous membranes are moist.   Cardiovascular:      Rate and Rhythm: Regular rhythm. Tachycardia present. Musculoskeletal:         General: No deformity or signs of injury. Normal range of motion. Cervical back: Neck supple. Abdominal:      General: There is distension. Palpations: Abdomen is rigid. Tenderness: There is generalized abdominal tenderness. There is guarding. Constitutional:       General: She is awake. She is not in acute distress.      Appearance: She is ill-appearing, toxic-appearing and diaphoretic. Pulmonary:      Effort: Pulmonary effort is normal. Tachypnea present. Breath sounds: Decreased breath sounds present. Psychiatric:         Behavior: Behavior is cooperative. Neurological:      General: No focal deficit present. Mental Status: She is alert, oriented to person, place, and time and oriented to person, place and time. Mental status is at baseline. GCS: GCS eye subscore is 4. GCS verbal subscore is 5. GCS motor subscore is 6. Vitals and nursing note reviewed. Diagnostic Studies    EKG: Telemetry reviewed. Imaging: I have personally reviewed pertinent reports.    and I have personally reviewed pertinent films in PACS     Medications:  Scheduled PRN   [MAR Hold] heparin (porcine), 5,000 Units, Q8H [facility]  [MAR Hold] metroNIDAZOLE, 500 mg, Q8H  [MAR Hold] nicotine, 1 patch, Daily  [MAR Hold] piperacillin-tazobactam, 3.375 g, Q6H      naloxone, 0.1 mg, Q3 min PRN  [MAR Hold] ondansetron, 4 mg, Q6H PRN  sodium chloride, , PRN  sterile water, , PRN       Continuous    HYDROmorphone,   multi-electrolyte, 125 mL/hr, Last Rate: Stopped (08/13/23 0205)         Labs:    CBC    Recent Labs     08/12/23  1722 08/12/23  2149   WBC 17.72*  --    HGB 13.2  --    HCT 40.7  --    * 347   BANDSPCT 20*  --      BMP    Recent Labs     08/12/23  1722   SODIUM 138   K 3.3*      CO2 23   AGAP 13   BUN 27*   CREATININE 1.06   CALCIUM 9.1       Coags    Recent Labs     08/12/23  1951   INR 1.23*   PTT 31        Additional Electrolytes  Recent Labs     08/12/23  1722   MG 1.7*          Blood Gas    No recent results  No recent results LFTs  Recent Labs     08/12/23  1722   ALT 7   AST 10*   ALKPHOS 186*   ALB 3.5   TBILI 2.30*       Infectious  Recent Labs     08/12/23  1722   PROCALCITONI 14.61*     Glucose  Recent Labs     08/12/23  1722   GLUC 113             Critical Care Time Delivered: Upon my evaluation, this patient had a high probability of imminent or life-threatening deterioration due to perforated viscus, which required my direct attention, intervention, and personal management. I have personally provided 25 minutes of critical care time, exclusive of procedures, teaching, family meetings, and any prior time recorded by providers other than myself.    Abhijit Zaman PA-C

## 2023-08-13 NOTE — PLAN OF CARE
Problem: Potential for Falls  Goal: Patient will remain free of falls  Description: INTERVENTIONS:  - Educate patient/family on patient safety including physical limitations  - Instruct patient to call for assistance with activity   - Consult OT/PT to assist with strengthening/mobility   - Keep Call bell within reach  - Keep bed low and locked with side rails adjusted as appropriate  - Keep care items and personal belongings within reach  - Initiate and maintain comfort rounds  - Make Fall Risk Sign visible to staff  - Initiate/Maintain bed alarm  - Apply yellow socks and bracelet for high fall risk patients  - Consider moving patient to room near nurses station  Outcome: Progressing     Problem: MOBILITY - ADULT  Goal: Maintain or return to baseline ADL function  Description: INTERVENTIONS:  -  Assess patient's ability to carry out ADLs; assess patient's baseline for ADL function and identify physical deficits which impact ability to perform ADLs (bathing, care of mouth/teeth, toileting, grooming, dressing, etc.)  - Assess/evaluate cause of self-care deficits   - Assess range of motion  - Assess patient's mobility; develop plan if impaired  - Assess patient's need for assistive devices and provide as appropriate  - Encourage maximum independence but intervene and supervise when necessary  - Involve family in performance of ADLs  - Assess for home care needs following discharge   - Consider OT consult to assist with ADL evaluation and planning for discharge  - Provide patient education as appropriate  Outcome: Progressing     Problem: Prexisting or High Potential for Compromised Skin Integrity  Goal: Skin integrity is maintained or improved  Description: INTERVENTIONS:  - Identify patients at risk for skin breakdown  - Assess and monitor skin integrity  - Assess and monitor nutrition and hydration status  - Monitor labs   - Assess for incontinence   - Turn and reposition patient  - Assist with mobility/ambulation  - Relieve pressure over bony prominences  - Avoid friction and shearing  - Provide appropriate hygiene as needed including keeping skin clean and dry  - Evaluate need for skin moisturizer/barrier cream  - Collaborate with interdisciplinary team   - Patient/family teaching  - Consider wound care consult   Outcome: Progressing     Problem: GASTROINTESTINAL - ADULT  Goal: Minimal or absence of nausea and/or vomiting  Description: INTERVENTIONS:  - Administer IV fluids if ordered to ensure adequate hydration  - Maintain NPO status until nausea and vomiting are resolved  - Nasogastric tube if ordered  - Administer ordered antiemetic medications as needed  - Provide nonpharmacologic comfort measures as appropriate  - Advance diet as tolerated, if ordered  - Consider nutrition services referral to assist patient with adequate nutrition and appropriate food choices  Outcome: Progressing     Problem: GASTROINTESTINAL - ADULT  Goal: Establish and maintain optimal ostomy function  Description: INTERVENTIONS:  - Assess bowel function  - Encourage oral fluids to ensure adequate hydration  - Administer IV fluids if ordered to ensure adequate hydration   - Administer ordered medications as needed  - Encourage mobilization and activity  - Nutrition services referral to assist patient with appropriate food choices  - Assess stoma site  - Consider wound care consult   Outcome: Progressing     Problem: GASTROINTESTINAL - ADULT  Goal: Maintains adequate nutritional intake  Description: INTERVENTIONS:  - Monitor percentage of each meal consumed  - Identify factors contributing to decreased intake, treat as appropriate  - Assist with meals as needed  - Monitor I&O, weight, and lab values if indicated  - Obtain nutrition services referral as needed  Outcome: Progressing     Problem: GASTROINTESTINAL - ADULT  Goal: Oral mucous membranes remain intact  Description: INTERVENTIONS  - Assess oral mucosa and hygiene practices  - Implement preventative oral hygiene regimen  - Implement oral medicated treatments as ordered  - Initiate Nutrition services referral as needed  Outcome: Progressing     Problem: GENITOURINARY - ADULT  Goal: Urinary catheter remains patent  Description: INTERVENTIONS:  - Assess patency of urinary catheter  - If patient has a chronic murray, consider changing catheter if non-functioning  - Follow guidelines for intermittent irrigation of non-functioning urinary catheter  Outcome: Progressing     Problem: SKIN/TISSUE INTEGRITY - ADULT  Goal: Skin Integrity remains intact(Skin Breakdown Prevention)  Description: Assess:  -Perform Alexis assessment every shift  -Clean and moisturize skin every day  -Inspect skin when repositioning, toileting, and assisting with ADLS  -Assess extremities for adequate circulation and sensation     Bed Management:  -Have minimal linens on bed & keep smooth, unwrinkled  -Change linens as needed when moist or perspiring  -Avoid sitting or lying in one position for more than 2 hours while in bed  -Keep HOB at 30 degrees     Activity:  -Mobilize patient 3 times a day  -Encourage activity and walks on unit  -Encourage or provide ROM exercises   -Turn and reposition patient every 2 Hours  -Use appropriate equipment to lift or move patient in bed  -Instruct/ Assist with weight shifting every 2 hours when out of bed in chair      Skin Care:  -Avoid use of baby powder, tape, friction and shearing, hot water or constrictive clothing  -Relieve pressure over bony prominences using wedges  -Do not massage red bony areas       Problem: SKIN/TISSUE INTEGRITY - ADULT  Goal: Incision(s), wounds(s) or drain site(s) healing without S/S of infection  Description: INTERVENTIONS  - Assess and document dressing, incision, wound bed, drain sites and surrounding tissue  - Provide patient and family education  - Perform skin care/dressing changes every day and as needed  Outcome: Progressing

## 2023-08-13 NOTE — PROGRESS NOTES
General Surgery  Progress Note   Michael Lopes 70 y.o. female MRN: [id number]  Unit/Bed#: S - Encounter: [id number]    Assessment:  40-year-old female with perforated diverticulitis complicated by colovesicular and colouterine fistula, also noted on CT scan to have a large gallstone, now status post  Ness's, fistula takedown, appendectomy, cholecystectomy and umbilical hernia repair. Episodes of tachypnea requiring nasal cannula oxygen. Otherwise vital signs stable, afebrile. 98% on 6 L nasal cannula. UOP: 1.1 L  NG tube: 60 cc bilious (no documentation for overnight shift)  Right lower quadrant HENRI 1: 160 cc serosang  Right lower quadrant HENRI 2: 315 cc serosang  Colostomy: minimal serosang in bag, no stool or gas. Stoma pink    WBC 14.38 (13.61)  Hgb 9.5 (9.2)  Cr pending (0.73)    Plan:  • Continue NPO/NG tube  • Continue IV fluids while n.p.o.  • Monitor colostomy for output   • Continue IV Zosyn   • Wound care consult for ostomy teaching  • Continue murray x 2 weeks   • DVT ppx: Lovenox  • Pain/ nausea control PRN  • OOB/ ambulation as appropriate  • PT/OT  • Incentive Spirometry      Subjective/Objective     Subjective:   Patient seen and examined at bedside, in no acute distress. No acute events overnight. Patient's pain is well controlled. She denies nausea or vomiting. Expresses discomfort with NGT. Attempted physical therapy yesterday but patient felt too weak to participate. Attempts were made by nursing to assist patient out of bed to chair however, patient's knees were reported to buckle. Objective:   Vitals:Blood pressure 140/71, pulse 103, temperature 97.9 °F (36.6 °C), temperature source Axillary, resp. rate 22, height 4' 9" (1.448 m), weight 67 kg (147 lb 11.3 oz), SpO2 91 %.   Temp (24hrs), Av.9 °F (36.6 °C), Min:97.6 °F (36.4 °C), Max:98.3 °F (36.8 °C)        Intake/Output Summary (Last 24 hours) at 2023 0644  Last data filed at 2023 0618  Gross per 24 hour   Intake 3773.87 ml   Output 1640 ml   Net 2133.87 ml       Invasive Devices     Peripheral Intravenous Line  Duration           Peripheral IV 08/12/23 Left;Proximal;Ventral (anterior) Forearm 1 day    Peripheral IV 08/12/23 Proximal;Right;Ventral (anterior) Antecubital 1 day    Peripheral IV 08/13/23 Dorsal (posterior); Left Forearm <1 day          Drain  Duration           Closed/Suction Drain Right RLQ Bulb 19 Fr. 1 day    Closed/Suction Drain Right RLQ Bulb 19 Fr. 1 day    Colostomy LUQ 1 day    NG/OG/Enteral Tube Nasogastric 18 Fr Right nare 1 day    Urethral Catheter Latex; Double-lumen 16 Fr. 1 day                Physical Exam:  General: No acute distress, alert and oriented. Comfortable in bed. HEENT: NGT  CV: Well perfused, regular rate and rhythm  Lungs: Normal work of breathing, no increased respiratory effort  Abdomen: Soft, appropriately tender, mildly distended. Incision clean, dry and intact. Ostomy with serosang output.  Stoma pink/healthy  Extremities: No edema, clubbing or cyanosis  Skin: Warm, dry    Lab Results:   BMP/CMP:   Lab Results   Component Value Date    SODIUM 140 08/13/2023    K 5.5 (H) 08/13/2023     (H) 08/13/2023    CO2 20 (L) 08/13/2023    BUN 22 08/13/2023    CREATININE 0.73 08/13/2023    CALCIUM 7.7 (L) 08/13/2023    AST 34 08/13/2023    ALT 13 08/13/2023    ALKPHOS 88 08/13/2023    EGFR 86 08/13/2023    and CBC:   Lab Results   Component Value Date    WBC 14.38 (H) 08/14/2023    HGB 9.5 (L) 08/14/2023    HCT 28.8 (L) 08/14/2023    MCV 78 (L) 08/14/2023     08/14/2023    RBC 3.68 (L) 08/14/2023    MCH 25.8 (L) 08/14/2023    MCHC 33.0 08/14/2023    RDW 18.6 (H) 08/14/2023    MPV 10.0 08/14/2023     VTE Prophylaxis: Sequential compression device (Venodyne)  and Enoxaparin (Lovenox)    Andrew Mota MD  8/14/2023

## 2023-08-13 NOTE — ASSESSMENT & PLAN NOTE
· Pt presented to ED with complaints of 10/10 progressive abdominal pain, with associated nausea, vomiting, fevers, chills, and SOB x1 day.    · Imaging in the ED revealed perforated sigmoid diverticulitis with free air, large gallstones, and colovesicular fistula  · General Surgery consulted - input appreciated - pt taken emergently to the OR for ex lap - planning for resection of perforation, cholecystectomy, and likely creation of Tri's   · Admit to ICU post operatively for continued care

## 2023-08-14 ENCOUNTER — APPOINTMENT (INPATIENT)
Dept: RADIOLOGY | Facility: HOSPITAL | Age: 63
DRG: 853 | End: 2023-08-14
Payer: COMMERCIAL

## 2023-08-14 LAB
ANION GAP SERPL CALCULATED.3IONS-SCNC: 11 MMOL/L
BUN SERPL-MCNC: 21 MG/DL (ref 5–25)
CA-I BLD-SCNC: 1.05 MMOL/L (ref 1.12–1.32)
CALCIUM SERPL-MCNC: 7.9 MG/DL (ref 8.4–10.2)
CHLORIDE SERPL-SCNC: 111 MMOL/L (ref 96–108)
CO2 SERPL-SCNC: 21 MMOL/L (ref 21–32)
CREAT SERPL-MCNC: 0.69 MG/DL (ref 0.6–1.3)
ERYTHROCYTE [DISTWIDTH] IN BLOOD BY AUTOMATED COUNT: 18.6 % (ref 11.6–15.1)
GFR SERPL CREATININE-BSD FRML MDRD: 91 ML/MIN/1.73SQ M
GLUCOSE SERPL-MCNC: 71 MG/DL (ref 65–140)
GLUCOSE SERPL-MCNC: 71 MG/DL (ref 65–140)
GLUCOSE SERPL-MCNC: 74 MG/DL (ref 65–140)
GLUCOSE SERPL-MCNC: 76 MG/DL (ref 65–140)
GLUCOSE SERPL-MCNC: 93 MG/DL (ref 65–140)
HCT VFR BLD AUTO: 28.8 % (ref 34.8–46.1)
HGB BLD-MCNC: 9.5 G/DL (ref 11.5–15.4)
MAGNESIUM SERPL-MCNC: 2.3 MG/DL (ref 1.9–2.7)
MCH RBC QN AUTO: 25.8 PG (ref 26.8–34.3)
MCHC RBC AUTO-ENTMCNC: 33 G/DL (ref 31.4–37.4)
MCV RBC AUTO: 78 FL (ref 82–98)
PHOSPHATE SERPL-MCNC: 3.3 MG/DL (ref 2.3–4.1)
PLATELET # BLD AUTO: 311 THOUSANDS/UL (ref 149–390)
PMV BLD AUTO: 10 FL (ref 8.9–12.7)
POTASSIUM SERPL-SCNC: 3.3 MMOL/L (ref 3.5–5.3)
RBC # BLD AUTO: 3.68 MILLION/UL (ref 3.81–5.12)
SODIUM SERPL-SCNC: 143 MMOL/L (ref 135–147)
WBC # BLD AUTO: 14.38 THOUSAND/UL (ref 4.31–10.16)

## 2023-08-14 PROCEDURE — 82948 REAGENT STRIP/BLOOD GLUCOSE: CPT

## 2023-08-14 PROCEDURE — C9113 INJ PANTOPRAZOLE SODIUM, VIA: HCPCS | Performed by: PHYSICIAN ASSISTANT

## 2023-08-14 PROCEDURE — 83735 ASSAY OF MAGNESIUM: CPT

## 2023-08-14 PROCEDURE — 97163 PT EVAL HIGH COMPLEX 45 MIN: CPT

## 2023-08-14 PROCEDURE — 82330 ASSAY OF CALCIUM: CPT

## 2023-08-14 PROCEDURE — 80048 BASIC METABOLIC PNL TOTAL CA: CPT

## 2023-08-14 PROCEDURE — 99024 POSTOP FOLLOW-UP VISIT: CPT | Performed by: SURGERY

## 2023-08-14 PROCEDURE — 44955 APPENDECTOMY ADD-ON: CPT | Performed by: SURGERY

## 2023-08-14 PROCEDURE — 94760 N-INVAS EAR/PLS OXIMETRY 1: CPT

## 2023-08-14 PROCEDURE — 44141 PARTIAL REMOVAL OF COLON: CPT | Performed by: SURGERY

## 2023-08-14 PROCEDURE — 47600 CHOLECYSTECTOMY: CPT | Performed by: SURGERY

## 2023-08-14 PROCEDURE — 71045 X-RAY EXAM CHEST 1 VIEW: CPT

## 2023-08-14 PROCEDURE — 94668 MNPJ CHEST WALL SBSQ: CPT

## 2023-08-14 PROCEDURE — 94640 AIRWAY INHALATION TREATMENT: CPT

## 2023-08-14 PROCEDURE — 94664 DEMO&/EVAL PT USE INHALER: CPT

## 2023-08-14 PROCEDURE — 84100 ASSAY OF PHOSPHORUS: CPT

## 2023-08-14 PROCEDURE — 85027 COMPLETE CBC AUTOMATED: CPT

## 2023-08-14 RX ORDER — POTASSIUM CHLORIDE 14.9 MG/ML
20 INJECTION INTRAVENOUS
Status: DISCONTINUED | OUTPATIENT
Start: 2023-08-14 | End: 2023-08-14

## 2023-08-14 RX ORDER — POTASSIUM CHLORIDE 14.9 MG/ML
20 INJECTION INTRAVENOUS
Status: COMPLETED | OUTPATIENT
Start: 2023-08-14 | End: 2023-08-14

## 2023-08-14 RX ORDER — FUROSEMIDE 10 MG/ML
20 INJECTION INTRAMUSCULAR; INTRAVENOUS ONCE
Status: COMPLETED | OUTPATIENT
Start: 2023-08-14 | End: 2023-08-14

## 2023-08-14 RX ORDER — ACETYLCYSTEINE 200 MG/ML
3 SOLUTION ORAL; RESPIRATORY (INHALATION)
Status: DISCONTINUED | OUTPATIENT
Start: 2023-08-14 | End: 2023-08-14

## 2023-08-14 RX ORDER — ACETYLCYSTEINE 200 MG/ML
3 SOLUTION ORAL; RESPIRATORY (INHALATION)
Status: DISCONTINUED | OUTPATIENT
Start: 2023-08-14 | End: 2023-08-17

## 2023-08-14 RX ADMIN — FUROSEMIDE 20 MG: 10 INJECTION, SOLUTION INTRAMUSCULAR; INTRAVENOUS at 11:16

## 2023-08-14 RX ADMIN — CHLORHEXIDINE GLUCONATE 15 ML: 1.2 SOLUTION ORAL at 21:16

## 2023-08-14 RX ADMIN — PIPERACILLIN AND TAZOBACTAM 3.38 G: 36; 4.5 INJECTION, POWDER, FOR SOLUTION INTRAVENOUS at 15:50

## 2023-08-14 RX ADMIN — FUROSEMIDE 20 MG: 10 INJECTION, SOLUTION INTRAMUSCULAR; INTRAVENOUS at 11:59

## 2023-08-14 RX ADMIN — PIPERACILLIN AND TAZOBACTAM 3.38 G: 36; 4.5 INJECTION, POWDER, FOR SOLUTION INTRAVENOUS at 04:45

## 2023-08-14 RX ADMIN — PIPERACILLIN AND TAZOBACTAM 3.38 G: 36; 4.5 INJECTION, POWDER, FOR SOLUTION INTRAVENOUS at 21:16

## 2023-08-14 RX ADMIN — POTASSIUM CHLORIDE 20 MEQ: 14.9 INJECTION, SOLUTION INTRAVENOUS at 11:15

## 2023-08-14 RX ADMIN — LEVALBUTEROL HYDROCHLORIDE 1.25 MG: 1.25 SOLUTION RESPIRATORY (INHALATION) at 20:18

## 2023-08-14 RX ADMIN — ENOXAPARIN SODIUM 30 MG: 30 INJECTION SUBCUTANEOUS at 09:17

## 2023-08-14 RX ADMIN — POTASSIUM CHLORIDE 20 MEQ: 14.9 INJECTION, SOLUTION INTRAVENOUS at 09:16

## 2023-08-14 RX ADMIN — LEVALBUTEROL HYDROCHLORIDE 1.25 MG: 1.25 SOLUTION RESPIRATORY (INHALATION) at 08:27

## 2023-08-14 RX ADMIN — ACETYLCYSTEINE 600 MG: 200 SOLUTION ORAL; RESPIRATORY (INHALATION) at 20:19

## 2023-08-14 RX ADMIN — ENOXAPARIN SODIUM 30 MG: 30 INJECTION SUBCUTANEOUS at 21:16

## 2023-08-14 RX ADMIN — CHLORHEXIDINE GLUCONATE 15 ML: 1.2 SOLUTION ORAL at 09:16

## 2023-08-14 RX ADMIN — LEVALBUTEROL HYDROCHLORIDE 1.25 MG: 1.25 SOLUTION RESPIRATORY (INHALATION) at 14:17

## 2023-08-14 RX ADMIN — PANTOPRAZOLE SODIUM 40 MG: 40 INJECTION, POWDER, FOR SOLUTION INTRAVENOUS at 09:16

## 2023-08-14 RX ADMIN — ACETYLCYSTEINE 600 MG: 200 SOLUTION ORAL; RESPIRATORY (INHALATION) at 14:18

## 2023-08-14 RX ADMIN — PIPERACILLIN AND TAZOBACTAM 3.38 G: 36; 4.5 INJECTION, POWDER, FOR SOLUTION INTRAVENOUS at 10:02

## 2023-08-14 RX ADMIN — NICOTINE 1 PATCH: 14 PATCH, EXTENDED RELEASE TRANSDERMAL at 09:16

## 2023-08-14 NOTE — PLAN OF CARE
Problem: PHYSICAL THERAPY ADULT  Goal: Performs mobility at highest level of function for planned discharge setting. See evaluation for individualized goals. Description: Treatment/Interventions: Functional transfer training, LE strengthening/ROM, Therapeutic exercise, Cognitive reorientation, Endurance training, Patient/family training, Equipment eval/education, Bed mobility, Gait training, Spoke to nursing, Spoke to case management, OT  Equipment Recommended: Sweetie Cameron       See flowsheet documentation for full assessment, interventions and recommendations. 8/14/2023 1515 by Jad Smith, PT  Note: Prognosis: Fair  Problem List: Decreased strength, Decreased endurance, Impaired balance, Decreased skin integrity, Pain, Obesity, Decreased mobility  Assessment: Pt is a 70 y.o. female seen for PT evaluation s/p admit to [address] [place] [address] on 8/12/2023. Pt was admitted with a primary dx of: perforated diverticulum. PT now consulted for assessment of mobility and d/c needs. Pt with OOB to chair orders. Pts current comorbidities and personal factors effecting treatment include: BMI, HTN. Pts current clinical presentation is Unstable/Unpredictable (high complexity) due to Ongoing medical management for primary dx, Increased reliance on more restrictive AD compared to baseline, Decreased activity tolerance compared to baseline, Fall risk, Increased assistance needed from caregiver at current time, Ongoing telemetry monitoring, Increased O2 via NC from pts baseline, HENRI drain in place at current time, s/p surgical intervention. Prior to admission, pt was independent without AD. Upon evaluation, pt currently is requiring Agusto for bed mobility; gAusto for transfers and Agusto for ambulation 25 ft w/ RW.  Pt presents at PT eval functioning below baseline and currently w/ overall mobility deficits 2* to: BLE weakness, impaired balance, gait deviations, pain, decreased activity tolerance compared to baseline, decreased functional mobility tolerance compared to baseline, decreased safety awareness, impaired judgement, fall risk, SOB upon exertion. Pt currently at a fall risk 2* to impairments listed above. Pt will continue to benefit from skilled acute PT interventions to address stated impairments; to maximize functional mobility; for ongoing pt/ family training; and DME needs. At conclusion of PT session chair alarm engaged, all needs in reach, RN notified of session findings/recommendations and pt returned back in recliner chair with phone and call bell within reach. Pt denies any further questions at this time. Recommend home with HHPT upon hospital D/C. PT Discharge Recommendation: Home with home health rehabilitation    See flowsheet documentation for full assessment. 8/14/2023 1514 by Lynne Sicard, PT  Note: Prognosis: Fair  Problem List: Decreased strength, Decreased endurance, Impaired balance, Decreased skin integrity, Pain, Obesity, Decreased mobility  Assessment: Pt is a 70 y.o. female seen for PT evaluation s/p admit to [address] [place] [address] on 8/12/2023. Pt was admitted with a primary dx of: perforated diverticulum. PT now consulted for assessment of mobility and d/c needs. Pt with OOB to chair orders. Pts current comorbidities and personal factors effecting treatment include: BMI, HTN. Pts current clinical presentation is Unstable/Unpredictable (high complexity) due to Ongoing medical management for primary dx, Increased reliance on more restrictive AD compared to baseline, Decreased activity tolerance compared to baseline, Fall risk, Increased assistance needed from caregiver at current time, Ongoing telemetry monitoring, Increased O2 via NC from pts baseline, HENRI drain in place at current time, s/p surgical intervention. Prior to admission, pt was independent without AD. Upon evaluation, pt currently is requiring Agusto for bed mobility; Agusto for transfers and Agusto for ambulation 25 ft w/ RW.  Pt presents at PT eval functioning below baseline and currently w/ overall mobility deficits 2* to: BLE weakness, impaired balance, gait deviations, pain, decreased activity tolerance compared to baseline, decreased functional mobility tolerance compared to baseline, decreased safety awareness, impaired judgement, fall risk, SOB upon exertion. Pt currently at a fall risk 2* to impairments listed above. Pt will continue to benefit from skilled acute PT interventions to address stated impairments; to maximize functional mobility; for ongoing pt/ family training; and DME needs. At conclusion of PT session chair alarm engaged, all needs in reach, RN notified of session findings/recommendations and pt returned back in recliner chair with phone and call bell within reach. Pt denies any further questions at this time. Recommend home with HHPT upon hospital D/C. PT Discharge Recommendation: Home with home health rehabilitation    See flowsheet documentation for full assessment.

## 2023-08-14 NOTE — RESPIRATORY THERAPY NOTE
RT Protocol Note  Ethan Pedersen 70 y.o. female MRN: [id number]  Unit/Bed#: S -01 Encounter: [id number]    Assessment    Principal Problem:    Perforated diverticulum  Active Problems:    Hypertension      Home Pulmonary Medications:  none       Past Medical History:   Diagnosis Date   • Hypertension      Social History     Socioeconomic History   • Marital status: /Civil Union     Spouse name: None   • Number of children: None   • Years of education: None   • Highest education level: None   Occupational History   • None   Tobacco Use   • Smoking status: Every Day     Packs/day: 1.00     Types: Cigarettes   • Smokeless tobacco: Never   Vaping Use   • Vaping Use: Never used   Substance and Sexual Activity   • Alcohol use: Never   • Drug use: Never   • Sexual activity: None   Other Topics Concern   • None   Social History Narrative   • None     Social Determinants of Health     Financial Resource Strain: Not on file   Food Insecurity: Not on file   Transportation Needs: Not on file   Physical Activity: Not on file   Stress: Not on file   Social Connections: Not on file   Intimate Partner Violence: Not on file   Housing Stability: Not on file       Subjective         Objective    Physical Exam:   Assessment Type: Pre-treatment  General Appearance: Alert, Awake  Respiratory Pattern: Shallow  Chest Assessment: Chest expansion symmetrical  Bilateral Breath Sounds: Diminished, Clear    Vitals:  Blood pressure 141/82, pulse 101, temperature 97.9 °F (36.6 °C), resp. rate 20, height 4' 9" (1.448 m), weight 67 kg (147 lb 11.3 oz), SpO2 93 %. Imaging and other studies: I have personally reviewed pertinent reports. Plan    Respiratory Plan: No distress/Pulmonary history  Airway Clearance Plan: Incentive Spirometer, Flutter   Pt ordered on VEST but pt has drains and according to pt has large incision, pt very tender when I accidentally touched her belly.  Pt is able to do flutter and willing to do on her own Vest not started at this time for these reasons      Resp Comments: pt desating on 6l NC 84%, placed on mid flow 14l at this time pt is not in any distress, recommend pt get out of bed

## 2023-08-14 NOTE — WOUND OSTOMY CARE
Progress Note- Ostomy  Patricia Nails 70 y.o. female  [id number]  S -S -01        Assessment:  Patient seen today for introduction to ostomy team and teaching. Family is also at bedside and will be very involved with patient's care. Patient is POD #1 colostomy on LLQ. Patient is now sitting in chair, after therapy, with PCA pump running and has NG tube in place, patient is still NPO. Patient not up for full pouch change today, will be seeing patient and family on Wednesday at 11am for ostomy pouch change and teaching session. Topics reviewed today were showering with the pouch, different types of pouching systems, emptying the pouch. Supplies left at bedside and literature "Living with a Colostomy" given to patient and encouraged to read before next teaching session. Ostomy Care:  1. Peel back pouch using push-pull method  2. Use warm water only to cleanse skin around the stoma (amber-stomal skin). 3. Make sure all adhesive residue is removed and skin is dry and not oily. 4. Measure stoma size using measuring guide and trace correct measurements onto back of pouch. 5. Then mold the backing of pouch out to correct shape/size. 6. Use 3M no sting barrier film to prep the skin around the stoma. 7. Place pouch over stoma and onto skin. 8. Use warmth of hand to apply gentle pressure to help backing of pouch to adhere well to skin. 9. Empty pouch when 1/3 full. 10. Change pouch every 3-4 days or if signs of leaking. 11. Will continue to follow patient while an inpatient for Ostomy teaching/education. Stoma:     Res, budded stoma in well intact one piece ostomy pouch.                Christa PALMERN, RN, [facility]

## 2023-08-14 NOTE — DISCHARGE INSTR - OTHER ORDERS
Ostomy Care: While you were in the hospital you were using Coloplast 1 piece cut-to-fit pouching system and also tried Convatec 2 piece moldable pouching system. Your stoma measured 1 3/4 inch. Measure your stoma at least weekly as it will shrink for 6-8 weeks after surgery. Ostomy Education Resources:   [facility]. org   [employer]ww[employer].FoodText   Www.coloplast.us   Www.[employer]UM[employer]. FoodText    Ostomy Care:  1. Peel back pouch using push-pull method  2. Use warm water only to cleanse skin around the stoma (amber-stomal skin). 3. Make sure all adhesive residue is removed and skin is dry and not oily. 4. Measure stoma size using measuring guide and trace correct measurements onto back of pouch. 5. Then mold the backing of pouch out to correct shape/size. 6. Use 3M no sting barrier film to prep the skin around the stoma. 7. Place pouch over stoma and onto skin. 8. Use warmth of hand to apply gentle pressure to help backing of pouch to adhere well to skin. 9. Empty pouch when 1/3 full. 10. Change pouch every 3-4 days or if signs of leaking.

## 2023-08-14 NOTE — PLAN OF CARE
Problem: MOBILITY - ADULT  Goal: Maintain or return to baseline ADL function  Description: INTERVENTIONS:  -  Assess patient's ability to carry out ADLs; assess patient's baseline for ADL function and identify physical deficits which impact ability to perform ADLs (bathing, care of mouth/teeth, toileting, grooming, dressing, etc.)  - Assess/evaluate cause of self-care deficits   - Assess range of motion  - Assess patient's mobility; develop plan if impaired  - Assess patient's need for assistive devices and provide as appropriate  - Encourage maximum independence but intervene and supervise when necessary  - Involve family in performance of ADLs  - Assess for home care needs following discharge   - Consider OT consult to assist with ADL evaluation and planning for discharge  - Provide patient education as appropriate  Outcome: Progressing      Problem: Prexisting or High Potential for Compromised Skin Integrity  Goal: Skin integrity is maintained or improved  Description: INTERVENTIONS:  - Identify patients at risk for skin breakdown  - Assess and monitor skin integrity  - Assess and monitor nutrition and hydration status  - Monitor labs   - Assess for incontinence   - Turn and reposition patient  - Assist with mobility/ambulation  - Relieve pressure over bony prominences  - Avoid friction and shearing  - Provide appropriate hygiene as needed including keeping skin clean and dry  - Evaluate need for skin moisturizer/barrier cream  - Collaborate with interdisciplinary team   - Patient/family teaching  - Consider wound care consult   Outcome: Progressing     Problem: GASTROINTESTINAL - ADULT  Goal: Minimal or absence of nausea and/or vomiting  Description: INTERVENTIONS:  - Administer IV fluids if ordered to ensure adequate hydration  - Maintain NPO status until nausea and vomiting are resolved  - Nasogastric tube if ordered  - Administer ordered antiemetic medications as needed  - Provide nonpharmacologic comfort measures as appropriate  - Advance diet as tolerated, if ordered  - Consider nutrition services referral to assist patient with adequate nutrition and appropriate food choices  Outcome: Progressing

## 2023-08-14 NOTE — PHYSICAL THERAPY NOTE
Physical Therapy Evaluation    Patient's Name: Galilea Koch    Admitting Diagnosis  Pneumoperitoneum [K66.8]  Hepatic cyst [K76.89]  Cholelithiasis [K80.20]  Abdominal pain [R10.9]  Colovesical fistula [N32.1]  Severe sepsis ([address]) [A41.9, R65.20]  Diverticulitis of large intestine with perforation and abscess [K57.20]    Problem List  Patient Active Problem List   Diagnosis    Hypertension    Encounter for screening mammogram for malignant neoplasm of breast    Hemorrhoids    Painful urination    Constipation    Urinary frequency    Weight loss    Other fatigue    Pelvic pain    Colon cancer screening    Asymptomatic menopause    Bilateral groin pain    High serum ferritin    Healthcare maintenance    Abdominal wall hernia    Perforated diverticulum       Past Medical History  Past Medical History:   Diagnosis Date    Hypertension        Past Surgical History  Past Surgical History:   Procedure Laterality Date    APPENDECTOMY N/A 8/12/2023    Procedure: APPENDECTOMY;  Surgeon: Elissa Panchal DO;  [facility];  Service: General    CHOLECYSTECTOMY N/A 8/12/2023    Procedure: CHOLECYSTECTOMY;  Surgeon: Elissa Panchal DO;  [facility];  Service: General    HARTMANS PROCEDURE N/A 8/12/2023    Procedure: Anna Dayhoff;  Surgeon: Elissa Panchal DO;  [facility];  Service: General    LAPAROTOMY N/A 8/12/2023    Procedure: LAPAROTOMY EXPLORATORY, TAKEDOWN OF COLOVESICLE FISTULA, REPAIR OF EPIGASTRIC HERNIA;  Surgeon: Elissa Panchal DO;  [facility];  Service: General      08/14/23 1138   Note Type   Note type Evaluation   Pain Assessment   Pain Assessment Tool 0-10   Pain Score 3   Pain Location/Orientation Location: Abdomen   Restrictions/Precautions   Weight Bearing Precautions Per Order No   Other Precautions Chair Alarm; Bed Alarm;Telemetry;Multiple lines;O2;Fall Risk  (14 L mid flwo, cathether, HENRI drain)   Home Living   Type of Home House Home Layout Two level;Bed/bath upstairs; Performs ADLs on one level; Able to live on main level with bedroom/bathroom  (0 SHANI, full bath on first floor)   Bathroom Shower/Tub Walk-in shower   [address]   Additional Comments can has first floor set up   Prior Function   Lives With [address] in the last 6 months 0   Comments no AD at baseline   General   Family/Caregiver Present Yes   Cognition   Orientation Level Oriented X4   Following Commands Follows one step commands without difficulty   Comments pt ID by wristband, name and    Subjective   Subjective pt supine in bed agreeable to PT eval   RLE Assessment   RLE Assessment WFL  (functionally 3+/5)   LLE Assessment   LLE Assessment WFL  (functionally 3+/5)   Bed Mobility   Supine to Sit 4  Minimal assistance   Additional items Assist x 1; Increased time required;LE management  (trunk management)   Sit to Supine Unable to assess   Additional Comments pt OOB in recliner at end of session   Transfers   Sit to Stand 4  Minimal assistance   Additional items Assist x 1; Increased time required   Stand to Sit 4  Minimal assistance   Additional items Increased time required;Verbal cues; Assist x 1   Additional Comments vc for hand placement, trunk positioning   Ambulation/Elevation   Gait pattern Narrow AVELINA; Forward Flexion; Short stride   Gait Assistance 4  Minimal assist   Additional items Assist x 1;Verbal cues   Assistive Device Rolling walker   Distance 5'x1, 25'x2   Stair Management Assistance Not tested   Ambulation/Elevation Additional Comments pt required standing rest during ambulation, denies pain, pt sp O2 greater than 885 throughout session   Balance   Static Sitting Fair +   Dynamic Sitting Fair   Static Standing Fair   Dynamic Standing [address] [facility] -  (RW)   Activity Tolerance   Activity Tolerance Patient limited by fatigue   Medical Staff Made Aware Trauma fellow, Wound care RN   Nurse Made Aware RN hayden   Assessment   Prognosis Fair   Problem List Decreased strength;Decreased endurance; Impaired balance;Decreased skin integrity;Pain;Obesity; Decreased mobility   Assessment Pt is a 70 y.o. female seen for PT evaluation s/p admit to [facility] on 8/12/2023. Pt was admitted with a primary dx of: perforated diverticulum. PT now consulted for assessment of mobility and d/c needs. Pt with OOB to chair orders. Pts current comorbidities and personal factors effecting treatment include: BMI, HTN. Pts current clinical presentation is Unstable/Unpredictable (high complexity) due to Ongoing medical management for primary dx, Increased reliance on more restrictive AD compared to baseline, Decreased activity tolerance compared to baseline, Fall risk, Increased assistance needed from caregiver at current time, Ongoing telemetry monitoring, Increased O2 via NC from pts baseline, HENRI drain in place at current time, s/p surgical intervention. Prior to admission, pt was independent without AD. Upon evaluation, pt currently is requiring Agusto for bed mobility; Agusto for transfers and Agusto for ambulation 25 ft w/ RW. Pt presents at PT eval functioning below baseline and currently w/ overall mobility deficits 2* to: BLE weakness, impaired balance, gait deviations, pain, decreased activity tolerance compared to baseline, decreased functional mobility tolerance compared to baseline, decreased safety awareness, impaired judgement, fall risk, SOB upon exertion. Pt currently at a fall risk 2* to impairments listed above. Pt will continue to benefit from skilled acute PT interventions to address stated impairments; to maximize functional mobility; for ongoing pt/ family training; and DME needs. At conclusion of PT session chair alarm engaged, all needs in reach, RN notified of session findings/recommendations and pt returned back in recliner chair with phone and call bell within reach.  Pt denies any further questions at this time. Recommend home with HHPT upon hospital D/C. Goals   Patient Goals to go home   STG Expiration Date 08/24/23   Short Term Goal #1 In 10 days pt will be able to: 1. Demonstrate ability to perform all aspects of bed mobility independently to improve functional safety. 2. Perform functional transfers independently to facilitate safe return to previous living environment. 3.  Ambulate 150 ft with LRAD independently with stable vitals to improve safety with household distances and reduce fall risk. 4. Improve LE strength grades by 1 to increase ease of functional mobility with transfers and gait. 5. Pt will demonstrate improved balance by one grade in order to decrease risk of falls. 6. Climb 12 steps with 1 HR independently to simulate access to second floor of home. PT Treatment Day 0   Plan   Treatment/Interventions Functional transfer training;LE strengthening/ROM; Therapeutic exercise;Cognitive reorientation; Endurance training;Patient/family training;Equipment eval/education; Bed mobility;Gait training;Spoke to nursing;Spoke to case management;OT   PT Frequency 3-5x/wk   Recommendation   PT Discharge Recommendation Home with home health rehabilitation   Equipment Recommended [address] walker   AM-PAC Basic Mobility Inpatient   Turning in Flat Bed Without Bedrails 2   Lying on Back to Sitting on Edge of Flat Bed Without Bedrails 2   Moving Bed to Chair 3   Standing Up From Chair Using Arms 3   Walk in Room 3   Climb 3-5 Stairs With Railing 3   Basic Mobility Inpatient Raw Score 16   Basic Mobility Standardized Score 38.32   Highest Level Of Mobility   -HLM Goal 5: Stand one or more mins   JH-HLM Achieved 7: Walk 25 feet or more   End of Consult   Patient Position at End of Consult Bedside chair;Bed/Chair alarm activated; All needs within reach   The patient's AM-PAC Basic Mobility Inpatient Short Form Raw Score is 16.  A Raw score of less than or equal to 16 suggests the patient may benefit from discharge to post-acute rehabilitation services. However, Please also refer to the recommendation of the Physical Therapist for safe discharge planning.   Harika Villatoro, PT

## 2023-08-15 ENCOUNTER — APPOINTMENT (INPATIENT)
Dept: RADIOLOGY | Facility: HOSPITAL | Age: 63
DRG: 853 | End: 2023-08-15
Payer: COMMERCIAL

## 2023-08-15 PROBLEM — Z12.11 COLON CANCER SCREENING: Status: RESOLVED | Noted: 2023-06-16 | Resolved: 2023-08-15

## 2023-08-15 PROBLEM — Z00.00 HEALTHCARE MAINTENANCE: Status: RESOLVED | Noted: 2023-06-16 | Resolved: 2023-08-15

## 2023-08-15 LAB
ANION GAP SERPL CALCULATED.3IONS-SCNC: 11 MMOL/L
ANION GAP SERPL CALCULATED.3IONS-SCNC: 9 MMOL/L
ATRIAL RATE: 110 BPM
BACTERIA UR CULT: NORMAL
BASOPHILS # BLD AUTO: 0.01 THOUSANDS/ÂΜL (ref 0–0.1)
BASOPHILS NFR BLD AUTO: 0 % (ref 0–1)
BNP SERPL-MCNC: 265 PG/ML (ref 0–100)
BUN SERPL-MCNC: 20 MG/DL (ref 5–25)
BUN SERPL-MCNC: 21 MG/DL (ref 5–25)
CALCIUM SERPL-MCNC: 7.5 MG/DL (ref 8.4–10.2)
CALCIUM SERPL-MCNC: 7.7 MG/DL (ref 8.4–10.2)
CHLORIDE SERPL-SCNC: 106 MMOL/L (ref 96–108)
CHLORIDE SERPL-SCNC: 108 MMOL/L (ref 96–108)
CO2 SERPL-SCNC: 24 MMOL/L (ref 21–32)
CO2 SERPL-SCNC: 24 MMOL/L (ref 21–32)
CREAT SERPL-MCNC: 0.61 MG/DL (ref 0.6–1.3)
CREAT SERPL-MCNC: 0.67 MG/DL (ref 0.6–1.3)
EOSINOPHIL # BLD AUTO: 0.01 THOUSAND/ÂΜL (ref 0–0.61)
EOSINOPHIL NFR BLD AUTO: 0 % (ref 0–6)
ERYTHROCYTE [DISTWIDTH] IN BLOOD BY AUTOMATED COUNT: 18.2 % (ref 11.6–15.1)
GFR SERPL CREATININE-BSD FRML MDRD: 92 ML/MIN/1.73SQ M
GFR SERPL CREATININE-BSD FRML MDRD: 95 ML/MIN/1.73SQ M
GLUCOSE SERPL-MCNC: 110 MG/DL (ref 65–140)
GLUCOSE SERPL-MCNC: 112 MG/DL (ref 65–140)
GLUCOSE SERPL-MCNC: 62 MG/DL (ref 65–140)
GLUCOSE SERPL-MCNC: 65 MG/DL (ref 65–140)
GLUCOSE SERPL-MCNC: 67 MG/DL (ref 65–140)
GLUCOSE SERPL-MCNC: 68 MG/DL (ref 65–140)
GLUCOSE SERPL-MCNC: 78 MG/DL (ref 65–140)
GLUCOSE SERPL-MCNC: 99 MG/DL (ref 65–140)
HCT VFR BLD AUTO: 29.9 % (ref 34.8–46.1)
HGB BLD-MCNC: 9.7 G/DL (ref 11.5–15.4)
IMM GRANULOCYTES # BLD AUTO: 0.11 THOUSAND/UL (ref 0–0.2)
IMM GRANULOCYTES NFR BLD AUTO: 1 % (ref 0–2)
LYMPHOCYTES # BLD AUTO: 0.76 THOUSANDS/ÂΜL (ref 0.6–4.47)
LYMPHOCYTES NFR BLD AUTO: 7 % (ref 14–44)
MAGNESIUM SERPL-MCNC: 1.9 MG/DL (ref 1.9–2.7)
MCH RBC QN AUTO: 25.1 PG (ref 26.8–34.3)
MCHC RBC AUTO-ENTMCNC: 32.4 G/DL (ref 31.4–37.4)
MCV RBC AUTO: 78 FL (ref 82–98)
MONOCYTES # BLD AUTO: 0.42 THOUSAND/ÂΜL (ref 0.17–1.22)
MONOCYTES NFR BLD AUTO: 4 % (ref 4–12)
NEUTROPHILS # BLD AUTO: 10.45 THOUSANDS/ÂΜL (ref 1.85–7.62)
NEUTS SEG NFR BLD AUTO: 88 % (ref 43–75)
NRBC BLD AUTO-RTO: 0 /100 WBCS
P AXIS: 63 DEGREES
PHOSPHATE SERPL-MCNC: 3.3 MG/DL (ref 2.3–4.1)
PLATELET # BLD AUTO: 327 THOUSANDS/UL (ref 149–390)
PMV BLD AUTO: 10 FL (ref 8.9–12.7)
POTASSIUM SERPL-SCNC: 3 MMOL/L (ref 3.5–5.3)
POTASSIUM SERPL-SCNC: 3.9 MMOL/L (ref 3.5–5.3)
PR INTERVAL: 158 MS
QRS AXIS: 23 DEGREES
QRSD INTERVAL: 96 MS
QT INTERVAL: 384 MS
QTC INTERVAL: 514 MS
RBC # BLD AUTO: 3.86 MILLION/UL (ref 3.81–5.12)
SODIUM SERPL-SCNC: 141 MMOL/L (ref 135–147)
SODIUM SERPL-SCNC: 141 MMOL/L (ref 135–147)
T WAVE AXIS: 118 DEGREES
VENTRICULAR RATE: 108 BPM
WBC # BLD AUTO: 11.76 THOUSAND/UL (ref 4.31–10.16)

## 2023-08-15 PROCEDURE — 94760 N-INVAS EAR/PLS OXIMETRY 1: CPT

## 2023-08-15 PROCEDURE — 85025 COMPLETE CBC W/AUTO DIFF WBC: CPT

## 2023-08-15 PROCEDURE — 80048 BASIC METABOLIC PNL TOTAL CA: CPT

## 2023-08-15 PROCEDURE — 83880 ASSAY OF NATRIURETIC PEPTIDE: CPT

## 2023-08-15 PROCEDURE — 94640 AIRWAY INHALATION TREATMENT: CPT

## 2023-08-15 PROCEDURE — 83735 ASSAY OF MAGNESIUM: CPT

## 2023-08-15 PROCEDURE — 99024 POSTOP FOLLOW-UP VISIT: CPT | Performed by: SURGERY

## 2023-08-15 PROCEDURE — 84100 ASSAY OF PHOSPHORUS: CPT

## 2023-08-15 PROCEDURE — 71045 X-RAY EXAM CHEST 1 VIEW: CPT

## 2023-08-15 PROCEDURE — 99255 IP/OBS CONSLTJ NEW/EST HI 80: CPT | Performed by: STUDENT IN AN ORGANIZED HEALTH CARE EDUCATION/TRAINING PROGRAM

## 2023-08-15 PROCEDURE — 82948 REAGENT STRIP/BLOOD GLUCOSE: CPT

## 2023-08-15 PROCEDURE — 93010 ELECTROCARDIOGRAM REPORT: CPT | Performed by: INTERNAL MEDICINE

## 2023-08-15 PROCEDURE — C9113 INJ PANTOPRAZOLE SODIUM, VIA: HCPCS | Performed by: PHYSICIAN ASSISTANT

## 2023-08-15 PROCEDURE — 94668 MNPJ CHEST WALL SBSQ: CPT

## 2023-08-15 RX ORDER — POTASSIUM CHLORIDE 14.9 MG/ML
20 INJECTION INTRAVENOUS
Status: COMPLETED | OUTPATIENT
Start: 2023-08-15 | End: 2023-08-15

## 2023-08-15 RX ORDER — DEXTROSE MONOHYDRATE 25 G/50ML
INJECTION, SOLUTION INTRAVENOUS
Status: COMPLETED
Start: 2023-08-15 | End: 2023-08-15

## 2023-08-15 RX ORDER — DEXTROSE MONOHYDRATE 25 G/50ML
25 INJECTION, SOLUTION INTRAVENOUS ONCE
Status: COMPLETED | OUTPATIENT
Start: 2023-08-15 | End: 2023-08-15

## 2023-08-15 RX ORDER — FUROSEMIDE 10 MG/ML
40 INJECTION INTRAMUSCULAR; INTRAVENOUS ONCE
Status: COMPLETED | OUTPATIENT
Start: 2023-08-15 | End: 2023-08-15

## 2023-08-15 RX ORDER — MAGNESIUM SULFATE HEPTAHYDRATE 40 MG/ML
2 INJECTION, SOLUTION INTRAVENOUS ONCE
Status: COMPLETED | OUTPATIENT
Start: 2023-08-15 | End: 2023-08-15

## 2023-08-15 RX ADMIN — ENOXAPARIN SODIUM 30 MG: 30 INJECTION SUBCUTANEOUS at 08:25

## 2023-08-15 RX ADMIN — LEVALBUTEROL HYDROCHLORIDE 1.25 MG: 1.25 SOLUTION RESPIRATORY (INHALATION) at 20:00

## 2023-08-15 RX ADMIN — DEXTROSE MONOHYDRATE 25 ML: 25 INJECTION, SOLUTION INTRAVENOUS at 06:18

## 2023-08-15 RX ADMIN — POTASSIUM CHLORIDE 20 MEQ: 14.9 INJECTION, SOLUTION INTRAVENOUS at 06:18

## 2023-08-15 RX ADMIN — PANTOPRAZOLE SODIUM 40 MG: 40 INJECTION, POWDER, FOR SOLUTION INTRAVENOUS at 08:25

## 2023-08-15 RX ADMIN — POTASSIUM CHLORIDE 20 MEQ: 14.9 INJECTION, SOLUTION INTRAVENOUS at 12:57

## 2023-08-15 RX ADMIN — ENOXAPARIN SODIUM 30 MG: 30 INJECTION SUBCUTANEOUS at 20:48

## 2023-08-15 RX ADMIN — PIPERACILLIN AND TAZOBACTAM 3.38 G: 36; 4.5 INJECTION, POWDER, FOR SOLUTION INTRAVENOUS at 15:03

## 2023-08-15 RX ADMIN — NICOTINE 1 PATCH: 14 PATCH, EXTENDED RELEASE TRANSDERMAL at 08:33

## 2023-08-15 RX ADMIN — ACETYLCYSTEINE 600 MG: 200 SOLUTION ORAL; RESPIRATORY (INHALATION) at 07:31

## 2023-08-15 RX ADMIN — FUROSEMIDE 40 MG: 10 INJECTION, SOLUTION INTRAMUSCULAR; INTRAVENOUS at 17:23

## 2023-08-15 RX ADMIN — PIPERACILLIN AND TAZOBACTAM 3.38 G: 36; 4.5 INJECTION, POWDER, FOR SOLUTION INTRAVENOUS at 09:58

## 2023-08-15 RX ADMIN — LEVALBUTEROL HYDROCHLORIDE 1.25 MG: 1.25 SOLUTION RESPIRATORY (INHALATION) at 13:35

## 2023-08-15 RX ADMIN — PIPERACILLIN AND TAZOBACTAM 3.38 G: 36; 4.5 INJECTION, POWDER, FOR SOLUTION INTRAVENOUS at 04:24

## 2023-08-15 RX ADMIN — Medication 1 SPRAY: at 08:33

## 2023-08-15 RX ADMIN — PIPERACILLIN AND TAZOBACTAM 3.38 G: 36; 4.5 INJECTION, POWDER, FOR SOLUTION INTRAVENOUS at 21:00

## 2023-08-15 RX ADMIN — POTASSIUM CHLORIDE 20 MEQ: 14.9 INJECTION, SOLUTION INTRAVENOUS at 08:36

## 2023-08-15 RX ADMIN — CHLORHEXIDINE GLUCONATE 15 ML: 1.2 SOLUTION ORAL at 20:45

## 2023-08-15 RX ADMIN — ACETYLCYSTEINE 600 MG: 200 SOLUTION ORAL; RESPIRATORY (INHALATION) at 13:36

## 2023-08-15 RX ADMIN — LEVALBUTEROL HYDROCHLORIDE 1.25 MG: 1.25 SOLUTION RESPIRATORY (INHALATION) at 07:31

## 2023-08-15 RX ADMIN — CHLORHEXIDINE GLUCONATE 15 ML: 1.2 SOLUTION ORAL at 08:25

## 2023-08-15 RX ADMIN — MAGNESIUM SULFATE HEPTAHYDRATE 2 G: 40 INJECTION, SOLUTION INTRAVENOUS at 10:59

## 2023-08-15 RX ADMIN — ACETYLCYSTEINE 600 MG: 200 SOLUTION ORAL; RESPIRATORY (INHALATION) at 20:00

## 2023-08-15 RX ADMIN — POTASSIUM CHLORIDE 20 MEQ: 14.9 INJECTION, SOLUTION INTRAVENOUS at 10:41

## 2023-08-15 NOTE — UTILIZATION REVIEW
Continued Stay Review    Date: 8/15/23                          Current Patient Class: IP  Current Level of Care: Level 2 Stepdown     HPI:63 y.o. female initially admitted on 8/12/23 - DX: Perforated diverticulum     Assessment/Plan:   8/15/23: POD # 2 - s/p Ness's, fistula takedown, appendectomy, cholecystectomy and umbilical hernia repair. Yesterday patient was found to be tachypneic and hypoxic; critical care consult was obtained and patient was diuresed with adequate response in terms of urine output; did continue to require mid flow nasal cannula to support her oxygen saturation. This morning blood glucose on BMP was noted to be 65; patient was given D5 for correction. Patient's pain is well controlled. She denies nausea or vomiting. And out of chair yesterday, plans to ambulate more today. 90 to 91% on 10 to 14 L flow nasal cannula overnight. UOP: 3.12; NG tube: 760 cc bilious (no documentation for overnight shift); Right lower quadrant HENRI 1: 90 cc serosang; Right lower quadrant HENRI 2: 190 cc serosang; Colostomy: minimal serosang in bag, no stool or gas.  Stoma pink; WBC 11 from 14; Hgb 9.7 from 9.5; Cr 0.67  Plan: cont iv abx; cont iv protonix; cont nebs; cont pain control - PCA - dilaudid; titrate O2; monitor labs; rec'd additional D50 iv; (yesterday 8/14 rec'd lasix iv x2); cont NPO / NG tube; cont ivf; await return of bowel function; maintain murray; PT / OT eval - tx    Vital Signs:   Date/Time Temp Pulse Resp BP MAP (mmHg) SpO2 Calculated FIO2 (%) - Nasal Cannula O2 Flow Rate (L/min) Nasal Cannula O2 Flow Rate (L/min) O2 Device O2 Interface Device Patient Position - Orthostatic VS   08/15/23 0812 -- -- -- -- -- -- -- -- -- -- MFNC prongs --   08/15/23 0733 -- -- -- -- -- -- 76 -- 14 L/min Mid flow nasal cannula MFNC prongs --   08/15/23 07:23:21 -- -- 18 138/89 105 -- -- -- -- -- -- --   08/15/23 04:27:33 -- 108 Abnormal  -- 140/88 105 89 % Abnormal  -- -- -- -- -- --   08/15/23 0400 -- 100 16 -- -- 91 % -- -- -- -- -- --   08/15/23 02:12:33 -- 106 Abnormal  18 141/86 104 92 % 76 -- 14 L/min Mid flow nasal cannula -- Lying         Pertinent Labs/Diagnostic Results:       Results from last 7 days   Lab Units 08/15/23  0508 08/14/23  0619 08/13/23  1459 08/13/23  0351 08/12/23  2149 08/12/23  1722   WBC Thousand/uL 11.76* 14.38* 13.61* 13.26*  --  17.72*   HEMOGLOBIN g/dL 9.7* 9.5* 9.2* 10.4*  --  13.2   HEMATOCRIT % 29.9* 28.8* 28.4* 32.6*  --  40.7   PLATELETS Thousands/uL 327 311 298 336   < > 473*   NEUTROS ABS Thousands/µL 10.45*  --   --   --   --   --    BANDS PCT %  --   --   --   --   --  20*    < > = values in this interval not displayed.          Results from last 7 days   Lab Units 08/15/23  0508 08/14/23  0619 08/13/23  1551 08/13/23  0351 08/12/23  1722   SODIUM mmol/L 141 143 140 140 138   POTASSIUM mmol/L 3.0* 3.3* 5.5* 3.2* 3.3*   CHLORIDE mmol/L 106 111* 112* 111* 102   CO2 mmol/L 24 21 20* 19* 23   ANION GAP mmol/L 11 11 8 10 13   BUN mg/dL 20 21 22 26* 27*   CREATININE mg/dL 0.67 0.69 0.73 0.88 1.06   EGFR ml/min/1.73sq m 92 91 86 69 55   CALCIUM mg/dL 7.5* 7.9* 7.7* 7.1* 9.1   CALCIUM, IONIZED mmol/L  --  1.05*  --  0.99*  --    MAGNESIUM mg/dL 1.9 2.3  --  1.7* 1.7*   PHOSPHORUS mg/dL 3.3 3.3  --  5.6*  --      Results from last 7 days   Lab Units 08/13/23  1551 08/13/23  0351 08/12/23  1722   AST U/L 34 37 10*   ALT U/L 13 14 7   ALK PHOS U/L 88 106* 186*   TOTAL PROTEIN g/dL 5.5* 5.3* 7.2   ALBUMIN g/dL 3.0* 3.0* 3.5   TOTAL BILIRUBIN mg/dL 1.18* 1.80* 2.30*   BILIRUBIN DIRECT mg/dL  --  1.30*  --      Results from last 7 days   Lab Units 08/15/23  0652 08/15/23  0602 08/15/23  0008 08/14/23  1759 08/14/23  1156 08/14/23  0628 08/14/23  0006 08/13/23  1812 08/13/23  1324   POC GLUCOSE mg/dl 99 62* 78 76 71 71 93 103 112     Results from last 7 days   Lab Units 08/15/23  0508 08/14/23  0619 08/13/23  1551 08/13/23  0351 08/12/23  1722   GLUCOSE RANDOM mg/dL 65 74 102 132 113       Results from last 7 days   Lab Units 08/13/23  1459 08/13/23 0351   PH GIDEON  7.329 7.248*   PCO2 GIDEON mm Hg 40.8* 46.6   PO2 GIDEON mm Hg 34.2* 53.2*   HCO3 GIDEON mmol/L 21.0* 19.9*   BASE EXC GIDEON mmol/L -4.6 -7.3   O2 CONTENT GIDEON ml/dL 9.1 14.3   O2 HGB, VENOUS % 64.9 82.1*       Results from last 7 days   Lab Units 08/12/23 2150 08/12/23 1951   HS TNI 0HR ng/L  --  5   HS TNI 2HR ng/L 5  --    HSTNI D2 ng/L 0  --        Results from last 7 days   Lab Units 08/12/23 1951   PROTIME seconds 16.2*   INR  1.23*   PTT seconds 31       Results from last 7 days   Lab Units 08/13/23 0351 08/12/23  1722   PROCALCITONIN ng/ml 20.26* 14.61*     Results from last 7 days   Lab Units 08/13/23 0351 08/12/23 2150 08/12/23 1951   LACTIC ACID mmol/L 1.0 2.4* 3.0*       Results from last 7 days   Lab Units 08/12/23  1722   LIPASE u/L <6*       Results from last 7 days   Lab Units 08/12/23 2123   CLARITY UA  Turbid   COLOR UA  Yellow   SPEC GRAV UA  1.025   PH UA  6.0   GLUCOSE UA mg/dl Negative   KETONES UA mg/dl Negative   BLOOD UA  Moderate*   PROTEIN UA mg/dl Trace*   NITRITE UA  Negative   BILIRUBIN UA  Negative   UROBILINOGEN UA (BE) mg/dl 2.0*   LEUKOCYTES UA  Large*   WBC UA /hpf Innumerable*   RBC UA /hpf 1-2   BACTERIA UA /hpf Occasional   EPITHELIAL CELLS WET PREP /hpf Occasional   MUCUS THREADS  Occasional*       Results from last 7 days   Lab Units 08/12/23 2123 08/12/23 2028 08/12/23 1951   BLOOD CULTURE   --  No Growth at 48 hrs. No Growth at 48 hrs.    URINE CULTURE  Culture too young- will reincubate  --   --        Medications:   Scheduled Medications:  acetylcysteine, 3 mL, Nebulization, TID  chlorhexidine, 15 mL, Mouth/Throat, Q12H BLAKE  enoxaparin, 30 mg, Subcutaneous, Q12H BLAKE  levalbuterol, 1.25 mg, Nebulization, TID  nicotine, 1 patch, Transdermal, Daily  pantoprazole, 40 mg, Intravenous, Q24H BLAKE  piperacillin-tazobactam, 3.375 g, Intravenous, Q6H  potassium chloride, 20 mEq, Intravenous, Q2H  potassium chloride, 20 mEq, Intravenous, Q2H    dextrose 50 % IV solution 25 mL  Dose: 25 mL  Freq: Once Route: IV  Indications of Use: HYPOGLYCEMIA  Start: 08/15/23 0600 End: 08/15/23 0618    Continuous IV Infusions:  HYDROmorphone, , Intravenous, Continuous      PRN Meds:  naloxone, 0.1 mg, Intravenous, Q3 min PRN  ondansetron, 4 mg, Intravenous, Q6H PRN  phenol, 1 spray, Mouth/Throat, Q2H PRN        Discharge Plan: [facility]    Network Utilization Review Department  ATTENTION: Please call with any questions or concerns to [phone] and carefully listen to the prompts so that you are directed to the right person. All voicemails are confidential.  Janae Esters all requests for admission clinical reviews, approved or denied determinations and any other requests to dedicated fax number below belonging to the campus where the patient is receiving treatment.  List of dedicated fax numbers for the Facilities:  [place] DENIALS (Administrative/Medical Necessity) [phone]   [facility] ([facility] [phone]   [address] [place] [address] [phone]   [facility] [address] [facility] [phone]   [facility] [address] [facility] [id number] [place] [address] [phone]   [address] [address] [place] [address] [phone]

## 2023-08-15 NOTE — CONSULTS
Pulmonary Consultation   Galilea Koch 70 y.o. female MRN: [id number]  Unit/Bed#: S -01 Encounter: [id number]      Reason for consultation: Hypoxic respiratory failure    Requesting provider: Shaila Marks PA-C    Impressions:   Acute hypoxic respiratory failure  · Patient has acute hypoxic respiratory failure  · Chest x-ray showed fluid overload and pulmonary edema  · Most likely in the setting of the above-mentioned    Pulmonary edema  · Patient received around 10.5 L of fluid, and had 7 L output so she is around 3.5 L positive  · Chest x-rays show fluid overload and pulmonary edema  · Patient has VBG's done on August 13 that initially showed pH of 7.248, and low bicarb at 19.9 but the repeat VBG showed normal pH of 7.329 and bicarb improved to 21. · Patient received 40 mg IV Lasix on 8/14      Recommendations: We will give IV Lasix 40 mg for now  Consider checking echo to get baseline cardiac function  Continue titrating oxygen as needed  Recheck CXR tomorrow  Will check BNP  Monitor I/Os      History of Present Illness   HPI:  Galilea Koch is a 70 y.o. female with PMH of hypertension, smoking history around 20-pack-year, who presented to the emergency department with abdominal pain, nausea, vomiting, fevers and chills, as well as shortness of breath. She was found to have perforated sigmoid diverticulitis requiring exploratory laparotomy and Ness's procedure. She was initially admitted to ICU as well. Patient's oxygen requirements went up, currently saturating in mid 90s on 10 L mid flow nasal cannula. Patient had chest x-ray done on 14th showing possible developing CHF and right basal infiltrate, chest x-ray from 13th showed mild pulmonary edema without any focal consolidation, initial chest x-ray from August 12 showed no acute pulmonary issues. Patient so far has been treated with IV Lasix total of 40 mg yesterday, he is on Xopenex inhalation.   She is on n.p.o., has NG tube, and has been on continuous fluids per chart review she is positive 3.49 liters. Most likely patient's respiratory failure is due to pulmonary edema. Another differentials include pneumonia but patient is on PIP Tazo, and remains afebrile. We do not have any previous echo. Review of systems:  12 point review of systems was completed and was otherwise negative except as listed in HPI. Historical Information   Past Medical History:   Diagnosis Date   • Hypertension      Past Surgical History:   Procedure Laterality Date   • APPENDECTOMY N/A 8/12/2023    Procedure: APPENDECTOMY;  Surgeon: Stephanie Panchal DO;  [facility];  Service: General   • CHOLECYSTECTOMY N/A 8/12/2023    Procedure: CHOLECYSTECTOMY;  Surgeon: Stephanie Panchal DO;  [facility];  Service: General   • HARTMANS PROCEDURE N/A 8/12/2023    Procedure: Felipe Karst;  Surgeon: Stephanie Panchal DO;  [facility];  Service: General   • LAPAROTOMY N/A 8/12/2023    Procedure: LAPAROTOMY EXPLORATORY, TAKEDOWN OF COLOVESICLE FISTULA, REPAIR OF EPIGASTRIC HERNIA;  Surgeon: Stephanie Panchal DO;  [facility];  Service: General     History reviewed. No pertinent family history. Social History: Smoker, current, trying to quit. More than 20 Pack year smoking history.     Meds/Allergies   Current Facility-Administered Medications   Medication Dose Route Frequency   • acetylcysteine (MUCOMYST) 200 mg/mL inhalation solution 600 mg  3 mL Nebulization TID   • chlorhexidine (PERIDEX) 0.12 % oral rinse 15 mL  15 mL Mouth/Throat Q12H [facility]   • enoxaparin (LOVENOX) subcutaneous injection 30 mg  30 mg Subcutaneous Q12H [facility]   • HYDROmorphone (DILAUDID) 1 mg/mL 50 mL PCA   Intravenous Continuous   • levalbuterol (XOPENEX) inhalation solution 1.25 mg  1.25 mg Nebulization TID   • naloxone (NARCAN) injection 0.1 mg  0.1 mg Intravenous Q3 min PRN   • nicotine (NICODERM CQ) 14 mg/24hr TD 24 hr patch 1 patch  1 patch Transdermal Daily   • ondansetron (ZOFRAN) injection 4 mg  4 mg Intravenous Q6H PRN   • pantoprazole (PROTONIX) injection 40 mg  40 mg Intravenous Q24H BLAKE   • phenol (CHLORASEPTIC) 1.4 % mucosal liquid 1 spray  1 spray Mouth/Throat Q2H PRN   • piperacillin-tazobactam (ZOSYN) 3.375 g in sodium chloride 0.9 % 100 mL IVPB  3.375 g Intravenous Q6H   • potassium chloride 20 mEq IVPB (premix)  20 mEq Intravenous Q2H     Medications Prior to Admission   Medication   • hydrochlorothiazide (HYDRODIURIL) 25 mg tablet   • hydrocortisone (ANUSOL-HC) 2.5 % rectal cream   • hydrocortisone (ANUSOL-HC) 25 mg suppository   • losartan (COZAAR) 50 mg tablet     Allergies   Allergen Reactions   • Acetaminophen Hives   • Penicillins Hives       Vitals: Blood pressure 133/83, pulse 103, temperature 97.8 °F (36.6 °C), resp. rate 18, height 4' 9" (1.448 m), weight 67.4 kg (148 lb 9.4 oz), SpO2 94 %., On 10 L MidfLow Body mass index is 32.15 kg/m².       Intake/Output Summary (Last 24 hours) at 8/15/2023 1456  Last data filed at 8/15/2023 1400  Gross per 24 hour   Intake 747.87 ml   Output 4640 ml   Net -3892.13 ml       Physical Exam  General: Lying comfortably in the bed, Awake alert and oriented x 3, conversant without conversational dyspnea, NAD, normal affect  HEENT:  PERRL, Sclera noninjected, nonicteric OU, Nares patent, no nasal flaring, no nasal drainage, Mucous membranes, moist, no oral lesions, normal dentition  NECK: Trachea midline, no accessory muscle use, no stridor, no cervical or supraclavicular adenopathy, JVP not elevated  CARDIAC: Reg, single s1/S2, no m/r/g  PULM: Bilateral crackles, no wheezes  CHEST: No gross deformities, equal chest expansion on inspiration bilaterally  ABD: Normoactive bowel sounds, soft nontender, nondistended, no rebound, no rigidity, no guarding  EXT: No cyanosis, no clubbing, no edema, normal capillary refill  SKIN:  No rashes, no lesions  NEURO: no focal neurologic deficits, AAOx3, moving all extremities appropriately    Labs: I have personally reviewed pertinent lab results. Results from last 7 days   Lab Units 08/15/23  0508 08/14/23  0619 08/13/23  1459 08/12/23  2149 08/12/23  1722   WBC Thousand/uL 11.76* 14.38* 13.61*   < > 17.72*   HEMOGLOBIN g/dL 9.7* 9.5* 9.2*   < > 13.2   HEMATOCRIT % 29.9* 28.8* 28.4*   < > 40.7   PLATELETS Thousands/uL 327 311 298   < > 473*   NEUTROS PCT % 88*  --   --   --   --    MONOS PCT % 4  --   --   --   --    MONO PCT %  --   --   --   --  1*   EOS PCT % 0  --   --   --  0    < > = values in this interval not displayed. Results from last 7 days   Lab Units 08/15/23  1213 08/15/23  0508 08/14/23  0619 08/13/23  1551 08/13/23  0351 08/12/23  1722   POTASSIUM mmol/L 3.9 3.0* 3.3* 5.5* 3.2* 3.3*   CHLORIDE mmol/L 108 106 111* 112* 111* 102   CO2 mmol/L 24 24 21 20* 19* 23   BUN mg/dL 21 20 21 22 26* 27*   CREATININE mg/dL 0.61 0.67 0.69 0.73 0.88 1.06   CALCIUM mg/dL 7.7* 7.5* 7.9* 7.7* 7.1* 9.1   ALK PHOS U/L  --   --   --  88 106* 186*   ALT U/L  --   --   --  13 14 7   AST U/L  --   --   --  34 37 10*     Results from last 7 days   Lab Units 08/15/23  0508 08/14/23  0619 08/13/23  0351   MAGNESIUM mg/dL 1.9 2.3 1.7*     Results from last 7 days   Lab Units 08/15/23  0508 08/14/23  0619 08/13/23  0351   PHOSPHORUS mg/dL 3.3 3.3 5.6*      Results from last 7 days   Lab Units 08/12/23  1951   INR  1.23*   PTT seconds 31     Results from last 7 days   Lab Units 08/13/23  0351   LACTIC ACID mmol/L 1.0     No results found for: "TROPONINI"    Imaging and other studies: I have personally reviewed pertinent reports. Pulmonary function testing:I have personally reviewed pertinent reports. EKG, Pathology, and Other Studies: I have personally reviewed pertinent reports.       Code Status: Level 1 - Full Code

## 2023-08-15 NOTE — PROGRESS NOTES
General Surgery  Progress Note   Mignon Share 70 y.o. 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 90 to 91% on 10 to 14 L flow nasal cannula overnight. UOP: 3.12  NG tube: 760 cc bilious (no documentation for overnight shift)  Right lower quadrant HENRI 1: 90 cc serosang  Right lower quadrant HENRI 2: 190 cc serosang  Colostomy: minimal serosang in bag, no stool or gas. Stoma pink    WBC 11 from 14  Hgb 9.7 from 9.5  Cr 0.67    Plan:  • Continue NPO/NG tube  • Continue IV fluids while n.p.o.  • Monitor colostomy for output   • Continue IV Zosyn   • Await return of bowel function  • Continue murray x 2 weeks   • Hypoglycemia protocol  • Replete electrolytes as needed  • DVT ppx: Lovenox  • Pain/ nausea control PRN  • OOB/ ambulation as appropriate  • PT/OT  • Incentive Spirometry      Subjective/Objective     Subjective:   Patient seen and examined at bedside, in no acute distress. Yesterday patient was found to be tachypneic and hypoxic; critical care consult was obtained and patient was diuresed with adequate response in terms of urine output; did continue to require mid flow nasal cannula to support her oxygen saturation. This morning blood glucose on BMP was noted to be 65; patient was given D5 for correction. Patient's pain is well controlled. She denies nausea or vomiting. And out of chair yesterday, plans to ambulate more today. Objective:   Vitals:Blood pressure 138/89, pulse (!) 108, temperature 98.2 °F (36.8 °C), temperature source Oral, resp. rate 18, height 4' 9" (1.448 m), weight 67.4 kg (148 lb 9.4 oz), SpO2 (!) 89 %.   Temp (24hrs), Av.9 °F (36.6 °C), Min:97.7 °F (36.5 °C), Max:98.2 °F (36.8 °C)        Intake/Output Summary (Last 24 hours) at 8/15/2023 0831  Last data filed at 8/15/2023 0700  Gross per 24 hour   Intake 747.67 ml   Output 4140 ml   Net -3392.33 ml       Invasive Devices     Peripheral Intravenous Line  Duration           Peripheral IV 08/12/23 Left;Proximal;Ventral (anterior) Forearm 2 days    Peripheral IV 08/12/23 Proximal;Right;Ventral (anterior) Antecubital 2 days    Peripheral IV 08/13/23 Dorsal (posterior); Left Forearm 1 day          Drain  Duration           Closed/Suction Drain Right RLQ Bulb 19 Fr. 2 days    Closed/Suction Drain Right RLQ Bulb 19 Fr. 2 days    Colostomy LUQ 2 days    NG/OG/Enteral Tube Nasogastric 18 Fr Right nare 2 days    Urethral Catheter Latex; Double-lumen 16 Fr. 2 days                Physical Exam:  General: No acute distress, alert and oriented. Comfortable in bed. HEENT: NGT  CV: Well perfused, regular rate and rhythm  Lungs: Normal work of breathing, no increased respiratory effort  Abdomen: Soft, appropriately tender, moderately distended; incision clean dry and intact, with serosang output.  Stoma pink/healthy  Extremities: No edema, clubbing or cyanosis  Skin: Warm, dry    Lab Results:   BMP/CMP:   Lab Results   Component Value Date    SODIUM 141 08/15/2023    K 3.0 (L) 08/15/2023     08/15/2023    CO2 24 08/15/2023    BUN 20 08/15/2023    CREATININE 0.67 08/15/2023    CALCIUM 7.5 (L) 08/15/2023    EGFR 92 08/15/2023    and CBC:   Lab Results   Component Value Date    WBC 11.76 (H) 08/15/2023    HGB 9.7 (L) 08/15/2023    HCT 29.9 (L) 08/15/2023    MCV 78 (L) 08/15/2023     08/15/2023    RBC 3.86 08/15/2023    MCH 25.1 (L) 08/15/2023    MCHC 32.4 08/15/2023    RDW 18.2 (H) 08/15/2023    MPV 10.0 08/15/2023    NRBC 0 08/15/2023     VTE Prophylaxis: Sequential compression device (Venodyne)  and Enoxaparin (Lovenox)    Nikko Rm, MD  8/15/2023

## 2023-08-16 ENCOUNTER — APPOINTMENT (INPATIENT)
Dept: RADIOLOGY | Facility: HOSPITAL | Age: 63
DRG: 853 | End: 2023-08-16
Payer: COMMERCIAL

## 2023-08-16 LAB
ANION GAP SERPL CALCULATED.3IONS-SCNC: 11 MMOL/L
BASOPHILS # BLD AUTO: 0.02 THOUSANDS/ÂΜL (ref 0–0.1)
BASOPHILS NFR BLD AUTO: 0 % (ref 0–1)
BUN SERPL-MCNC: 17 MG/DL (ref 5–25)
CALCIUM SERPL-MCNC: 7.6 MG/DL (ref 8.4–10.2)
CHLORIDE SERPL-SCNC: 101 MMOL/L (ref 96–108)
CO2 SERPL-SCNC: 28 MMOL/L (ref 21–32)
CREAT SERPL-MCNC: 0.58 MG/DL (ref 0.6–1.3)
EOSINOPHIL # BLD AUTO: 0.04 THOUSAND/ÂΜL (ref 0–0.61)
EOSINOPHIL NFR BLD AUTO: 0 % (ref 0–6)
ERYTHROCYTE [DISTWIDTH] IN BLOOD BY AUTOMATED COUNT: 17.9 % (ref 11.6–15.1)
GFR SERPL CREATININE-BSD FRML MDRD: 97 ML/MIN/1.73SQ M
GLUCOSE SERPL-MCNC: 104 MG/DL (ref 65–140)
GLUCOSE SERPL-MCNC: 122 MG/DL (ref 65–140)
GLUCOSE SERPL-MCNC: 86 MG/DL (ref 65–140)
GLUCOSE SERPL-MCNC: 89 MG/DL (ref 65–140)
HCT VFR BLD AUTO: 32.6 % (ref 34.8–46.1)
HGB BLD-MCNC: 10.7 G/DL (ref 11.5–15.4)
IMM GRANULOCYTES # BLD AUTO: 0.11 THOUSAND/UL (ref 0–0.2)
IMM GRANULOCYTES NFR BLD AUTO: 1 % (ref 0–2)
LYMPHOCYTES # BLD AUTO: 0.87 THOUSANDS/ÂΜL (ref 0.6–4.47)
LYMPHOCYTES NFR BLD AUTO: 8 % (ref 14–44)
MAGNESIUM SERPL-MCNC: 1.9 MG/DL (ref 1.9–2.7)
MCH RBC QN AUTO: 25.7 PG (ref 26.8–34.3)
MCHC RBC AUTO-ENTMCNC: 32.8 G/DL (ref 31.4–37.4)
MCV RBC AUTO: 78 FL (ref 82–98)
MONOCYTES # BLD AUTO: 0.68 THOUSAND/ÂΜL (ref 0.17–1.22)
MONOCYTES NFR BLD AUTO: 6 % (ref 4–12)
NEUTROPHILS # BLD AUTO: 9.05 THOUSANDS/ÂΜL (ref 1.85–7.62)
NEUTS SEG NFR BLD AUTO: 85 % (ref 43–75)
NRBC BLD AUTO-RTO: 0 /100 WBCS
PHOSPHATE SERPL-MCNC: 3.1 MG/DL (ref 2.3–4.1)
PLATELET # BLD AUTO: 347 THOUSANDS/UL (ref 149–390)
PMV BLD AUTO: 10.2 FL (ref 8.9–12.7)
POTASSIUM SERPL-SCNC: 3.1 MMOL/L (ref 3.5–5.3)
RBC # BLD AUTO: 4.17 MILLION/UL (ref 3.81–5.12)
SODIUM SERPL-SCNC: 140 MMOL/L (ref 135–147)
WBC # BLD AUTO: 10.77 THOUSAND/UL (ref 4.31–10.16)

## 2023-08-16 PROCEDURE — 85025 COMPLETE CBC W/AUTO DIFF WBC: CPT | Performed by: PHYSICIAN ASSISTANT

## 2023-08-16 PROCEDURE — 84100 ASSAY OF PHOSPHORUS: CPT | Performed by: PHYSICIAN ASSISTANT

## 2023-08-16 PROCEDURE — C9113 INJ PANTOPRAZOLE SODIUM, VIA: HCPCS | Performed by: PHYSICIAN ASSISTANT

## 2023-08-16 PROCEDURE — 99232 SBSQ HOSP IP/OBS MODERATE 35: CPT | Performed by: STUDENT IN AN ORGANIZED HEALTH CARE EDUCATION/TRAINING PROGRAM

## 2023-08-16 PROCEDURE — 94640 AIRWAY INHALATION TREATMENT: CPT

## 2023-08-16 PROCEDURE — 88302 TISSUE EXAM BY PATHOLOGIST: CPT | Performed by: PATHOLOGY

## 2023-08-16 PROCEDURE — 82948 REAGENT STRIP/BLOOD GLUCOSE: CPT

## 2023-08-16 PROCEDURE — 88307 TISSUE EXAM BY PATHOLOGIST: CPT | Performed by: PATHOLOGY

## 2023-08-16 PROCEDURE — 99024 POSTOP FOLLOW-UP VISIT: CPT | Performed by: SURGERY

## 2023-08-16 PROCEDURE — 94003 VENT MGMT INPAT SUBQ DAY: CPT

## 2023-08-16 PROCEDURE — 83735 ASSAY OF MAGNESIUM: CPT | Performed by: PHYSICIAN ASSISTANT

## 2023-08-16 PROCEDURE — 94668 MNPJ CHEST WALL SBSQ: CPT

## 2023-08-16 PROCEDURE — 94760 N-INVAS EAR/PLS OXIMETRY 1: CPT

## 2023-08-16 PROCEDURE — 80048 BASIC METABOLIC PNL TOTAL CA: CPT | Performed by: PHYSICIAN ASSISTANT

## 2023-08-16 PROCEDURE — 88304 TISSUE EXAM BY PATHOLOGIST: CPT | Performed by: PATHOLOGY

## 2023-08-16 PROCEDURE — 71045 X-RAY EXAM CHEST 1 VIEW: CPT

## 2023-08-16 RX ORDER — POTASSIUM CHLORIDE 14.9 MG/ML
20 INJECTION INTRAVENOUS
Status: COMPLETED | OUTPATIENT
Start: 2023-08-16 | End: 2023-08-16

## 2023-08-16 RX ORDER — FUROSEMIDE 10 MG/ML
20 INJECTION INTRAMUSCULAR; INTRAVENOUS ONCE
Status: COMPLETED | OUTPATIENT
Start: 2023-08-16 | End: 2023-08-16

## 2023-08-16 RX ORDER — MAGNESIUM SULFATE HEPTAHYDRATE 40 MG/ML
2 INJECTION, SOLUTION INTRAVENOUS ONCE
Status: COMPLETED | OUTPATIENT
Start: 2023-08-16 | End: 2023-08-16

## 2023-08-16 RX ADMIN — CHLORHEXIDINE GLUCONATE 15 ML: 1.2 SOLUTION ORAL at 08:23

## 2023-08-16 RX ADMIN — POTASSIUM CHLORIDE 20 MEQ: 14.9 INJECTION, SOLUTION INTRAVENOUS at 14:48

## 2023-08-16 RX ADMIN — POTASSIUM CHLORIDE 20 MEQ: 14.9 INJECTION, SOLUTION INTRAVENOUS at 11:52

## 2023-08-16 RX ADMIN — LEVALBUTEROL HYDROCHLORIDE 1.25 MG: 1.25 SOLUTION RESPIRATORY (INHALATION) at 13:40

## 2023-08-16 RX ADMIN — ENOXAPARIN SODIUM 30 MG: 30 INJECTION SUBCUTANEOUS at 21:04

## 2023-08-16 RX ADMIN — CHLORHEXIDINE GLUCONATE 15 ML: 1.2 SOLUTION ORAL at 21:04

## 2023-08-16 RX ADMIN — PIPERACILLIN AND TAZOBACTAM 3.38 G: 36; 4.5 INJECTION, POWDER, FOR SOLUTION INTRAVENOUS at 17:57

## 2023-08-16 RX ADMIN — LEVALBUTEROL HYDROCHLORIDE 1.25 MG: 1.25 SOLUTION RESPIRATORY (INHALATION) at 20:48

## 2023-08-16 RX ADMIN — FUROSEMIDE 20 MG: 10 INJECTION, SOLUTION INTRAMUSCULAR; INTRAVENOUS at 17:58

## 2023-08-16 RX ADMIN — PIPERACILLIN AND TAZOBACTAM 3.38 G: 36; 4.5 INJECTION, POWDER, FOR SOLUTION INTRAVENOUS at 21:05

## 2023-08-16 RX ADMIN — ACETYLCYSTEINE 600 MG: 200 SOLUTION ORAL; RESPIRATORY (INHALATION) at 07:20

## 2023-08-16 RX ADMIN — PANTOPRAZOLE SODIUM 40 MG: 40 INJECTION, POWDER, FOR SOLUTION INTRAVENOUS at 08:24

## 2023-08-16 RX ADMIN — ENOXAPARIN SODIUM 30 MG: 30 INJECTION SUBCUTANEOUS at 08:23

## 2023-08-16 RX ADMIN — MAGNESIUM SULFATE HEPTAHYDRATE 2 G: 40 INJECTION, SOLUTION INTRAVENOUS at 11:52

## 2023-08-16 RX ADMIN — ACETYLCYSTEINE 600 MG: 200 SOLUTION ORAL; RESPIRATORY (INHALATION) at 13:40

## 2023-08-16 RX ADMIN — ACETYLCYSTEINE 600 MG: 200 SOLUTION ORAL; RESPIRATORY (INHALATION) at 20:48

## 2023-08-16 RX ADMIN — NICOTINE 1 PATCH: 14 PATCH, EXTENDED RELEASE TRANSDERMAL at 08:22

## 2023-08-16 RX ADMIN — PIPERACILLIN AND TAZOBACTAM 3.38 G: 36; 4.5 INJECTION, POWDER, FOR SOLUTION INTRAVENOUS at 04:19

## 2023-08-16 RX ADMIN — LEVALBUTEROL HYDROCHLORIDE 1.25 MG: 1.25 SOLUTION RESPIRATORY (INHALATION) at 07:20

## 2023-08-16 RX ADMIN — PIPERACILLIN AND TAZOBACTAM 3.38 G: 36; 4.5 INJECTION, POWDER, FOR SOLUTION INTRAVENOUS at 09:11

## 2023-08-16 NOTE — PLAN OF CARE
Problem: Potential for Falls  Goal: Patient will remain free of falls  Description: INTERVENTIONS:  - Educate patient/family on patient safety including physical limitations  - Instruct patient to call for assistance with activity   - Consult OT/PT to assist with strengthening/mobility   - Keep Call bell within reach  - Keep bed low and locked with side rails adjusted as appropriate  - Keep care items and personal belongings within reach  - Initiate and maintain comfort rounds  - Make Fall Risk Sign visible to staff  - Offer Toileting every  Hours, in advance of need  - Initiate/Maintain alarm  - Obtain necessary fall risk management equipment:   - Apply yellow socks and bracelet for high fall risk patients  - Consider moving patient to room near nurses station  Outcome: Progressing     Problem: MOBILITY - ADULT  Goal: Maintain or return to baseline ADL function  Description: INTERVENTIONS:  -  Assess patient's ability to carry out ADLs; assess patient's baseline for ADL function and identify physical deficits which impact ability to perform ADLs (bathing, care of mouth/teeth, toileting, grooming, dressing, etc.)  - Assess/evaluate cause of self-care deficits   - Assess range of motion  - Assess patient's mobility; develop plan if impaired  - Assess patient's need for assistive devices and provide as appropriate  - Encourage maximum independence but intervene and supervise when necessary  - Involve family in performance of ADLs  - Assess for home care needs following discharge   - Consider OT consult to assist with ADL evaluation and planning for discharge  - Provide patient education as appropriate  Outcome: Progressing  Goal: Maintains/Returns to pre admission functional level  Description: INTERVENTIONS:  - Perform BMAT or MOVE assessment daily.   - Set and communicate daily mobility goal to care team and patient/family/caregiver.    - Collaborate with rehabilitation services on mobility goals if consulted  - Perform Range of Motion  times a day. - Reposition patient every  hours.   - Dangle patient  times a day  - Stand patient  times a day  - Ambulate patient  times a day  - Out of bed to chair  times a day   - Out of bed for meals  times a day  - Out of bed for toileting  - Record patient progress and toleration of activity level   Outcome: Progressing     Problem: Prexisting or High Potential for Compromised Skin Integrity  Goal: Skin integrity is maintained or improved  Description: INTERVENTIONS:  - Identify patients at risk for skin breakdown  - Assess and monitor skin integrity  - Assess and monitor nutrition and hydration status  - Monitor labs   - Assess for incontinence   - Turn and reposition patient  - Assist with mobility/ambulation  - Relieve pressure over bony prominences  - Avoid friction and shearing  - Provide appropriate hygiene as needed including keeping skin clean and dry  - Evaluate need for skin moisturizer/barrier cream  - Collaborate with interdisciplinary team   - Patient/family teaching  - Consider wound care consult   Outcome: Progressing     Problem: GASTROINTESTINAL - ADULT  Goal: Minimal or absence of nausea and/or vomiting  Description: INTERVENTIONS:  - Administer IV fluids if ordered to ensure adequate hydration  - Maintain NPO status until nausea and vomiting are resolved  - Nasogastric tube if ordered  - Administer ordered antiemetic medications as needed  - Provide nonpharmacologic comfort measures as appropriate  - Advance diet as tolerated, if ordered  - Consider nutrition services referral to assist patient with adequate nutrition and appropriate food choices  Outcome: Progressing  Goal: Maintains or returns to baseline bowel function  Description: INTERVENTIONS:  - Assess bowel function  - Encourage oral fluids to ensure adequate hydration  - Administer IV fluids if ordered to ensure adequate hydration  - Administer ordered medications as needed  - Encourage mobilization and activity  - Consider nutritional services referral to assist patient with adequate nutrition and appropriate food choices  Outcome: Progressing  Goal: Maintains adequate nutritional intake  Description: INTERVENTIONS:  - Monitor percentage of each meal consumed  - Identify factors contributing to decreased intake, treat as appropriate  - Assist with meals as needed  - Monitor I&O, weight, and lab values if indicated  - Obtain nutrition services referral as needed  Outcome: Progressing  Goal: Establish and maintain optimal ostomy function  Description: INTERVENTIONS:  - Assess bowel function  - Encourage oral fluids to ensure adequate hydration  - Administer IV fluids if ordered to ensure adequate hydration   - Administer ordered medications as needed  - Encourage mobilization and activity  - Nutrition services referral to assist patient with appropriate food choices  - Assess stoma site  - Consider wound care consult   Outcome: Progressing  Goal: Oral mucous membranes remain intact  Description: INTERVENTIONS  - Assess oral mucosa and hygiene practices  - Implement preventative oral hygiene regimen  - Implement oral medicated treatments as ordered  - Initiate Nutrition services referral as needed  Outcome: Progressing     Problem: GENITOURINARY - ADULT  Goal: Urinary catheter remains patent  Description: INTERVENTIONS:  - Assess patency of urinary catheter  - If patient has a chronic murray, consider changing catheter if non-functioning  - Follow guidelines for intermittent irrigation of non-functioning urinary catheter  Outcome: Progressing     Problem: SKIN/TISSUE INTEGRITY - ADULT  Goal: Skin Integrity remains intact(Skin Breakdown Prevention)  Description: Assess:  -Perform Alexis assessment every   -Clean and moisturize skin every   -Inspect skin when repositioning, toileting, and assisting with ADLS  -Assess under medical devices such as  every   -Assess extremities for adequate circulation and sensation Bed Management:  -Have minimal linens on bed & keep smooth, unwrinkled  -Change linens as needed when moist or perspiring  -Avoid sitting or lying in one position for more than  hours while in bed  -Keep HOB at degrees     Toileting:  -Offer bedside commode  -Assess for incontinence every   -Use incontinent care products after each incontinent episode such as     Activity:  -Mobilize patient  times a day  -Encourage activity and walks on unit  -Encourage or provide ROM exercises   -Turn and reposition patient every  Hours  -Use appropriate equipment to lift or move patient in bed  -Instruct/ Assist with weight shifting every  when out of bed in chair  -Consider limitation of chair time  hour intervals    Skin Care:  -Avoid use of baby powder, tape, friction and shearing, hot water or constrictive clothing  -Relieve pressure over bony prominences using   -Do not massage red bony areas    Next Steps:  -Teach patient strategies to minimize risks such as    -Consider consults to  interdisciplinary teams such as   Outcome: Progressing  Goal: Incision(s), wounds(s) or drain site(s) healing without S/S of infection  Description: INTERVENTIONS  - Assess and document dressing, incision, wound bed, drain sites and surrounding tissue  - Provide patient and family education  - Perform skin care/dressing changes every  Outcome: Progressing     Problem: Nutrition/Hydration-ADULT  Goal: Nutrient/Hydration intake appropriate for improving, restoring or maintaining nutritional needs  Description: Monitor and assess patient's nutrition/hydration status for malnutrition. Collaborate with interdisciplinary team and initiate plan and interventions as ordered. Monitor patient's weight and dietary intake as ordered or per policy. Utilize nutrition screening tool and intervene as necessary. Determine patient's food preferences and provide high-protein, high-caloric foods as appropriate.      INTERVENTIONS:  - Monitor oral intake, urinary output, labs, and treatment plans  - Assess nutrition and hydration status and recommend course of action  - Evaluate amount of meals eaten  - Assist patient with eating if necessary   - Allow adequate time for meals  - Recommend/ encourage appropriate diets, oral nutritional supplements, and vitamin/mineral supplements  - Order, calculate, and assess calorie counts as needed  - Recommend, monitor, and adjust tube feedings and TPN/PPN based on assessed needs  - Assess need for intravenous fluids  - Provide specific nutrition/hydration education as appropriate  - Include patient/family/caregiver in decisions related to nutrition  Outcome: Progressing

## 2023-08-16 NOTE — PROGRESS NOTES
General Surgery  Progress Note   Asha Gutierrez 70 y.o. female MRN: [id number]  Unit/Bed#: S - Encounter: [id number]    Assessment:  75-year-old female with perforated diverticulitis complicated by colovesicular and colouterine fistula, also noted on CT scan to have a large gallstone, now status post  Ness's, fistula takedown, appendectomy, cholecystectomy and umbilical hernia repair. Acute hypoxic respiratory failure requiring nasal cannula oxygen in setting of tobacco use    Clamp trial performed yesterday with <100 cc removed with NGT reconnected to suction. NGT thus removed. Patient without nausea or vomiting overnight. Vital signs stable, afebrile. 92% on 5L [address]. UOP: 2.7 L  HENRI Drain x2: 61 cc/ 185 cc light serosang  Ostomy: 100 cc formed stool    WBC 10.77 (11.76)  Hgb 10.7 (9.7)  Cr 0.58 (0.61)    Plan:  • NPO with sips, slow diet advancement but can trial clears given functioning ostomy  • Continue IVF while on clears -can switch to maintenance IVF  • Monitor respiratory status, wean supplemental oxygen as able  • Appreciate pulmonology recommendations - suspect hypoxia 2/2 volume overload, s/p 40 mg IV lasix for diuresis   • Recommend repeating CXR this morning, will order now   • DVT ppx: Lovenox  • Pain/ nausea control PRN  • OOB/ ambulation  • Incentive Spirometry      Subjective/Objective     Subjective:   Patient seen and examined at bedside, in no acute distress. No acute events overnight. Patient's pain is well controlled. She/ He denies nausea or vomiting. Passing flatus and having bowel movements. Objective:   Vitals:Blood pressure 139/88, pulse 104, temperature 98.8 °F (37.1 °C), temperature source Oral, resp. rate 18, height 4' 9" (1.448 m), weight 67.4 kg (148 lb 9.4 oz), SpO2 91 %.   Temp (24hrs), Av.3 °F (36.8 °C), Min:97.6 °F (36.4 °C), Max:99 °F (37.2 °C)        Intake/Output Summary (Last 24 hours) at 2023 0636  Last data filed at 2023 1464  Gross per 24 hour   Intake 1204.73 ml   Output 3595 ml   Net -2390.27 ml       Invasive Devices     Peripheral Intravenous Line  Duration           Peripheral IV 08/12/23 Left;Proximal;Ventral (anterior) Forearm 3 days    Peripheral IV 08/13/23 Dorsal (posterior); Left Forearm 2 days    Peripheral IV 08/15/23 Distal;Right;Ventral (anterior) Forearm <1 day          Drain  Duration           Closed/Suction Drain Right RLQ Bulb 19 Fr. 3 days    Closed/Suction Drain Right RLQ Bulb 19 Fr. 3 days    Colostomy LUQ 3 days    Urethral Catheter Latex; Double-lumen 16 Fr. 3 days                Physical Exam:  General: No acute distress, alert and oriented  CV: Well perfused, regular rate and rhythm  Lungs: Normal work of breathing, no increased respiratory effort on 5L [address]  Abdomen: Soft, appropriately tender, non-distended. Incision clean, dry and intact.  Ostomy with formed stool in bag. HENRI drains with mostly serous output  Extremities: No edema, clubbing or cyanosis  Skin: Warm, dry    Lab Results:   BMP/CMP:   Lab Results   Component Value Date    SODIUM 140 08/16/2023    K 3.1 (L) 08/16/2023     08/16/2023    CO2 28 08/16/2023    BUN 17 08/16/2023    CREATININE 0.58 (L) 08/16/2023    CALCIUM 7.6 (L) 08/16/2023    EGFR 97 08/16/2023    and CBC:   Lab Results   Component Value Date    WBC 10.77 (H) 08/16/2023    HGB 10.7 (L) 08/16/2023    HCT 32.6 (L) 08/16/2023    MCV 78 (L) 08/16/2023     08/16/2023    RBC 4.17 08/16/2023    MCH 25.7 (L) 08/16/2023    MCHC 32.8 08/16/2023    RDW 17.9 (H) 08/16/2023    MPV 10.2 08/16/2023    NRBC 0 08/16/2023     VTE Prophylaxis: Sequential compression device (Venodyne)  and Enoxaparin (Lovenox)    Avila Fried MD  8/16/2023

## 2023-08-16 NOTE — PROGRESS NOTES
Progress Note - Pulmonary   Asha Seashore 70 y.o. female MRN: [id number]  Unit/Bed#: S -Sandeep Encouter:[id number]    Assessment/Plan:    Acute hypoxic respiratory failure  • Patient has acute hypoxic respiratory failure  • Chest x-ray showed fluid overload and pulmonary edema  • Most likely in the setting of the above-mentioned     Pulmonary edema  • Initial Chest x-rays show fluid overload and pulmonary edema  • Patient has VBG's done on August 13 that initially showed pH of 7.248, and low bicarb at 19.9 but the repeat VBG showed normal pH of 7.329 and bicarb improved to 21. • Patient received 40 mg IV Lasix for 2 days and had good response, was negative 3.3 Ls on 8/14 and negative 2.3 Ls on 8/15  • CXR from 8/16 shows improvement  • Most likely volume overload after procedural fluid resuscitation      Recommendations:  Consider checking echo to get baseline cardiac function (Inpt vs Opt)  Continue titrating oxygen as needed for SpO2>90  No more need for further IV diuretics  Monitor I/Os  Continue IS    Will sign off, reach out for further questions    Will discuss final plan with the attending    Chief Complaint:    "I feel fine"    Subjective:    Patient says that she feels well and denies any breathing-related symptoms bothering her. Says that she had cough before but that's improved and she is not coughing anymore. Suzanne Lor that she is using incentive spirometry. Objective:    Vitals: Blood pressure 113/81, pulse (!) 111, temperature 98.3 °F (36.8 °C), resp. rate 18, height 4' 9" (1.448 m), weight 68.9 kg (152 lb), SpO2 (!) 89 %. 5LNC Body mass index is 32.89 kg/m².       Intake/Output Summary (Last 24 hours) at 8/16/2023 0853  Last data filed at 8/16/2023 0801  Gross per 24 hour   Intake 1204.73 ml   Output 3596 ml   Net -2391.27 ml       Invasive Devices     Peripheral Intravenous Line  Duration           Peripheral IV 08/12/23 Left;Proximal;Ventral (anterior) Forearm 3 days    Peripheral IV 08/13/23 Dorsal (posterior); Left Forearm 2 days    Peripheral IV 08/15/23 Distal;Right;Ventral (anterior) Forearm <1 day          Drain  Duration           Closed/Suction Drain Right RLQ Bulb 19 Fr. 3 days    Closed/Suction Drain Right RLQ Bulb 19 Fr. 3 days    Colostomy LUQ 3 days    Urethral Catheter Latex; Double-lumen 16 Fr. 3 days                Physical Exam:     Physical Exam  Vitals and nursing note reviewed. Constitutional:       General: She is not in acute distress. Appearance: She is well-developed. HENT:      Head: Normocephalic and atraumatic. Eyes:      Conjunctiva/sclera: Conjunctivae normal.   Cardiovascular:      Rate and Rhythm: Normal rate and regular rhythm. Heart sounds: No murmur heard. Pulmonary:      Effort: Pulmonary effort is normal. No respiratory distress. Breath sounds: Rales (Bibasilar, improved) present. Abdominal:      Palpations: Abdomen is soft. Tenderness: There is no abdominal tenderness. Musculoskeletal:         General: No swelling. Cervical back: Neck supple. Right lower leg: No edema. Left lower leg: No edema. Skin:     General: Skin is warm and dry. Capillary Refill: Capillary refill takes less than 2 seconds. Neurological:      Mental Status: She is alert. Psychiatric:         Mood and Affect: Mood normal.           Labs: I have personally reviewed pertinent lab results. Imaging and other studies: I have personally reviewed pertinent reports.

## 2023-08-16 NOTE — ED PROCEDURE NOTE
PROCEDURE  CriticalCare Time    Date/Time: 8/12/2023 9:00 PM    Performed by: Serjio John DO  Authorized by: Serjio John DO    Critical care provider statement:     Critical care time (minutes):  35    Critical care was necessary to treat or prevent imminent or life-threatening deterioration of the following conditions:  Sepsis    Critical care was time spent personally by me on the following activities:  Blood draw for specimens, obtaining history from patient or surrogate, development of treatment plan with patient or surrogate, discussions with consultants, discussions with primary provider, evaluation of patient's response to treatment, examination of patient, review of old charts, re-evaluation of patient's condition, ordering and review of radiographic studies, ordering and review of laboratory studies, ordering and performing treatments and interventions and interpretation of cardiac output measurements  Comments:      Saw patient in conjunction with the physician assistant. She was initially seen and evaluated by the physician assistant however due to concerns of how ill the patient was I was asked to come and see and evaluate the patient. Did not appear to be critically ill being hypotensive tachycardic and tachypneic. She had diffuse abdominal pain and tenderness to palpation as well as guarding and rebound. Her lab work Bethena Bones work-up was concerning with an elevated white count 20% bands. I contacted the surgical resident to see and evaluate the patient. I also called radiology to discuss the abnormalities on her CT scan. Patient was admitted to the surgical service and taken directly to the operating room. She was then admitted to the ICU please see their notes for details.          Serjio John DO  08/16/23 4748

## 2023-08-16 NOTE — WOUND OSTOMY CARE
Progress Note- Ostomy  Polly Mcmillan 70 y.o. female  [id number]  S -S -01        Assessment:  Colostomy educational handouts and supply catalog provided to patient--encouraged review of the materials before next session on . Instructed on surgery and how stoma was created, healthy characteristics of a stoma, and when to notify the physician. Instructed on daily care of ostomy pouch--emptying when 1/3 full of gas or stool, cleaning, showering with pouch on/off, changing pouch every 3-5 days or if leaking. Demonstrated pouch emptying/cleaning. Demonstrated pouching system change using one piece cut-to-fit pouch--removal of pouch with push/pull method, cleansing with warm water only, measuring stoma, cutting pouch barrier to appropriate size, application of skin prep, application of pouch, holding gentle pressure on pouch after application for best adherence. Patient participated in 100% of ostomy teaching, patient was able to recall step by step order of ostomy pouch change to this nurse. Instructed on how to obtain ostomy supplies. Ostomy supplies at bedside. Informed patient of  support/sample programs, patient is interested in sample program.    Ostomy Care:  1. Peel back pouch using push-pull method  2. Use warm water only to cleanse skin around the stoma (amber-stomal skin). 3. Make sure all adhesive residue is removed and skin is dry and not oily. 4. Measure stoma size using measuring guide and trace correct measurements onto back of pouch. 5. Then mold the backing of pouch out to correct shape/size. 6. Use 3M no sting barrier film to prep the skin around the stoma. 7. Place pouch over stoma and onto skin. 8. Use warmth of hand to apply gentle pressure to help backing of pouch to adhere well to skin. 9. Empty pouch when 1/3 full. 10. Change pouch every 3-4 days or if signs of leaking.   11. Will continue to follow patient while an inpatient for Ostomy teaching/education. Stoma:     Stoma is red and well budded with distal os measuring 1 3/4 inches, well connected to skin junction, peristomal skin intact. Soft brown effluent draining from stoma.               Christa PALMERN, RN, [facility]

## 2023-08-17 ENCOUNTER — TELEPHONE (OUTPATIENT)
Dept: OTHER | Facility: HOSPITAL | Age: 63
End: 2023-08-17

## 2023-08-17 LAB
ANION GAP SERPL CALCULATED.3IONS-SCNC: 10 MMOL/L
BACTERIA BLD CULT: NORMAL
BACTERIA BLD CULT: NORMAL
BASOPHILS # BLD AUTO: 0.02 THOUSANDS/ÂΜL (ref 0–0.1)
BASOPHILS NFR BLD AUTO: 0 % (ref 0–1)
BUN SERPL-MCNC: 24 MG/DL (ref 5–25)
CALCIUM SERPL-MCNC: 7.7 MG/DL (ref 8.4–10.2)
CHLORIDE SERPL-SCNC: 102 MMOL/L (ref 96–108)
CO2 SERPL-SCNC: 29 MMOL/L (ref 21–32)
CREAT SERPL-MCNC: 0.65 MG/DL (ref 0.6–1.3)
EOSINOPHIL # BLD AUTO: 0.07 THOUSAND/ÂΜL (ref 0–0.61)
EOSINOPHIL NFR BLD AUTO: 1 % (ref 0–6)
ERYTHROCYTE [DISTWIDTH] IN BLOOD BY AUTOMATED COUNT: 17.6 % (ref 11.6–15.1)
GFR SERPL CREATININE-BSD FRML MDRD: 93 ML/MIN/1.73SQ M
GLUCOSE SERPL-MCNC: 100 MG/DL (ref 65–140)
GLUCOSE SERPL-MCNC: 112 MG/DL (ref 65–140)
GLUCOSE SERPL-MCNC: 117 MG/DL (ref 65–140)
GLUCOSE SERPL-MCNC: 92 MG/DL (ref 65–140)
HCT VFR BLD AUTO: 31.6 % (ref 34.8–46.1)
HGB BLD-MCNC: 10 G/DL (ref 11.5–15.4)
IMM GRANULOCYTES # BLD AUTO: 0.19 THOUSAND/UL (ref 0–0.2)
IMM GRANULOCYTES NFR BLD AUTO: 2 % (ref 0–2)
LYMPHOCYTES # BLD AUTO: 0.85 THOUSANDS/ÂΜL (ref 0.6–4.47)
LYMPHOCYTES NFR BLD AUTO: 8 % (ref 14–44)
MAGNESIUM SERPL-MCNC: 2.1 MG/DL (ref 1.9–2.7)
MCH RBC QN AUTO: 24.9 PG (ref 26.8–34.3)
MCHC RBC AUTO-ENTMCNC: 31.6 G/DL (ref 31.4–37.4)
MCV RBC AUTO: 79 FL (ref 82–98)
MONOCYTES # BLD AUTO: 0.77 THOUSAND/ÂΜL (ref 0.17–1.22)
MONOCYTES NFR BLD AUTO: 7 % (ref 4–12)
NEUTROPHILS # BLD AUTO: 8.96 THOUSANDS/ÂΜL (ref 1.85–7.62)
NEUTS SEG NFR BLD AUTO: 82 % (ref 43–75)
NRBC BLD AUTO-RTO: 0 /100 WBCS
PHOSPHATE SERPL-MCNC: 3.5 MG/DL (ref 2.3–4.1)
PLATELET # BLD AUTO: 326 THOUSANDS/UL (ref 149–390)
PMV BLD AUTO: 10.5 FL (ref 8.9–12.7)
POTASSIUM SERPL-SCNC: 3.3 MMOL/L (ref 3.5–5.3)
RBC # BLD AUTO: 4.02 MILLION/UL (ref 3.81–5.12)
SODIUM SERPL-SCNC: 141 MMOL/L (ref 135–147)
WBC # BLD AUTO: 10.86 THOUSAND/UL (ref 4.31–10.16)

## 2023-08-17 PROCEDURE — 97166 OT EVAL MOD COMPLEX 45 MIN: CPT

## 2023-08-17 PROCEDURE — 94664 DEMO&/EVAL PT USE INHALER: CPT

## 2023-08-17 PROCEDURE — 83735 ASSAY OF MAGNESIUM: CPT | Performed by: SURGERY

## 2023-08-17 PROCEDURE — 80048 BASIC METABOLIC PNL TOTAL CA: CPT | Performed by: SURGERY

## 2023-08-17 PROCEDURE — 94760 N-INVAS EAR/PLS OXIMETRY 1: CPT

## 2023-08-17 PROCEDURE — 94668 MNPJ CHEST WALL SBSQ: CPT

## 2023-08-17 PROCEDURE — 82948 REAGENT STRIP/BLOOD GLUCOSE: CPT

## 2023-08-17 PROCEDURE — 97116 GAIT TRAINING THERAPY: CPT

## 2023-08-17 PROCEDURE — 99024 POSTOP FOLLOW-UP VISIT: CPT | Performed by: SURGERY

## 2023-08-17 PROCEDURE — 84100 ASSAY OF PHOSPHORUS: CPT | Performed by: SURGERY

## 2023-08-17 PROCEDURE — 85025 COMPLETE CBC W/AUTO DIFF WBC: CPT | Performed by: SURGERY

## 2023-08-17 PROCEDURE — C9113 INJ PANTOPRAZOLE SODIUM, VIA: HCPCS | Performed by: PHYSICIAN ASSISTANT

## 2023-08-17 PROCEDURE — 94640 AIRWAY INHALATION TREATMENT: CPT

## 2023-08-17 RX ORDER — HYDROMORPHONE HCL IN WATER/PF 6 MG/30 ML
0.2 PATIENT CONTROLLED ANALGESIA SYRINGE INTRAVENOUS EVERY 4 HOURS PRN
Status: DISCONTINUED | OUTPATIENT
Start: 2023-08-17 | End: 2023-08-18

## 2023-08-17 RX ORDER — POTASSIUM CHLORIDE 20 MEQ/1
40 TABLET, EXTENDED RELEASE ORAL ONCE
Status: COMPLETED | OUTPATIENT
Start: 2023-08-17 | End: 2023-08-17

## 2023-08-17 RX ORDER — OXYCODONE HYDROCHLORIDE 5 MG/1
5 TABLET ORAL EVERY 4 HOURS PRN
Status: DISCONTINUED | OUTPATIENT
Start: 2023-08-17 | End: 2023-08-18

## 2023-08-17 RX ADMIN — PIPERACILLIN AND TAZOBACTAM 3.38 G: 36; 4.5 INJECTION, POWDER, FOR SOLUTION INTRAVENOUS at 04:24

## 2023-08-17 RX ADMIN — POTASSIUM CHLORIDE 40 MEQ: 1500 TABLET, EXTENDED RELEASE ORAL at 07:43

## 2023-08-17 RX ADMIN — PIPERACILLIN AND TAZOBACTAM 3.38 G: 36; 4.5 INJECTION, POWDER, FOR SOLUTION INTRAVENOUS at 09:32

## 2023-08-17 RX ADMIN — PIPERACILLIN AND TAZOBACTAM 3.38 G: 36; 4.5 INJECTION, POWDER, FOR SOLUTION INTRAVENOUS at 21:04

## 2023-08-17 RX ADMIN — LEVALBUTEROL HYDROCHLORIDE 1.25 MG: 1.25 SOLUTION RESPIRATORY (INHALATION) at 20:49

## 2023-08-17 RX ADMIN — LEVALBUTEROL HYDROCHLORIDE 1.25 MG: 1.25 SOLUTION RESPIRATORY (INHALATION) at 14:02

## 2023-08-17 RX ADMIN — ENOXAPARIN SODIUM 30 MG: 30 INJECTION SUBCUTANEOUS at 21:02

## 2023-08-17 RX ADMIN — PIPERACILLIN AND TAZOBACTAM 3.38 G: 36; 4.5 INJECTION, POWDER, FOR SOLUTION INTRAVENOUS at 16:54

## 2023-08-17 RX ADMIN — OXYCODONE HYDROCHLORIDE 5 MG: 5 TABLET ORAL at 20:58

## 2023-08-17 RX ADMIN — ENOXAPARIN SODIUM 30 MG: 30 INJECTION SUBCUTANEOUS at 08:06

## 2023-08-17 RX ADMIN — NICOTINE 1 PATCH: 14 PATCH, EXTENDED RELEASE TRANSDERMAL at 08:07

## 2023-08-17 RX ADMIN — ACETYLCYSTEINE 600 MG: 200 SOLUTION ORAL; RESPIRATORY (INHALATION) at 07:19

## 2023-08-17 RX ADMIN — PANTOPRAZOLE SODIUM 40 MG: 40 INJECTION, POWDER, FOR SOLUTION INTRAVENOUS at 08:07

## 2023-08-17 RX ADMIN — CHLORHEXIDINE GLUCONATE 15 ML: 1.2 SOLUTION ORAL at 21:03

## 2023-08-17 RX ADMIN — CHLORHEXIDINE GLUCONATE 15 ML: 1.2 SOLUTION ORAL at 08:06

## 2023-08-17 RX ADMIN — ACETYLCYSTEINE 600 MG: 200 SOLUTION ORAL; RESPIRATORY (INHALATION) at 14:02

## 2023-08-17 RX ADMIN — LEVALBUTEROL HYDROCHLORIDE 1.25 MG: 1.25 SOLUTION RESPIRATORY (INHALATION) at 07:19

## 2023-08-17 NOTE — PLAN OF CARE
Problem: OCCUPATIONAL THERAPY ADULT  Goal: Performs self-care activities at highest level of function for planned discharge setting. See evaluation for individualized goals. Description: Treatment Interventions: Energy conservation, Compensatory technique education, Equipment evaluation/education, Patient/family training, Endurance training  Equipment Recommended: Shower/Tub chair with back ($) (pt denies need for equipment)       See flowsheet documentation for full assessment, interventions and recommendations. Note: Limitation: Decreased high-level ADLs, Decreased endurance (balance, act omar, fxnl reach and standing omar)  Prognosis: Good  Assessment: Pt is a 70 y.o. female seen for OT evaluation s/p admit to [facility] on 8/12/2023 w/Perforated diverticulum. Prior to admission, pt was living with spouse in a 2 level house, (I) with ADLs, (I) with IADLs, (-) falls, (+) . Personal and environmental factors affecting patient at time of evaluation include difficulty completing IADLs. Personal factors supporting patient at time of evaluation include age, (I) PLOF, supportive family, attitude towards recovery and accessible home environment. Based upon this evaluation, pt is functioning below baseline. Pt will benefit from continued skilled inpatient OT to maximize safety, level of independence overall performance in ADLs, functional transfers, functional mobility to return to functional baseline/highest level of function.      OT Discharge Recommendation: No rehabilitation needs (increased social support for IADL completion)     Maida Triana, [address] [place] [address], OTR/L  [place] License [id number]  [address] [id number]

## 2023-08-17 NOTE — PLAN OF CARE
Problem: PHYSICAL THERAPY ADULT  Goal: Performs mobility at highest level of function for planned discharge setting. See evaluation for individualized goals. Description: Treatment/Interventions: Functional transfer training, LE strengthening/ROM, Therapeutic exercise, Cognitive reorientation, Endurance training, Patient/family training, Equipment eval/education, Bed mobility, Gait training, Spoke to nursing, Spoke to case management, OT  Equipment Recommended: Santosh Sandoval    See flowsheet documentation for full assessment, interventions and recommendations. Outcome: Progressing  Note: Prognosis: Good  Problem List: Decreased endurance, Impaired balance, Decreased mobility, Decreased skin integrity, Decreased safety awareness  Assessment: Pt seen for PT treatment 8/17/2023 with focus on: gait training. Pt presents seated OOB in recliner, reports 0/10 pain, and is agreeable and eager for participation in session. Pt participates in gait training, with intervention focusing on goal of improved independence with ambulation, improved activity/mobility endurance, and assessment of ambulation with LRAD. Pt completes multiple transfers t/o session at [facility] level. Pt ambulates 160ft with RW and S (SPO2 trialled on RA per clearance of RN however SPO2 dec to 83% RA and 2L O2 via NC reapplied and SPO2 then maintained 89%+). After functional seated rest break, pt then ambulates an additional 80ft with no AD and S (remained on 2L O2 via NC and SPO2 maintained 88%+). Overall pt displays significant functional improvement and is safe for // encouraged to ambulate multiple times / day with RN staffing or family supervision. Pt remains below her baseline level of functional mobility due to decreased endurance and impaired balance. At this time, pt to most appropriately benefit from return to home with increased social supports upon d/c from the acute care setting.  Next session, plan for intervention of increased ambulation training with no AD and elevations training as able and appropriate. Barriers to Discharge: None (pending progress on elevations)     PT Discharge Recommendation: No rehabilitation needs    See flowsheet documentation for full assessment.

## 2023-08-17 NOTE — PHYSICAL THERAPY NOTE
PHYSICAL THERAPY TREATMENT  DATE: 08/17/23  TIME: 7683-0823    NAME:  Michael Lopes  AGE:   70 y.o. 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]  Severe sepsis ([address]) [A41.9, R65.20]  Diverticulitis of large intestine with perforation and abscess [K57.20]    Past Medical History:   Diagnosis Date    Hypertension      Past Surgical History:   Procedure Laterality Date    APPENDECTOMY N/A 8/12/2023    Procedure: APPENDECTOMY;  Surgeon: Chanda Panchal DO;  [facility];  Service: General    CHOLECYSTECTOMY N/A 8/12/2023    Procedure: CHOLECYSTECTOMY;  Surgeon: Chanda Panchal DO;  [facility];  Service: General    HARTMANS PROCEDURE N/A 8/12/2023    Procedure: Therese Shepard;  Surgeon: Chanda Panchal DO;  [facility];  Service: General    LAPAROTOMY N/A 8/12/2023    Procedure: LAPAROTOMY EXPLORATORY, TAKEDOWN OF COLOVESICLE FISTULA, REPAIR OF EPIGASTRIC HERNIA;  Surgeon: Chanda Panchal DO;  [facility];  Service: General       Performed at least 2 patient identifiers during session: Name, ID bracelet, and Epic photo     08/17/23 1117   PT Last Visit   PT Visit Date 08/17/23   Note Type   Note Type Treatment   Pain Assessment   Pain Assessment Tool 0-10   Pain Score No Pain   Effect of Pain on Daily Activities n/a   Hospital Pain Intervention(s) Repositioned; Ambulation/increased activity   Multiple Pain Sites No   Restrictions/Precautions   Weight Bearing Precautions Per Order No   Other Precautions Multiple lines;Telemetry;O2;Fall Risk  (+2L O2 via NC, R HENRI drain x2, murray catheter)   General   Chart Reviewed Yes   Additional Pertinent History Pt admitted 8/12/23 with abdominal pain.  8/13/23 underwent hartmans procedure, laparotomy exploratory, takedown of colovesicle fistula, appendectomy, cholecystectomy, drainage of intra-abdominal abscess, repair of epigastric hernia (no mesh) - by Dr. Christie Javed. Response to Previous Treatment Patient with no complaints from previous session. Family/Caregiver Present No   Cognition   Overall Cognitive Status WFL   Arousal/Participation Alert; Cooperative   Attention Within functional limits   Orientation Level Oriented X4   Memory Within functional limits   Following Commands Follows all commands and directions without difficulty   Subjective   Subjective "I'm motivation. I'm going home tomorrow, the doctors might not know it yet."   Bed Mobility   Additional Comments Bed mobility NT on this date as pt presents and was left seated OOB in recliner chair at end of session. Transfers   Sit to Stand 6  Modified independent   Additional items Armrests   Stand to Sit 6  Modified independent   Additional items Armrests   Stand pivot 6  Modified independent   Additional items Increased time required  (RW vs no AD)   Additional Comments Pt able to complete transfers with no assistance, pt steady with all transfers. Demonstrates ability to complete x10 reps of mini-squats with no UE support, no physical assistance or instability. Ambulation/Elevation   Gait pattern Decreased foot clearance; Short stride; Step through pattern   Gait Assistance 5  Supervision   Additional items Assist x 1;Verbal cues   Assistive Device Rolling walker;None   Distance 160ft with RW and S; 80ft with no AD and S  (~4 minute functional seated rest break between trials; SPO2 monitored t/o, see below for details.)   Stair Management Assistance Not tested  (pt reports 1 SHANI her home and 8-9 steps with HR to 2nd floor bedroom)   Balance   Static Sitting Normal   Dynamic Sitting Good   Static Standing Good   Dynamic Standing Fair +   Ambulatory Fair +   Endurance Deficit   Endurance Deficit Yes   Endurance Deficit Description At baseline pt does not wear supplemental O2.  Cleared by ROSA [place] for trial of mobility on RA, however intermediate through first ambulation trial SPO2 noted to be at 83% and pt with moderate degree of SOB. 2L via NC reapplied and SPO2 maintained between 89% and 92% on 2L. Pt continues to have moderate degree of SOB. Attributes it to long h/o smoking. Activity Tolerance   Activity Tolerance Patient limited by fatigue; Other (Comment)  (SOB/BUTLER/O2 desat)   Medical Staff Made Aware Spoke with [facility] resident   Nurse Made Aware Spoke with RN BEREKET [facility] pre/post session   Assessment   Prognosis Good   Problem List Decreased endurance; Impaired balance;Decreased mobility; Decreased skin integrity; Decreased safety awareness   Assessment Pt seen for PT treatment 8/17/2023 with focus on: gait training. Pt presents seated OOB in recliner, reports 0/10 pain, and is agreeable and eager for participation in session. Pt participates in gait training, with intervention focusing on goal of improved independence with ambulation, improved activity/mobility endurance, and assessment of ambulation with LRAD. Pt completes multiple transfers t/o session at [facility] level. Pt ambulates 160ft with RW and S (SPO2 trialled on RA per clearance of RN however SPO2 dec to 83% RA and 2L O2 via NC reapplied and SPO2 then maintained 89%+). After functional seated rest break, pt then ambulates an additional 80ft with no AD and S (remained on 2L O2 via NC and SPO2 maintained 88%+). Overall pt displays significant functional improvement from previous session and is safe for // encouraged to ambulate multiple times / day in hallways with RN/PCA staffing or family supervision. Pt remains below her baseline level of functional mobility due to decreased endurance and impaired balance. At this time, pt to most appropriately benefit from return to home with increased social supports upon d/c from the acute care setting. Next session, plan for intervention of increased ambulation training with no AD and elevations training as able and appropriate.    Barriers to Discharge None  (pending progress on elevations)   Goals   Patient Goals "to go home tomorrow"   [facility] Expiration Date 08/24/23   Short Term Goal #1 In 10 days pt will be able to: 1. Demonstrate ability to perform all aspects of bed mobility independently to improve functional safety. 2. Perform functional transfers independently to facilitate safe return to previous living environment. 3.  Ambulate 150 ft with LRAD independently with stable vitals to improve safety with household distances and reduce fall risk. 4. Improve LE strength grades by 1 to increase ease of functional mobility with transfers and gait. 5. Pt will demonstrate improved balance by one grade in order to decrease risk of falls. 6. Climb 12 steps with 1 HR independently to simulate access to second floor of home. PT Treatment Day 1   Plan   Treatment/Interventions Elevations; Therapeutic exercise; Endurance training;Gait training;Bed mobility;Spoke to nursing;Spoke to case management;Spoke to advanced practitioner   Progress Improving as expected   PT Frequency 2-3x/wk   Recommendation   PT Discharge Recommendation No rehabilitation needs   AM-PAC Basic Mobility Inpatient   Turning in Flat Bed Without Bedrails 4   Lying on Back to Sitting on Edge of Flat Bed Without Bedrails 3   Moving Bed to Chair 4   Standing Up From Chair Using Arms 4   Walk in Room 3   Climb 3-5 Stairs With Railing 3   Basic Mobility Inpatient Raw Score 21   Basic Mobility Standardized Score 45.55   Highest Level Of Mobility   JH-HLM Goal 6: Walk 10 steps or more   JH-HLM Achieved 7: Walk 25 feet or more   Education   Education Provided Mobility training;Assistive device   Patient Explanation/teachback used;Demonstrates verbal understanding   End of Consult   Patient Position at End of Consult Bedside chair; All needs within reach     The patient's AM-PAC Basic Mobility Inpatient Short Form Raw Score is 21.  A Raw score of greater than 16 suggests the patient may benefit from discharge to home. Please also refer to the recommendation of the Physical Therapist for safe discharge planning.       Twila Pelaez, PT, DPT   Available via Medical Referral Source  NPI # [id number]  [place] License - [id number]  3/78/8888

## 2023-08-17 NOTE — PROGRESS NOTES
General Surgery  Progress Note   Nirmala Edmonds 70 y.o. female MRN: [id number]  Unit/Bed#: S -01 Encounter: [id number]    Assessment:  58-year-old female with perforated diverticulitis complicated by colovesicular and colouterine fistula, also noted on CT scan to have a large gallstone, now status post  Ness's, fistula takedown, appendectomy, cholecystectomy and umbilical hernia repair. Acute hypoxic respiratory failure requiring nasal cannula oxygen in setting of tobacco use      Vital signs stable, afebrile. 92% on 2 L nasal cannula    UOP: 1.6 L  HENRI Drain x2: 60 cc/150 cc light serosang  Ostomy: 100 cc     WBC 10 from 10  Hgb 9 from 10  Cr 0.65 from 0.58    Plan:  • Advance to clear/toast/crackers  • Maintenance IV fluids  • Monitor respiratory status, wean supplemental oxygen as able  • Appreciate pulmonology recommendations -diuresis as needed  • Resume home meds  • Discontinue PCA and start oral pain medications  • Consider downgrading to MedSurg  • DVT ppx: Lovenox  • Pain/ nausea control PRN  • OOB/ ambulation  • Incentive Spirometry      Subjective/Objective     Subjective:   Patient seen and examined at bedside, in no acute distress. No acute events overnight. Patient's pain is well controlled. She denies nausea and vomiting. Reports that she has had gas and stool from ostomy. Objective:   Vitals:Blood pressure 129/88, pulse 101, temperature 98.1 °F (36.7 °C), resp. rate 18, height 4' 9" (1.448 m), weight 68.9 kg (152 lb), SpO2 92 %.   Temp (24hrs), Av °F (36.7 °C), Min:97.7 °F (36.5 °C), Max:98.4 °F (36.9 °C)        Intake/Output Summary (Last 24 hours) at 2023 0819  Last data filed at 2023 4068  Gross per 24 hour   Intake 651.6 ml   Output 1890 ml   Net -1238.4 ml       Invasive Devices     Peripheral Intravenous Line  Duration           Peripheral IV 23 Proximal;Right;Ventral (anterior) Forearm <1 day          Drain  Duration           Closed/Suction Drain Right RLQ Bulb 19 Fr. 4 days    Closed/Suction Drain Right RLQ Bulb 19 Fr. 4 days    Colostomy LUQ 4 days    Urethral Catheter Latex; Double-lumen 16 Fr. 4 days                Physical Exam:  General: No acute distress, alert and oriented  CV: Well perfused, regular rate and rhythm  Lungs: Normal work of breathing, no increased respiratory effort on 5L [address]  Abdomen: Soft, appropriately tender, mildly distended; incision clean dry intact, HENRI drains in place as above  Extremities: No edema, clubbing or cyanosis  Skin: Warm, dry    Lab Results:   BMP/CMP:   Lab Results   Component Value Date    SODIUM 141 08/17/2023    K 3.3 (L) 08/17/2023     08/17/2023    CO2 29 08/17/2023    BUN 24 08/17/2023    CREATININE 0.65 08/17/2023    CALCIUM 7.7 (L) 08/17/2023    EGFR 93 08/17/2023    and CBC:   Lab Results   Component Value Date    WBC 10.86 (H) 08/17/2023    HGB 10.0 (L) 08/17/2023    HCT 31.6 (L) 08/17/2023    MCV 79 (L) 08/17/2023     08/17/2023    RBC 4.02 08/17/2023    MCH 24.9 (L) 08/17/2023    MCHC 31.6 08/17/2023    RDW 17.6 (H) 08/17/2023    MPV 10.5 08/17/2023    NRBC 0 08/17/2023     VTE Prophylaxis: Sequential compression device (Venodyne)  and Enoxaparin (Lovenox)    Satinder Wolf MD  8/17/2023

## 2023-08-17 NOTE — OCCUPATIONAL THERAPY NOTE
Occupational Therapy Evaluation     Patient Name: Blu Lynn  [id number] Date: 8/17/2023  Problem List  Principal Problem:    Perforated diverticulum  Active Problems:    Hypertension    Past Medical History  Past Medical History:   Diagnosis Date    Hypertension      Past Surgical History  Past Surgical History:   Procedure Laterality Date    APPENDECTOMY N/A 8/12/2023    Procedure: APPENDECTOMY;  Surgeon: Pelon Panchal DO;  [facility];  Service: General    CHOLECYSTECTOMY N/A 8/12/2023    Procedure: CHOLECYSTECTOMY;  Surgeon: Pelon Panchal DO;  [facility];  Service: General    HARTMANS PROCEDURE N/A 8/12/2023    Procedure: Ness Shutter;  Surgeon: Pelon Panchal DO;  [facility];  Service: General    LAPAROTOMY N/A 8/12/2023    Procedure: LAPAROTOMY EXPLORATORY, TAKEDOWN OF COLOVESICLE FISTULA, REPAIR OF EPIGASTRIC HERNIA;  Surgeon: Pelon Panchal DO;  [facility];  Service: General        08/17/23 1416   OT Last Visit   OT Visit Date 08/17/23   Note Type   Note type Evaluation   Pain Assessment   Pain Assessment Tool 0-10   Pain Score No Pain   Restrictions/Precautions   Weight Bearing Precautions Per Order No   Other Precautions Chair Alarm; Bed Alarm;Multiple lines;O2;Fall Risk   Home Living   Type of [address] [place] [address] Dr Two level;Performs ADLs on one level; Able to live on main level with bedroom/bathroom; Bed/bath upstairs;Stairs to enter with rails  (0 SHANI; full flight to bed/bath)   Bathroom Shower/Tub Walk-in shower   Bathroom Toilet Standard   Bathroom Equipment   (none per pt)   [address] Walker   Additional Comments First floor setup available   Prior Function   Level of [place] Independent with ADLs; Independent with functional mobility; Independent with IADLS   Lives With Spouse; Family  (foster children (21, 12, 12, 13))   Receives Help From Family   IADLs Independent with driving; Independent with meal prep; Independent with medication management   Falls in the last 6 months 0   Vocational Full time employment  (adult )   Comments Pt reports she is fully (I) PTA, 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 Active baseline   Lifestyle   Autonomy Pt lives with family in a 2 level house and is (I) with ADLs/IADLs PTA, no AD, (-) falls, (+)    Reciprocal Relationships Supportive spouse and family, son is CNA, friend has experience with ostomy care   Service to Others Ful-time employment working at an adult    Intrinsic Gratification Pt enjoys her work at the adult    General   Additional Pertinent History Pt admitted w/ perforated diverticulitis complicated by colovesicular and colouterine fistula, also noted on CT scan to have a large gallstone, now status post 8/13 Ness's, fistula takedown, appendectomy, cholecystectomy and umbilical hernia repair. Family/Caregiver Present Yes  (spouse and client present at beginning of session)   Subjective   Subjective "I was knocked down earlier with my diet, but I feel better now"   ADL   Eating Assistance 7  Independent   Eating Deficit Setup; Beverage management   Grooming Assistance 5  Supervision/Setup   UB Bathing Assistance 6  Modified Independent   LB Bathing Assistance 4  Minimal Assistance   UB Dressing Assistance 6  Modified independent   LB Dressing Assistance 4  Minimal Assistance   LB Dressing Deficit Setup;Supervision/safety; Increased time to complete; Thread RLE into underwear;Pull up over hips; Thread LLE into underwear  (sitting in recliner, (A) to thread over toes due to height of recliner)   Toileting Assistance  5  Supervision/Setup   Bed Mobility   Additional Comments Pt received in recliner upon arrival   Transfers   Sit to Stand 6  Modified independent   Additional items Armrests; Increased time required   Stand to Sit 6  Modified independent   Additional items Armrests; Increased time required   Additional Comments VC for safe hand placement   Functional Mobility   Functional Mobility 5  Supervision   Additional Comments Short community distance in hallway with no AD and (S), occasional lateral LOB able to self correct with support on furniture   Additional items   (no AD)   Balance   Static Sitting Normal   Dynamic Sitting Good   Static Standing Good   Dynamic Standing Fair +   Activity Tolerance   Activity Tolerance Patient limited by fatigue  (SOB)   Medical Staff Made Aware Spoke to PT [facility]   Nurse Made Aware yes, RN ok to see pt   RUE Assessment   RUE Assessment WFL   LUE Assessment   LUE Assessment WFL   Hand Function   Gross Motor Coordination Functional   Fine Motor Coordination Functional   Sensation   Light Touch No apparent deficits   Vision-Basic Assessment   Current Vision Wears glasses all the time   Cognition   Overall Cognitive Status [facility]   Arousal/Participation Alert; Cooperative   Attention Within functional limits   Orientation Level Oriented X4   Memory Within functional limits   Following Commands Follows all commands and directions without difficulty   Comments Pleasant and agreeable, good insight and safety   Assessment   Limitation Decreased high-level ADLs; Decreased endurance  (balance, act omar, fxnl reach and standing omar)   Prognosis Good   Assessment Pt is a 70 y.o. female seen for OT evaluation s/p admit to [facility] on 8/12/2023 w/Perforated diverticulum. Prior to admission, pt was living with spouse in a 2 level house, (I) with ADLs, (I) with IADLs, (-) falls, (+) . Personal and environmental factors affecting patient at time of evaluation include difficulty completing IADLs. Personal factors supporting patient at time of evaluation include age, (I) PLOF, supportive family, attitude towards recovery and accessible home environment. Based upon this evaluation, pt is functioning below baseline.  Pt will benefit from continued skilled inpatient OT to maximize safety, level of independence overall performance in ADLs, functional transfers, functional mobility to return to functional baseline/highest level of function. Goals   Patient Goals to go home and see my kids   LTG Time Frame 10-14   Long Term Goal #1 see goals below   Plan   Treatment Interventions Energy conservation; Compensatory technique education;Equipment evaluation/education;Patient/family training; Endurance training   Goal Expiration Date 08/27/23   OT Treatment Day 0   OT Frequency 2-3x/wk   Recommendation   OT Discharge Recommendation No rehabilitation needs  (increased social support for IADL completion)   Equipment Recommended Shower/Tub chair with back ($)  (pt denies need for equipment)   Additional Comments  The patient's raw score on the AM-PAC Daily Activity inpatient short form is 23, standardized score is 51.12, greater than 39.4. From an OT standpoint, patients at this level may benefit from d/c to No rehabilitation needs. -[facility] Daily Activity Inpatient   Lower Body Dressing 3   Bathing 4   Toileting 4   Upper Body Dressing 4   Grooming 4   Eating 4   Daily Activity Raw Score 23   Daily Activity Standardized Score (Calc for Raw Score >=11) 51.12   AM-PAC Applied Cognition Inpatient   Following a Speech/Presentation 4   Understanding Ordinary Conversation 4   Taking Medications 4   Remembering Where Things Are Placed or Put Away 4   Remembering List of 4-5 Errands 4   Taking Care of Complicated Tasks 4   Applied Cognition Raw Score 24   Applied Cognition Standardized Score 62.21   End of Consult   Patient Position at End of Consult Bedside chair; All needs within reach   Nurse Communication Nurse aware of consult     GOALS:    *Goals established to promote patient goal of to go home and see my kids:      *LB ADL with mod (I) using AE prn for inc'd independence with self care and decrease caregiver burden    *Increase stand tolerance x 10  m for inc'd tolerance with standing purposeful tasks including IADLs/meal prep    *Patient will verbalize 3 safety awareness/ principles to prevent falls in the home setting. *Patient will verbalize and demonstrate use of energy conservation/deep breathing techniques and work simplification skills during functional activities with no verbal cues. *Pt will demonstrate use of long handled AE during 100% of tx sessions for increased ADL safety and independence following D/C     *Patient will improve functional activity tolerance to 20 minutes of sustained functional tasks to increase participation in basic self-care and decrease assistance level.      [facility], OTD, OTR/L    [address] [place] [address] Road License [id number]  [address] [place] [address] [phone]

## 2023-08-17 NOTE — TELEPHONE ENCOUNTER
Si First is a 69 yo  status post 8/13 Ness's, fistula takedown, appendectomy, cholecystectomy and umbilical hernia repair. Patient has a murray since procedure.  Please schedule nursing visit in approximately 7-10 days for murray catheter removal. Cystogram prior to removal.

## 2023-08-17 NOTE — PLAN OF CARE
Problem: Potential for Falls  Goal: Patient will remain free of falls  Description: INTERVENTIONS:  - Educate patient/family on patient safety including physical limitations  - Instruct patient to call for assistance with activity   - Consult OT/PT to assist with strengthening/mobility   - Keep Call bell within reach  - Keep bed low and locked with side rails adjusted as appropriate  - Keep care items and personal belongings within reach  - Initiate and maintain comfort rounds  - Make Fall Risk Sign visible to staff  - Offer Toileting every  Hours, in advance of need  - Initiate/Maintain alarm  - Obtain necessary fall risk management equipment:   - Apply yellow socks and bracelet for high fall risk patients  - Consider moving patient to room near nurses station  Outcome: Progressing     Problem: MOBILITY - ADULT  Goal: Maintain or return to baseline ADL function  Description: INTERVENTIONS:  -  Assess patient's ability to carry out ADLs; assess patient's baseline for ADL function and identify physical deficits which impact ability to perform ADLs (bathing, care of mouth/teeth, toileting, grooming, dressing, etc.)  - Assess/evaluate cause of self-care deficits   - Assess range of motion  - Assess patient's mobility; develop plan if impaired  - Assess patient's need for assistive devices and provide as appropriate  - Encourage maximum independence but intervene and supervise when necessary  - Involve family in performance of ADLs  - Assess for home care needs following discharge   - Consider OT consult to assist with ADL evaluation and planning for discharge  - Provide patient education as appropriate  Outcome: Progressing  Goal: Maintains/Returns to pre admission functional level  Description: INTERVENTIONS:  - Perform BMAT or MOVE assessment daily.   - Set and communicate daily mobility goal to care team and patient/family/caregiver.    - Collaborate with rehabilitation services on mobility goals if consulted  - Perform Range of Motion  times a day. - Reposition patient every  hours.   - Dangle patient  times a day  - Stand patient  times a day  - Ambulate patient  times a day  - Out of bed to chair  times a day   - Out of bed for meals  times a day  - Out of bed for toileting  - Record patient progress and toleration of activity level   Outcome: Progressing     Problem: Prexisting or High Potential for Compromised Skin Integrity  Goal: Skin integrity is maintained or improved  Description: INTERVENTIONS:  - Identify patients at risk for skin breakdown  - Assess and monitor skin integrity  - Assess and monitor nutrition and hydration status  - Monitor labs   - Assess for incontinence   - Turn and reposition patient  - Assist with mobility/ambulation  - Relieve pressure over bony prominences  - Avoid friction and shearing  - Provide appropriate hygiene as needed including keeping skin clean and dry  - Evaluate need for skin moisturizer/barrier cream  - Collaborate with interdisciplinary team   - Patient/family teaching  - Consider wound care consult   Outcome: Progressing     Problem: GASTROINTESTINAL - ADULT  Goal: Minimal or absence of nausea and/or vomiting  Description: INTERVENTIONS:  - Administer IV fluids if ordered to ensure adequate hydration  - Maintain NPO status until nausea and vomiting are resolved  - Nasogastric tube if ordered  - Administer ordered antiemetic medications as needed  - Provide nonpharmacologic comfort measures as appropriate  - Advance diet as tolerated, if ordered  - Consider nutrition services referral to assist patient with adequate nutrition and appropriate food choices  Outcome: Progressing  Goal: Maintains or returns to baseline bowel function  Description: INTERVENTIONS:  - Assess bowel function  - Encourage oral fluids to ensure adequate hydration  - Administer IV fluids if ordered to ensure adequate hydration  - Administer ordered medications as needed  - Encourage mobilization and activity  - Consider nutritional services referral to assist patient with adequate nutrition and appropriate food choices  Outcome: Progressing  Goal: Maintains adequate nutritional intake  Description: INTERVENTIONS:  - Monitor percentage of each meal consumed  - Identify factors contributing to decreased intake, treat as appropriate  - Assist with meals as needed  - Monitor I&O, weight, and lab values if indicated  - Obtain nutrition services referral as needed  Outcome: Progressing  Goal: Establish and maintain optimal ostomy function  Description: INTERVENTIONS:  - Assess bowel function  - Encourage oral fluids to ensure adequate hydration  - Administer IV fluids if ordered to ensure adequate hydration   - Administer ordered medications as needed  - Encourage mobilization and activity  - Nutrition services referral to assist patient with appropriate food choices  - Assess stoma site  - Consider wound care consult   Outcome: Progressing  Goal: Oral mucous membranes remain intact  Description: INTERVENTIONS  - Assess oral mucosa and hygiene practices  - Implement preventative oral hygiene regimen  - Implement oral medicated treatments as ordered  - Initiate Nutrition services referral as needed  Outcome: Progressing     Problem: GENITOURINARY - ADULT  Goal: Urinary catheter remains patent  Description: INTERVENTIONS:  - Assess patency of urinary catheter  - If patient has a chronic murray, consider changing catheter if non-functioning  - Follow guidelines for intermittent irrigation of non-functioning urinary catheter  Outcome: Progressing     Problem: SKIN/TISSUE INTEGRITY - ADULT  Goal: Skin Integrity remains intact(Skin Breakdown Prevention)  Description: Assess:  -Perform Alexis assessment every   -Clean and moisturize skin every   -Inspect skin when repositioning, toileting, and assisting with ADLS  -Assess under medical devices such as  every   -Assess extremities for adequate circulation and sensation Bed Management:  -Have minimal linens on bed & keep smooth, unwrinkled  -Change linens as needed when moist or perspiring  -Avoid sitting or lying in one position for more than  hours while in bed  -Keep HOB at degrees     Toileting:  -Offer bedside commode  -Assess for incontinence every   -Use incontinent care products after each incontinent episode such as     Activity:  -Mobilize patient  times a day  -Encourage activity and walks on unit  -Encourage or provide ROM exercises   -Turn and reposition patient every  Hours  -Use appropriate equipment to lift or move patient in bed  -Instruct/ Assist with weight shifting every  when out of bed in chair  -Consider limitation of chair time  hour intervals    Skin Care:  -Avoid use of baby powder, tape, friction and shearing, hot water or constrictive clothing  -Relieve pressure over bony prominences using   -Do not massage red bony areas    Next Steps:  -Teach patient strategies to minimize risks such as    -Consider consults to  interdisciplinary teams such as   Outcome: Progressing  Goal: Incision(s), wounds(s) or drain site(s) healing without S/S of infection  Description: INTERVENTIONS  - Assess and document dressing, incision, wound bed, drain sites and surrounding tissue  - Provide patient and family education  - Perform skin care/dressing changes ev  Outcome: Progressing     Problem: Nutrition/Hydration-ADULT  Goal: Nutrient/Hydration intake appropriate for improving, restoring or maintaining nutritional needs  Description: Monitor and assess patient's nutrition/hydration status for malnutrition. Collaborate with interdisciplinary team and initiate plan and interventions as ordered. Monitor patient's weight and dietary intake as ordered or per policy. Utilize nutrition screening tool and intervene as necessary. Determine patient's food preferences and provide high-protein, high-caloric foods as appropriate.      INTERVENTIONS:  - Monitor oral intake, urinary output, labs, and treatment plans  - Assess nutrition and hydration status and recommend course of action  - Evaluate amount of meals eaten  - Assist patient with eating if necessary   - Allow adequate time for meals  - Recommend/ encourage appropriate diets, oral nutritional supplements, and vitamin/mineral supplements  - Order, calculate, and assess calorie counts as needed  - Recommend, monitor, and adjust tube feedings and TPN/PPN based on assessed needs  - Assess need for intravenous fluids  - Provide specific nutrition/hydration education as appropriate  - Include patient/family/caregiver in decisions related to nutrition  Outcome: Progressing

## 2023-08-17 NOTE — PROGRESS NOTES
General Surgery  Progress Note   Galilea Koch 70 y.o. 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 Sepsis (POA) resolved s/p IV above surgical intervention and IV antibiotics  Acute hypoxic respiratory failure requiring nasal cannula oxygen in setting of tobacco use and concern for fluid overload/pulmonary edema s/p IV lasix    Vital signs stable, afebrile. 92% on 2L NC    HENRI Drain x2: 50 cc/ 360 cc serous  Ostomy: 275 cc    WBC 9.72 (10.86)  Hgb 9.6 (10.0)  Cr 0.53 (0.65)    Plan:  • Advance to soft diet as tolerated  • Patient does not want opioid pain meds, will discontinue and start ibuprofen  • F/u urology consult  • Monitor respiratory status, wean supplemental oxygen as able  • Appreciate pulmonology recommendations   • DVT ppx: Lovenox  • Pain/ nausea control PRN  • OOB/ ambulation  • Incentive Spirometry  • PT/OT: no rehab needs      Subjective/Objective     Subjective:   Patient seen and examined at bedside, in no acute distress. No acute events overnight. Patient's pain is well controlled. She denies nausea or vomiting. Ostomy functioning. Objective:   Vitals:Blood pressure 142/80, pulse 101, temperature 98.1 °F (36.7 °C), resp. rate 18, height 4' 9" (1.448 m), weight 68.8 kg (151 lb 12.2 oz), SpO2 (!) 89 %.   Temp (24hrs), Av.2 °F (36.8 °C), Min:97.8 °F (36.6 °C), Max:98.6 °F (37 °C)        Intake/Output Summary (Last 24 hours) at 2023 0783  Last data filed at 2023 0618  Gross per 24 hour   Intake 200 ml   Output 1025 ml   Net -825 ml       Invasive Devices     Peripheral Intravenous Line  Duration           Peripheral IV 23 Proximal;Right;Ventral (anterior) Forearm 1 day Drain  Duration           Closed/Suction Drain Right RLQ Bulb 19 Fr. 5 days    Closed/Suction Drain Right RLQ Bulb 19 Fr. 5 days    Colostomy LUQ 5 days    Urethral Catheter Latex; Double-lumen 16 Fr. 5 days                Physical Exam:  General: No acute distress, alert and oriented  CV: Well perfused, regular rate and rhythm  Lungs: Normal work of breathing, no increased respiratory effort on 2L  Abdomen: Soft, non tender, non-distended. Incision clean, dry and intact.  Ostomy with formed stool in bag. HENRI drains with serous output  Extremities: No edema, clubbing or cyanosis  Skin: Warm, dry    Lab Results:   BMP/CMP:   Lab Results   Component Value Date    SODIUM 139 08/18/2023    K 3.4 (L) 08/18/2023     08/18/2023    CO2 27 08/18/2023    BUN 23 08/18/2023    CREATININE 0.53 (L) 08/18/2023    CALCIUM 7.5 (L) 08/18/2023    EGFR 99 08/18/2023    and CBC:   Lab Results   Component Value Date    WBC 9.73 08/18/2023    HGB 9.6 (L) 08/18/2023    HCT 30.3 (L) 08/18/2023    MCV 80 (L) 08/18/2023     08/18/2023    RBC 3.79 (L) 08/18/2023    MCH 25.3 (L) 08/18/2023    MCHC 31.7 08/18/2023    RDW 17.6 (H) 08/18/2023    MPV 10.7 08/18/2023     VTE Prophylaxis: Sequential compression device (Venodyne)  and Enoxaparin (Lovenox)    Nataliia Malin MD  8/18/2023

## 2023-08-18 ENCOUNTER — HOME HEALTH ADMISSION (OUTPATIENT)
Dept: HOME HEALTH SERVICES | Facility: HOME HEALTHCARE | Age: 63
End: 2023-08-18
Payer: COMMERCIAL

## 2023-08-18 VITALS
OXYGEN SATURATION: 96 % | HEIGHT: 57 IN | SYSTOLIC BLOOD PRESSURE: 113 MMHG | HEART RATE: 105 BPM | DIASTOLIC BLOOD PRESSURE: 75 MMHG | RESPIRATION RATE: 18 BRPM | BODY MASS INDEX: 32.74 KG/M2 | WEIGHT: 151.76 LBS | TEMPERATURE: 97.7 F

## 2023-08-18 LAB
ANION GAP SERPL CALCULATED.3IONS-SCNC: 9 MMOL/L
BASOPHILS # BLD MANUAL: 0 THOUSAND/UL (ref 0–0.1)
BASOPHILS NFR MAR MANUAL: 0 % (ref 0–1)
BUN SERPL-MCNC: 23 MG/DL (ref 5–25)
CALCIUM SERPL-MCNC: 7.5 MG/DL (ref 8.4–10.2)
CHLORIDE SERPL-SCNC: 103 MMOL/L (ref 96–108)
CO2 SERPL-SCNC: 27 MMOL/L (ref 21–32)
CREAT SERPL-MCNC: 0.53 MG/DL (ref 0.6–1.3)
DME PARACHUTE DELIVERY DATE ACTUAL: NORMAL
DME PARACHUTE DELIVERY DATE EXPECTED: NORMAL
DME PARACHUTE DELIVERY DATE REQUESTED: NORMAL
DME PARACHUTE ITEM DESCRIPTION: NORMAL
DME PARACHUTE ORDER STATUS: NORMAL
DME PARACHUTE SUPPLIER NAME: NORMAL
DME PARACHUTE SUPPLIER PHONE: NORMAL
EOSINOPHIL # BLD MANUAL: 0.1 THOUSAND/UL (ref 0–0.4)
EOSINOPHIL NFR BLD MANUAL: 1 % (ref 0–6)
ERYTHROCYTE [DISTWIDTH] IN BLOOD BY AUTOMATED COUNT: 17.6 % (ref 11.6–15.1)
GFR SERPL CREATININE-BSD FRML MDRD: 99 ML/MIN/1.73SQ M
GLUCOSE SERPL-MCNC: 111 MG/DL (ref 65–140)
GLUCOSE SERPL-MCNC: 79 MG/DL (ref 65–140)
GLUCOSE SERPL-MCNC: 94 MG/DL (ref 65–140)
GLUCOSE SERPL-MCNC: 99 MG/DL (ref 65–140)
HCT VFR BLD AUTO: 30.3 % (ref 34.8–46.1)
HGB BLD-MCNC: 9.6 G/DL (ref 11.5–15.4)
HYPERCHROMIA BLD QL SMEAR: PRESENT
LYMPHOCYTES # BLD AUTO: 1.26 THOUSAND/UL (ref 0.6–4.47)
LYMPHOCYTES # BLD AUTO: 13 % (ref 14–44)
MCH RBC QN AUTO: 25.3 PG (ref 26.8–34.3)
MCHC RBC AUTO-ENTMCNC: 31.7 G/DL (ref 31.4–37.4)
MCV RBC AUTO: 80 FL (ref 82–98)
MONOCYTES # BLD AUTO: 0.29 THOUSAND/UL (ref 0–1.22)
MONOCYTES NFR BLD: 3 % (ref 4–12)
NEUTROPHILS # BLD MANUAL: 8.08 THOUSAND/UL (ref 1.85–7.62)
NEUTS SEG NFR BLD AUTO: 83 % (ref 43–75)
PLATELET # BLD AUTO: 296 THOUSANDS/UL (ref 149–390)
PLATELET BLD QL SMEAR: ADEQUATE
PMV BLD AUTO: 10.7 FL (ref 8.9–12.7)
POTASSIUM SERPL-SCNC: 3.4 MMOL/L (ref 3.5–5.3)
RBC # BLD AUTO: 3.79 MILLION/UL (ref 3.81–5.12)
RBC MORPH BLD: PRESENT
SODIUM SERPL-SCNC: 139 MMOL/L (ref 135–147)
WBC # BLD AUTO: 9.73 THOUSAND/UL (ref 4.31–10.16)

## 2023-08-18 PROCEDURE — 99024 POSTOP FOLLOW-UP VISIT: CPT | Performed by: SURGERY

## 2023-08-18 PROCEDURE — 94761 N-INVAS EAR/PLS OXIMETRY MLT: CPT

## 2023-08-18 PROCEDURE — 97116 GAIT TRAINING THERAPY: CPT

## 2023-08-18 PROCEDURE — 99254 IP/OBS CNSLTJ NEW/EST MOD 60: CPT | Performed by: UROLOGY

## 2023-08-18 PROCEDURE — 85027 COMPLETE CBC AUTOMATED: CPT | Performed by: PHYSICIAN ASSISTANT

## 2023-08-18 PROCEDURE — 82948 REAGENT STRIP/BLOOD GLUCOSE: CPT

## 2023-08-18 PROCEDURE — 85007 BL SMEAR W/DIFF WBC COUNT: CPT | Performed by: PHYSICIAN ASSISTANT

## 2023-08-18 PROCEDURE — 94640 AIRWAY INHALATION TREATMENT: CPT

## 2023-08-18 PROCEDURE — 80048 BASIC METABOLIC PNL TOTAL CA: CPT | Performed by: PHYSICIAN ASSISTANT

## 2023-08-18 PROCEDURE — 94760 N-INVAS EAR/PLS OXIMETRY 1: CPT

## 2023-08-18 PROCEDURE — C9113 INJ PANTOPRAZOLE SODIUM, VIA: HCPCS | Performed by: PHYSICIAN ASSISTANT

## 2023-08-18 RX ORDER — POTASSIUM CHLORIDE 20 MEQ/1
40 TABLET, EXTENDED RELEASE ORAL ONCE
Status: COMPLETED | OUTPATIENT
Start: 2023-08-18 | End: 2023-08-18

## 2023-08-18 RX ORDER — IBUPROFEN 600 MG/1
600 TABLET ORAL EVERY 6 HOURS PRN
Status: DISCONTINUED | OUTPATIENT
Start: 2023-08-18 | End: 2023-08-18 | Stop reason: HOSPADM

## 2023-08-18 RX ADMIN — POTASSIUM CHLORIDE 40 MEQ: 1500 TABLET, EXTENDED RELEASE ORAL at 08:28

## 2023-08-18 RX ADMIN — IBUPROFEN 600 MG: 600 TABLET ORAL at 10:34

## 2023-08-18 RX ADMIN — IBUPROFEN 600 MG: 600 TABLET ORAL at 19:49

## 2023-08-18 RX ADMIN — LEVALBUTEROL HYDROCHLORIDE 1.25 MG: 1.25 SOLUTION RESPIRATORY (INHALATION) at 07:36

## 2023-08-18 RX ADMIN — PANTOPRAZOLE SODIUM 40 MG: 40 INJECTION, POWDER, FOR SOLUTION INTRAVENOUS at 08:28

## 2023-08-18 RX ADMIN — CHLORHEXIDINE GLUCONATE 15 ML: 1.2 SOLUTION ORAL at 08:28

## 2023-08-18 RX ADMIN — NICOTINE 1 PATCH: 14 PATCH, EXTENDED RELEASE TRANSDERMAL at 08:28

## 2023-08-18 RX ADMIN — ENOXAPARIN SODIUM 30 MG: 30 INJECTION SUBCUTANEOUS at 08:28

## 2023-08-18 NOTE — CONSULTS
Consult - Urology   [facility] 1960, 70 y.o. 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  - Patient has had Del Toro catheter since admission  - Progressing well from surgical standpoint. Cleared for discharge from surgical standpoint  - Maintain Del Toro catheter on discharge  - Office follow-up for trial of void     Subjective:   Adrianna Simpson is a 76-year-old female with possible history hypertension and smoking history who presented on 8/12/2023 with acute onset progressive abdominal pain. CT scan findings showing acute sigmoid diverticulitis with perforation causing multiple phlegmon fluid collections and pneumoperitoneum. Colovesicular fistula with diffuse bladder wall thickening. Status post 813 Ness's, fistula takedown, appendectomy, cholecystectomy and umbilical hernia repair. Patient with Del Toro catheter in place. Urology was consulted for Del Toro catheter management. Review of Systems   Constitutional: Negative for chills and fever. Respiratory: Negative for cough and shortness of breath. Cardiovascular: Negative for chest pain and palpitations. Gastrointestinal: Negative for abdominal pain and vomiting. Genitourinary: Negative for dysuria and hematuria. Musculoskeletal: Negative for arthralgias and back pain. Skin: Negative for color change and rash. Neurological: Negative for seizures and syncope. All other systems reviewed and are negative. Objective:  Vitals: Blood pressure 127/74, pulse 104, temperature 98.1 °F (36.7 °C), resp. rate 18, height 4' 9" (1.448 m), weight 68.8 kg (151 lb 12.2 oz), SpO2 100 %. ,Body mass index is 32.84 kg/m². Physical Exam  Vitals reviewed.    Constitutional:       General: She is not in acute distress. Appearance: Normal appearance. She is normal weight. She is not ill-appearing, toxic-appearing or diaphoretic. HENT:      Head: Normocephalic and atraumatic. Right Ear: External ear normal.      Left Ear: External ear normal.      Nose: Nose normal.      Mouth/Throat:      Mouth: Mucous membranes are moist.   Eyes:      General: No scleral icterus. Conjunctiva/sclera: Conjunctivae normal.   Cardiovascular:      Rate and Rhythm: Normal rate. Pulses: Normal pulses. Pulmonary:      Effort: Pulmonary effort is normal.   Abdominal:      General: Abdomen is flat. There is no distension. Palpations: Abdomen is soft. Tenderness: There is no abdominal tenderness. There is no guarding. Comments: Colostomy in place. Staples in place   Genitourinary:     Comments: Del Toro in place  Musculoskeletal:         General: Normal range of motion. Cervical back: Normal range of motion. Skin:     General: Skin is warm. Findings: No rash. Neurological:      General: No focal deficit present. Mental Status: She is alert and oriented to person, place, and time. Mental status is at baseline. Psychiatric:         Mood and Affect: Mood normal.         Behavior: Behavior normal.         Thought Content:  Thought content normal.         Judgment: Judgment normal.       Labs:  Recent Labs     08/16/23  0436 08/17/23  0431 08/18/23  0510   WBC 10.77* 10.86* 9.73     Recent Labs     08/16/23  0436 08/17/23  0431 08/18/23  0510   HGB 10.7* 10.0* 9.6*     Recent Labs     08/16/23  0436 08/17/23  0431 08/18/23  0510   CREATININE 0.58* 0.65 0.53*           History:  Social History     Socioeconomic History   • Marital status: /Civil Union     Spouse name: None   • Number of children: None   • Years of education: None   • Highest education level: None   Occupational History   • None   Tobacco Use   • Smoking status: Every Day     Packs/day: 1.00     Types: Cigarettes   • Smokeless tobacco: Never   Vaping Use   • Vaping Use: Never used   Substance and Sexual Activity   • Alcohol use: Never   • Drug use: Never   • Sexual activity: None   Other Topics Concern   • None   Social History Narrative   • None     Social Determinants of Health     Financial Resource Strain: Not on file   Food Insecurity: Not on file   Transportation Needs: Not on file   Physical Activity: Not on file   Stress: Not on file   Social Connections: Not on file   Intimate Partner Violence: Not on file   Housing Stability: Not on file     Past Medical History:   Diagnosis Date   • Hypertension      Past Surgical History:   Procedure Laterality Date   • APPENDECTOMY N/A 8/12/2023    Procedure: APPENDECTOMY;  Surgeon: Faustino Panchal DO;  [facility];  Service: General   • CHOLECYSTECTOMY N/A 8/12/2023    Procedure: CHOLECYSTECTOMY;  Surgeon: Faustino Panchal DO;  [facility];  Service: General   • HARTMANS PROCEDURE N/A 8/12/2023    Procedure: Manisha Fluke;  Surgeon: Faustino Panchal DO;  [facility];  Service: General   • LAPAROTOMY N/A 8/12/2023    Procedure: LAPAROTOMY EXPLORATORY, TAKEDOWN OF COLOVESICLE FISTULA, REPAIR OF EPIGASTRIC HERNIA;  Surgeon: Faustino Panchal DO;  [facility];  Service: General     History reviewed. No pertinent family history.     Hernandez Benton PA-C  Date: 8/18/2023 Time: 12:37 PM

## 2023-08-18 NOTE — INCIDENTAL FINDINGS
The following findings require follow up:  Radiographic finding   Finding: 1.3 x 1.1 cm left adrenal nodule   Follow up required: A dedicated adrenal protocol CT is recommended at this time to confirm benignity Per the Adrenal recommendation based on institutional consensus and Journal of [facility],  of Radiology 2017;14:[id number]     Follow up should be done within 6 month(s)    Please notify the following clinician to assist with the follow up:   FRANCISCA Ovalle

## 2023-08-18 NOTE — PLAN OF CARE
Problem: Potential for Falls  Goal: Patient will remain free of falls  Description: INTERVENTIONS:  - Educate patient/family on patient safety including physical limitations  - Instruct patient to call for assistance with activity   - Consult OT/PT to assist with strengthening/mobility   - Keep Call bell within reach  - Keep bed low and locked with side rails adjusted as appropriate  - Keep care items and personal belongings within reach  - Initiate and maintain comfort rounds  - Make Fall Risk Sign visible to staff  - Apply yellow socks and bracelet for high fall risk patients  - Consider moving patient to room near nurses station  Outcome: Progressing     Problem: MOBILITY - ADULT  Goal: Maintain or return to baseline ADL function  Description: INTERVENTIONS:  -  Assess patient's ability to carry out ADLs; assess patient's baseline for ADL function and identify physical deficits which impact ability to perform ADLs (bathing, care of mouth/teeth, toileting, grooming, dressing, etc.)  - Assess/evaluate cause of self-care deficits   - Assess range of motion  - Assess patient's mobility; develop plan if impaired  - Assess patient's need for assistive devices and provide as appropriate  - Encourage maximum independence but intervene and supervise when necessary  - Involve family in performance of ADLs  - Assess for home care needs following discharge   - Consider OT consult to assist with ADL evaluation and planning for discharge  - Provide patient education as appropriate  Outcome: Progressing  Goal: Maintains/Returns to pre admission functional level  Description: INTERVENTIONS:  - Perform BMAT or MOVE assessment daily.   - Set and communicate daily mobility goal to care team and patient/family/caregiver.    - Collaborate with rehabilitation services on mobility goals if consulted  - Out of bed for toileting  - Record patient progress and toleration of activity level   Outcome: Progressing     Problem: Prexisting or High Potential for Compromised Skin Integrity  Goal: Skin integrity is maintained or improved  Description: INTERVENTIONS:  - Identify patients at risk for skin breakdown  - Assess and monitor skin integrity  - Assess and monitor nutrition and hydration status  - Monitor labs   - Assess for incontinence   - Turn and reposition patient  - Assist with mobility/ambulation  - Relieve pressure over bony prominences  - Avoid friction and shearing  - Provide appropriate hygiene as needed including keeping skin clean and dry  - Evaluate need for skin moisturizer/barrier cream  - Collaborate with interdisciplinary team   - Patient/family teaching  - Consider wound care consult   Outcome: Progressing     Problem: GASTROINTESTINAL - ADULT  Goal: Minimal or absence of nausea and/or vomiting  Description: INTERVENTIONS:  - Administer IV fluids if ordered to ensure adequate hydration  - Maintain NPO status until nausea and vomiting are resolved  - Nasogastric tube if ordered  - Administer ordered antiemetic medications as needed  - Provide nonpharmacologic comfort measures as appropriate  - Advance diet as tolerated, if ordered  - Consider nutrition services referral to assist patient with adequate nutrition and appropriate food choices  Outcome: Progressing  Goal: Maintains or returns to baseline bowel function  Description: INTERVENTIONS:  - Assess bowel function  - Encourage oral fluids to ensure adequate hydration  - Administer IV fluids if ordered to ensure adequate hydration  - Administer ordered medications as needed  - Encourage mobilization and activity  - Consider nutritional services referral to assist patient with adequate nutrition and appropriate food choices  Outcome: Progressing  Goal: Maintains adequate nutritional intake  Description: INTERVENTIONS:  - Monitor percentage of each meal consumed  - Identify factors contributing to decreased intake, treat as appropriate  - Assist with meals as needed  - Monitor I&O, weight, and lab values if indicated  - Obtain nutrition services referral as needed  Outcome: Progressing  Goal: Establish and maintain optimal ostomy function  Description: INTERVENTIONS:  - Assess bowel function  - Encourage oral fluids to ensure adequate hydration  - Administer IV fluids if ordered to ensure adequate hydration   - Administer ordered medications as needed  - Encourage mobilization and activity  - Nutrition services referral to assist patient with appropriate food choices  - Assess stoma site  - Consider wound care consult   Outcome: Progressing  Goal: Oral mucous membranes remain intact  Description: INTERVENTIONS  - Assess oral mucosa and hygiene practices  - Implement preventative oral hygiene regimen  - Implement oral medicated treatments as ordered  - Initiate Nutrition services referral as needed  Outcome: Progressing     Problem: GENITOURINARY - ADULT  Goal: Urinary catheter remains patent  Description: INTERVENTIONS:  - Assess patency of urinary catheter  - If patient has a chronic murray, consider changing catheter if non-functioning  - Follow guidelines for intermittent irrigation of non-functioning urinary catheter  Outcome: Progressing     Problem: SKIN/TISSUE INTEGRITY - ADULT  Goal: Skin Integrity remains intact(Skin Breakdown Prevention)  Description: Assess:  -Assess extremities for adequate circulation and sensation     Bed Management:  -Have minimal linens on bed & keep smooth, unwrinkled  -Change linens as needed when moist or perspiring    Toileting:  -Offer bedside commode    Activity:  -Encourage activity and walks on unit  -Encourage or provide ROM exercises   -Use appropriate equipment to lift or move patient in bed    Skin Care:  -Avoid use of baby powder, tape, friction and shearing, hot water or constrictive clothing  -Do not massage red bony areas    Outcome: Progressing  Goal: Incision(s), wounds(s) or drain site(s) healing without S/S of infection  Description: INTERVENTIONS  - Assess and document dressing, incision, wound bed, drain sites and surrounding tissue  - Provide patient and family education  Outcome: Progressing     Problem: Nutrition/Hydration-ADULT  Goal: Nutrient/Hydration intake appropriate for improving, restoring or maintaining nutritional needs  Description: Monitor and assess patient's nutrition/hydration status for malnutrition. Collaborate with interdisciplinary team and initiate plan and interventions as ordered. Monitor patient's weight and dietary intake as ordered or per policy. Utilize nutrition screening tool and intervene as necessary. Determine patient's food preferences and provide high-protein, high-caloric foods as appropriate.      INTERVENTIONS:  - Monitor oral intake, urinary output, labs, and treatment plans  - Assess nutrition and hydration status and recommend course of action  - Evaluate amount of meals eaten  - Assist patient with eating if necessary   - Allow adequate time for meals  - Recommend/ encourage appropriate diets, oral nutritional supplements, and vitamin/mineral supplements  - Order, calculate, and assess calorie counts as needed  - Recommend, monitor, and adjust tube feedings and TPN/PPN based on assessed needs  - Assess need for intravenous fluids  - Provide specific nutrition/hydration education as appropriate  - Include patient/family/caregiver in decisions related to nutrition  Outcome: Progressing

## 2023-08-18 NOTE — WOUND OSTOMY CARE
Progress Note - Wound   Polly Mcmillan 70 y.o. female MRN: [id number]  Unit/Bed#: S -01 Encounter: [id number]      Assessment: This is a follow up ostomy teaching visit for this 70year old female patient admitted on 8/12/23 with perforated diverticulum with abscess and colovesical fistula. She had Hartmanns Procedure with takedown of colovesical fistula, appendectomy, cholecystectomy and repair of epigastric hernia done on 8/13/23. The patient was AAO x 3 sitting in reclining chair and agreeable to have ostomy teaching continued. There was a one piece Coloplast cut to fit pouch in place that was just changed this a.m. - wafer was clean, dry & intact. The patient applied gloves and demonstrated the correct procedure in emptying ostomy pouch with 50 cc of brown liquid stool emptied into graduated cannister. She cleaned inside of the pouch opening and the outside of pouch with warm water and plain wipes then correctly reapplied the velcro securement tabs. At this time, the dietitian came into room and gave patient a Yuriy Galvez book regarding diet and she reviewed it briefly with patient. Diet also reviewed by this wound/ostomy RN and patient asked appropriate questions. The patient has supplies and reference materials to be read and to be taken home.     Ostomy Care:  1. Peel back pouch using push-pull method  2. Use warm water only to cleanse skin around the stoma (amber-stomal skin). 3. Make sure all adhesive residue is removed and skin is dry and not oily. 4. Measure stoma size using measuring guide and trace correct measurements onto back of pouch. 5. Then mold the backing of pouch out to correct shape/size. 6. Use 3M no sting barrier film to peristomal site if there is skin erosion. 7. Place pouch over stoma and onto skin. 8. Use warmth of hand to apply gentle pressure to help backing of pouch to adhere well to skin. 9. Empty pouch when 1/3 full.   10. Change pouch every 3-4 days or if signs of leaking. Discussed assessment findings, and plan of care/recommendations with William RN. Ostomy care and teaching will follow along with patient throughout admission, please call or tiger text with questions and concerns.     Connie Bourgeois MSN, RN, Bernard Esqueda

## 2023-08-18 NOTE — PHYSICAL THERAPY NOTE
Physical Therapy Cancellation Note         08/18/23 1010   Note Type   Note Type Cancelled Session   Cancel Reasons Refusal  (PT treatment attempt, cleared by RN Angeline Blizzard. Pt politely declines participation in PT at this time, reports having a busy morning & wishes to spend time w/ visitors now, however remains motivated to participate later in day.  Will f/u later in day as able.)       Dayanara Woodward, PT, DPT   Available via [employer]  NP # [phone]  [place] License - [id number]  3/90/6253

## 2023-08-18 NOTE — PHYSICAL THERAPY NOTE
PHYSICAL THERAPY TREATMENT  DATE: 08/18/23  TIME: 5619-5332    NAME:  Vivian Buckner  AGE:   70 y.o. Mrn:   [id number]  Length Of Stay: 6    ADMIT DX:  Pneumoperitoneum [K66.8]  Hepatic cyst [K76.89]  Cholelithiasis [K80.20]  Abdominal pain [R10.9]  Colovesical fistula [N32.1]  Severe sepsis ([address]) [A41.9, R65.20]  Diverticulitis of large intestine with perforation and abscess [K57.20]    Past Medical History:   Diagnosis Date   • Hypertension      Past Surgical History:   Procedure Laterality Date   • APPENDECTOMY N/A 8/12/2023    Procedure: APPENDECTOMY;  Surgeon: Nico Panchal DO;  [facility];  Service: General   • CHOLECYSTECTOMY N/A 8/12/2023    Procedure: CHOLECYSTECTOMY;  Surgeon: Nico Panchal DO;  [facility];  Service: General   • HARTMANS PROCEDURE N/A 8/12/2023    Procedure: Valetta Brisker;  Surgeon: Nico Panchal DO;  [facility];  Service: General   • LAPAROTOMY N/A 8/12/2023    Procedure: LAPAROTOMY EXPLORATORY, TAKEDOWN OF COLOVESICLE FISTULA, REPAIR OF EPIGASTRIC HERNIA;  Surgeon: Nico Panchal DO;  [facility];  Service: General       Performed at least 2 patient identifiers during session: Name, ID bracelet, and Epic photo     08/18/23 1313   PT Last Visit   PT Visit Date 08/18/23   Note Type   Note Type Treatment   Pain Assessment   Pain Assessment Tool 0-10   Pain Score No Pain   Restrictions/Precautions   Weight Bearing Precautions Per Order No   General   Chart Reviewed Yes   Additional Pertinent History Pt admitted 8/12/23 with abdominal pain. 8/13/23 underwent hartmans procedure, laparotomy exploratory, takedown of colovesicle fistula, appendectomy, cholecystectomy, drainage of intra-abdominal abscess, repair of epigastric hernia (no mesh) - by Dr. Dominga Tobin. Response to Previous Treatment Patient with no complaints from previous session.    Family/Caregiver Present No   Cognition   Overall Cognitive Status [facility] Arousal/Participation Alert; Cooperative   Attention Within functional limits   Orientation Level Oriented X4   Memory Within functional limits   Following Commands Follows all commands and directions without difficulty   Subjective   Subjective "They just sat me in the chair this morning but then haven't helped me with anything else."   Self Selected Walking Speed   SSWS Trial 1 (Seconds) 7.59 Seconds   SSWS Trial 2 (Seconds) 7.48 Seconds   SSWS Trial 3 (Seconds) 8.2 Seconds   SSWS Average Time (Seconds) 7.8 seconds   SSWS Average Score (m/sec) 0.7 m/sec   Bed Mobility   Additional Comments Bed mobility NT on this date as pt presents and was left seated OOB in recliner chair. Transfers   Sit to Stand 6  Modified independent   Additional items Armrests   Stand to Sit 6  Modified independent   Additional items Armrests   Stand pivot 6  Modified independent   Additional items Increased time required  (no AD)   Additional Comments Pt completes ~4 minutes total of unsupported static and dynamic standing tasks prior to ambulation training. No overt LOB or instability. Ambulation/Elevation   Gait pattern Decreased foot clearance; Short stride;Decreased hip extension;Decreased heel strike; Step through pattern   Gait Assistance 5  Supervision   Additional items Assist x 1;Verbal cues   Assistive Device None   Distance 180ft x1  (x3 brief standing rest breaks due to BUTLER, SPO2 maintained stable)   Stair Management Assistance 5  Supervision   Additional items Assist x 1;Verbal cues; Increased time required   Stair Management Technique One rail R;Foreward;Nonreciprocal   Number of Stairs 4   Ambulation/Elevation Additional Comments ~3 minute functional standing rest break pre/post elevations training due to SOB/BUTLER, however SPO2 monitored and stable on 6L via NC, HR increasing to 130s bpm but returns to low 100s with functional rest.   Balance   Static Sitting Normal   Dynamic Sitting Good   Static Standing Good   Dynamic Standing Fair +   Ambulatory Fair   Endurance Deficit   Endurance Deficit Yes   Endurance Deficit Description Per CRT note: pt requires 4L O2 at rest and 6L O2 with exertion. Pt with moderate degree of BUTLER with mobility efforts, pt self reports as "a little". SPo2 maintained stable t/o session, HR increasing to 130s bpm.   Activity Tolerance   Activity Tolerance Patient limited by fatigue; Other (Comment)  (SOB/BUTLER)   Medical Staff Made Aware Spoke with CM, CRT, urology PAAndresC   Nurse Made Aware Spoke with RN William   Assessment   Prognosis Good   Problem List Decreased endurance; Impaired balance;Decreased mobility; Decreased skin integrity; Decreased strength   Assessment Pt seen for PT treatment 8/18/2023 with focus on: gait training and elevations training. Per EMR: pt to require home O2 upon d/c, therefore education on O2 management with mobility also completed. Pt presents seated OOB in recliner, reports 0/10 pain, and is agreeable to participate in session. Pt participates in gait training, with intervention focusing on goal of improved independence, stability, and endurance. Pt is able to independently complete transfers, ambulates 180ft with no AD and S (self management of O2 tank  USP), and negotiation of 4 steps up/down with u/l HR and S. Pt displays a gait speed of 0.7m/s, qualifying pt as a limited community ambulator. Pt continues to require frequent but brief standing rest breaks due to SOB/BUTLER, however SPO2 monitored and stable t/o session. At end of session, pt was left seated OOB in recliner chair, with all needs in reach. Pt continues to most appropriately benefit from return to home with increased family and social supports upon d/c from the acute care setting. Continue skilled PT POC as able and appropriate in order to progress towards therapy goals and maximize independence with functional mobility.  Next session, plan for intervention of pt ambulation in room with O2 tubing extension/self management to simulate her return to home with home O2. Barriers to Discharge Other (Comment)  (increased education needed for O2 lines/tubing management with mobility, as pt now requiring supplemental O2 on d/c.)   Goals   Patient Goals "to go home today"   [facility] Expiration Date 08/24/23   Short Term Goal #1 In 10 days pt will be able to: 1. Demonstrate ability to perform all aspects of bed mobility independently to improve functional safety. 2. Perform functional transfers independently to facilitate safe return to previous living environment. 3.  Ambulate 150 ft with LRAD independently with stable vitals to improve safety with household distances and reduce fall risk. 4. Improve LE strength grades by 1 to increase ease of functional mobility with transfers and gait. 5. Pt will demonstrate improved balance by one grade in order to decrease risk of falls. 6. Climb 12 steps with 1 HR independently to simulate access to second floor of home. PT Treatment Day 2   Plan   Treatment/Interventions LE strengthening/ROM; Elevations; Therapeutic exercise; Endurance training;Equipment eval/education;Gait training;Spoke to nursing;Spoke to case management;Spoke to advanced practitioner   Progress Progressing toward goals   PT Frequency 2-3x/wk   Recommendation   PT Discharge Recommendation No rehabilitation needs   AM-PAC Basic Mobility Inpatient   Turning in Flat Bed Without Bedrails 4   Lying on Back to Sitting on Edge of Flat Bed Without Bedrails 3   Moving Bed to Chair 4   Standing Up From Chair Using Arms 4   Walk in Room 3   Climb 3-5 Stairs With Railing 3   Basic Mobility Inpatient Raw Score 21   Basic Mobility Standardized Score 45.55   Highest Level Of Mobility   JH-HLM Goal 6: Walk 10 steps or more   JH-HLM Achieved 7: Walk 25 feet or more   Education   Education Provided Mobility training;Assistive device; Other  (O2 tubing management)   Patient Explanation/teachback used;Demonstrates verbal understanding   End of Consult   Patient Position at End of Consult Bedside chair; All needs within reach;Bed/Chair alarm activated   End of Consult Comments Based on patient's [facility] Level of Mobility scores today, patient currently has a goal of -[facility] Levels: 7: WALK 25 FEET OR MORE, to be completed with RN staffing each shift, in order to improve overall activity tolerance and mobility, combat hospital related deconditioning, and maximize outcomes for d/c from the acute care setting. Gait Speed Interpretation:  Gain of 0.1 m/s is a predictor of well-being in those w/ abnormal walking speed compared to age matched peers  <0.7m/s is at an increased risk for falls  Household ambulator: <0.4 m/s  Limited community ambulator: 0.4-0.8 m/s  [employer] ambulator: 0.8-1.2 m/s  Able to safely cross streets: >1.2 m/s      The patient's AM-PAC Basic Mobility Inpatient Short Form Raw Score is 21. A Raw score of greater than 16 suggests the patient may benefit from discharge to home. Please also refer to the recommendation of the Physical Therapist for safe discharge planning.         Samantha Nur, PT, DPT   Available via [employer]  NP # [phone]  [place] License - [id number]  9/51/5702

## 2023-08-18 NOTE — CASE MANAGEMENT
Case Management Discharge Planning Note    Patient name Hugo Peoples  Location S /S -01 MRN [id number]  : 1960 Date 2023       Current Admission Date: 2023  Current Admission Diagnosis:Perforated diverticulum   Patient Active Problem List    Diagnosis Date Noted   • Perforated diverticulum 2023   • Abdominal wall hernia 2023   • Asymptomatic menopause 2023   • Bilateral groin pain 2023   • High serum ferritin 2023   • Urinary frequency 2023   • Weight loss 2023   • Other fatigue 2023   • Pelvic pain 2023   • Encounter for screening mammogram for malignant neoplasm of breast 2023   • Hemorrhoids 2023   • Painful urination 2023   • Constipation 2023   • Hypertension 2021      LOS (days): 6  Geometric Mean LOS (GMLOS) (days): 9.80  Days to GMLOS:4.1     OBJECTIVE:  Risk of Unplanned Readmission Score: 9.05         Current admission status: Inpatient   Preferred Pharmacy:   [id number]   [address] [place] [address]  Phone: [phone] [facility]ax[facility] [address] [place] [address]  Phone: [phone] Fax: [phone]    Primary Care Provider: FRANCISCA Szymanski    Primary Insurance: BLUE CROSS  Secondary Insurance: [facility]    DISCHARGE DETAILS:    Discharge planning discussed with[de-identified] Patient  Freedom of Choice: Yes  Comments - Freedom of Choice: CM discussed discharge plans and she's in agreement with [address] [place] [address] referral and made aware they were able to accept her case for SN for colostomy care/teaching and new O2 needs.   CM contacted family/caregiver?: Yes  Were Treatment Team discharge recommendations reviewed with patient/caregiver?: Yes  Did patient/caregiver verbalize understanding of patient care needs?: Yes  Were patient/caregiver advised of the risks associated with not following Treatment Team discharge recommendations?: Yes    Contacts  Patient Contacts: Patient  Relationship to Patient[de-identified] Family  Contact Method: In Person  Reason/Outcome: Discharge Planning, Referral    Requested [address]         Is the patient interested in [facility] at discharge?: Yes  [facility] requested[de-identified] [facility] Name[de-identified] Win Provider[de-identified] PCP  Home Health Services Needed[de-identified] Oxygen Via Nasal Cannula, Wound/Ostomy Care  Homebound Criteria Met[de-identified] Requires the Assistance of Another Person for Safe Ambulation or to Leave the Home  Supporting Clincal Findings[de-identified] Requires Oxygen, Limited Endurance, Fatigues Easliy in [facility] [employer]    DME Referral Provided  Referral made for DME?: Yes  DME referral completed for the following items[de-identified] Home Oxygen concentrator  DME Supplier Name[de-identified] [employer]    Other Referral/Resources/Interventions Provided:  Interventions: HHC, DME  Referral Comments: [facility]. Oxygen will be delivered to patient's home this evening. CM will work on transport due to new needs for oxygen at 6L with exertion.     Would you like to participate in our [facility] NextDocs service program?  : No - Declined    Treatment Team Recommendation: Home with [address]   Discharge Destination Plan[de-identified] Home with [facility] at Discharge : [facility]  Dispatcher Contacted: Yes  Number/Name of Dispatcher: [employer]     ETA of Transport (Date): 08/18/23  ETA of Transport (Time): 1900  Transport Service Arrived: Yes  Transfer Mode: Wheelchair  Accompanied by: EMS personnel     IMM Given (Date):: 08/18/23  IMM Given to[de-identified] Patient

## 2023-08-18 NOTE — PROGRESS NOTES
-- Patient:  -- MRN: [id number]  -- Aidin Request ID: [id number]  -- Level of care reserved:  -- Partner Reserved:  -- Clinical needs requested:  -- Geography searched: [address]  -- Start of Service:  -- Request sent: 11:29am EDT on 8/15/2023 by Lidia Dwyer  -- Partner reserved: by Lidia Dwyer  -- Choice list shared: 1:05pm EDT on 8/18/2023 by Lidia Dwyer

## 2023-08-18 NOTE — PLAN OF CARE
Problem: PHYSICAL THERAPY ADULT  Goal: Performs mobility at highest level of function for planned discharge setting. See evaluation for individualized goals. Description: Treatment/Interventions: Functional transfer training, LE strengthening/ROM, Therapeutic exercise, Cognitive reorientation, Endurance training, Patient/family training, Equipment eval/education, Bed mobility, Gait training, Spoke to nursing, Spoke to case management, OT  Equipment Recommended: Sylvia Cervantes       See flowsheet documentation for full assessment, interventions and recommendations. Outcome: Progressing  Note: Prognosis: Good  Problem List: Decreased endurance, Impaired balance, Decreased mobility, Decreased skin integrity, Decreased strength  Assessment: Pt seen for PT treatment 8/18/2023 with focus on: gait training and elevations training. Per EMR: pt to require home O2 upon d/c, therefore education on O2 management with mobility also completed. Pt presents seated OOB in recliner, reports 0/10 pain, and is agreeable to participate in session. Pt participates in gait training, with intervention focusing on goal of improved independence, stability, and endurance. Pt is able to independently complete transfers, ambulates 180ft with no AD and S (self management of O2 tank  care home), and negotiation of 4 steps up/down with u/l HR and S. Pt continues to require frequent but brief standing rest breaks due to SOB/BUTLER, however SPO2 monitored and stable t/o session. At end of session, pt was left seated OOB in recliner chair, with all needs in reach. Pt continues to most appropriately benefit from return to home with increased family and social supports upon d/c from the acute care setting. Continue skilled PT POC as able and appropriate in order to progress towards therapy goals and maximize independence with functional mobility.  Next session, plan for intervention of pt ambulation in room with O2 tubing extension/self management to simulate her return to home with home O2. Barriers to Discharge: Other (Comment) (increased education needed for O2 lines/tubing management with mobility, as pt now requiring supplemental O2 on d/c.)     PT Discharge Recommendation: No rehabilitation needs    See flowsheet documentation for full assessment.

## 2023-08-18 NOTE — DISCHARGE INSTR - AVS FIRST PAGE
Please call the office when you leave to schedule an appointment to be seen in 2 weeks    - Please make an appointment follow-up with OB/GYN for ovarian cyst  - Please follow-up with urology as scheduled on 8/25  - Please follow-up with your primary care physician in regards to your incidentally found adrenal nodule  - Home oxygen will be delivered to your residence; please use as follows  - 4 LPM at rest  - 6 LPM on exertion    Activity:    Do not lift more than 10 pounds (a gallon of milk) for 1-2 weeks post-operatively                 Walking is encouraged  Normal daily activities including climbing steps are okay  Do not engage in strenuous activity or contact sports for 4-6 weeks post-operatively    Return to Work:   Okay to return to work in one week    Diet:   You may return to your normal heart healthy diet. Wound Care: Your wound is closed with staples  It is okay to shower. Wash incision gently with soap and water and pat dry. Do not soak incisions in bath water or swim for two weeks. Do not apply any creams or ointments. You will have your staples removed in the clinic on your follow-up visit  Perform daily ostomy care per visiting nursing    Pain Medication:   Please take as directed  No driving while taking narcotic pain medications    Other:  If you have questions after discharge please call the office.   Resume your home medications as prescribed    If you have increased pain, fever >101.5, increased drainage, redness or a bad smell at your surgery site, please call us immediately or come directly to the Emergency Room

## 2023-08-18 NOTE — RESPIRATORY THERAPY NOTE
Home Oxygen Qualifying Test     Patient name: Blu Lynn        : 1960   Date of Test:  2023  Diagnosis:    Home Oxygen Test:    **Medicare Guidelines require item(s) 1-5 on all ambulatory patients or 1 and 2 on non-ambulatory patients. 1. Baseline SPO2 on Room Air at rest 80 %   a. If <= 88% on Room Air add O2 via NC to obtain SpO2 >=88%. If LPM needed, document LPM 4 needed to reach =>88%    2. SPO2 during exertion on Room Air 80  %  a. During exertion monitor SPO2. If SPO2 increases >=89%, do not add supplemental oxygen    3. SPO2 on Oxygen at Rest 89 % at 4 LPM    4. SPO2 during exertion on Oxygen 92 % at 6 LPM    5. Test performed during exertion activity. [x]  Supplemental Home Oxygen is indicated. []  Client does not qualify for home oxygen. Respiratory Additional Notes- Pt was found 3L NC spO2 87%. Quick RA challenge dropped to 80%, recovered to 89% on 4L NC. When pt was moving out of the bed, dropped to 86% on 4L NC, next available LPM was 6L on the tank regulator, improved to 92% on 6L NC while walking. HR increased from 90s to 110s during exercise.     Peterson Flores, RT

## 2023-08-21 ENCOUNTER — TRANSITIONAL CARE MANAGEMENT (OUTPATIENT)
Dept: FAMILY MEDICINE CLINIC | Facility: CLINIC | Age: 63
End: 2023-08-21

## 2023-08-21 ENCOUNTER — HOME CARE VISIT (OUTPATIENT)
Dept: HOME HEALTH SERVICES | Facility: HOME HEALTHCARE | Age: 63
End: 2023-08-21
Payer: COMMERCIAL

## 2023-08-21 VITALS
OXYGEN SATURATION: 99 % | RESPIRATION RATE: 20 BRPM | TEMPERATURE: 97.4 F | SYSTOLIC BLOOD PRESSURE: 120 MMHG | HEART RATE: 96 BPM | DIASTOLIC BLOOD PRESSURE: 70 MMHG

## 2023-08-21 PROCEDURE — 10330081 VN NO-PAY CLAIM PROCEDURE

## 2023-08-21 PROCEDURE — G0299 HHS/HOSPICE OF RN EA 15 MIN: HCPCS

## 2023-08-21 PROCEDURE — 400013 VN SOC

## 2023-08-21 NOTE — TELEPHONE ENCOUNTER
Called and spoke to patient. Reviewed she can shower and instructed on how to care for catheter. Attempted to explain why cystogtram is needed and then the call dropped. Called patient back and went to . Left  with office number. When patient calls back please inform patient the imaging is done prior to catheter removal to ensure there is no leaks after procedure. If there was a leak it would not be safe to remove catheter.

## 2023-08-21 NOTE — TELEPHONE ENCOUNTER
I called and spoke to patient. She will be calling to get the testing scheduled prior to 8/25 visit. We will monitor for appt testing to get scheduled. She has questions regarding why this test is needed prior and if she can shower or not. Please advise patient.

## 2023-08-23 ENCOUNTER — HOSPITAL ENCOUNTER (OUTPATIENT)
Dept: RADIOLOGY | Facility: HOSPITAL | Age: 63
Discharge: HOME | End: 2023-08-23
Payer: COMMERCIAL

## 2023-08-23 DIAGNOSIS — K57.20 DIVERTICULITIS OF LARGE INTESTINE WITH PERFORATION AND ABSCESS: ICD-10-CM

## 2023-08-23 PROCEDURE — 51600 INJECTION FOR BLADDER X-RAY: CPT

## 2023-08-23 PROCEDURE — 72194 CT PELVIS W/O & W/DYE: CPT

## 2023-08-23 PROCEDURE — G1004 CDSM NDSC: HCPCS

## 2023-08-24 NOTE — TELEPHONE ENCOUNTER
Called and spoke to patient. Reviewed per AP ok to remove catheter tomorrow. Patient thankful for this information and will f/u as scheduled for tomorrow morning.

## 2023-08-25 ENCOUNTER — HOME CARE VISIT (OUTPATIENT)
Dept: HOME HEALTH SERVICES | Facility: HOME HEALTHCARE | Age: 63
End: 2023-08-25
Payer: COMMERCIAL

## 2023-08-25 ENCOUNTER — PROCEDURE VISIT (OUTPATIENT)
Dept: UROLOGY | Facility: CLINIC | Age: 63
End: 2023-08-25

## 2023-08-25 VITALS
OXYGEN SATURATION: 100 % | TEMPERATURE: 98 F | SYSTOLIC BLOOD PRESSURE: 120 MMHG | HEART RATE: 100 BPM | DIASTOLIC BLOOD PRESSURE: 76 MMHG | RESPIRATION RATE: 20 BRPM

## 2023-08-25 VITALS
BODY MASS INDEX: 32.58 KG/M2 | HEART RATE: 82 BPM | SYSTOLIC BLOOD PRESSURE: 118 MMHG | WEIGHT: 151 LBS | OXYGEN SATURATION: 100 % | HEIGHT: 57 IN | DIASTOLIC BLOOD PRESSURE: 70 MMHG

## 2023-08-25 DIAGNOSIS — K57.80 PERFORATED DIVERTICULUM: Primary | ICD-10-CM

## 2023-08-25 PROCEDURE — 99024 POSTOP FOLLOW-UP VISIT: CPT

## 2023-08-25 PROCEDURE — G0299 HHS/HOSPICE OF RN EA 15 MIN: HCPCS

## 2023-08-25 NOTE — PROGRESS NOTES
8/25/2023    Yanira Bigger  1960  [id number]    Diagnosis  Chief Complaint    murray removal         Patient presents for murray removal managed by our office. Plan  -patient to follow up as scheduled  -knows to reach out with any concerns    Procedure Murray removal/voiding trial    Murray catheter removed after deflation of an intact balloon. Patient tolerated well. Encouraged patient to hydrate well and return this afternoon for post void residual if unable to urinate. she knows she may return early if uncomfortable and unable to urinate. Patient agrees to this plan. Called and spoke to patient. Patient is urinating well. Patient does have some dysuria. Informed patient that may be normal. Patient is taking pyridium.        Vitals:    08/25/23 0852   BP: 118/70   Pulse: 82   SpO2: 100%   Weight: 68.5 kg (151 lb)   Height: 4' 9" (1.448 m)           Darcie Soulier, LPN

## 2023-08-28 ENCOUNTER — HOME CARE VISIT (OUTPATIENT)
Dept: HOME HEALTH SERVICES | Facility: HOME HEALTHCARE | Age: 63
End: 2023-08-28
Payer: COMMERCIAL

## 2023-08-28 VITALS
RESPIRATION RATE: 20 BRPM | SYSTOLIC BLOOD PRESSURE: 118 MMHG | TEMPERATURE: 96.6 F | DIASTOLIC BLOOD PRESSURE: 70 MMHG | OXYGEN SATURATION: 95 % | HEART RATE: 93 BPM

## 2023-08-28 PROCEDURE — G0180 MD CERTIFICATION HHA PATIENT: HCPCS | Performed by: SURGERY

## 2023-08-28 PROCEDURE — G0299 HHS/HOSPICE OF RN EA 15 MIN: HCPCS

## 2023-08-29 ENCOUNTER — OFFICE VISIT (OUTPATIENT)
Dept: FAMILY MEDICINE CLINIC | Facility: CLINIC | Age: 63
End: 2023-08-29
Payer: COMMERCIAL

## 2023-08-29 ENCOUNTER — HOME CARE VISIT (OUTPATIENT)
Dept: HOME HEALTH SERVICES | Facility: HOME HEALTHCARE | Age: 63
End: 2023-08-29
Payer: COMMERCIAL

## 2023-08-29 VITALS
TEMPERATURE: 97 F | SYSTOLIC BLOOD PRESSURE: 118 MMHG | HEIGHT: 57 IN | WEIGHT: 137 LBS | BODY MASS INDEX: 29.56 KG/M2 | HEART RATE: 90 BPM | RESPIRATION RATE: 16 BRPM | DIASTOLIC BLOOD PRESSURE: 78 MMHG | OXYGEN SATURATION: 97 %

## 2023-08-29 DIAGNOSIS — K57.80 PERFORATED DIVERTICULUM: Primary | ICD-10-CM

## 2023-08-29 DIAGNOSIS — J96.01 ACUTE RESPIRATORY FAILURE WITH HYPOXIA (HCC): ICD-10-CM

## 2023-08-29 DIAGNOSIS — K37 APPENDICITIS, UNSPECIFIED APPENDICITIS TYPE: ICD-10-CM

## 2023-08-29 DIAGNOSIS — D64.9 ANEMIA, UNSPECIFIED TYPE: ICD-10-CM

## 2023-08-29 DIAGNOSIS — I10 HYPERTENSION, UNSPECIFIED TYPE: ICD-10-CM

## 2023-08-29 DIAGNOSIS — K80.11 CHRONIC CHOLECYSTITIS DUE TO GALLBLADDER CALCULUS WITH OBSTRUCTION: ICD-10-CM

## 2023-08-29 DIAGNOSIS — E27.8 ADRENAL NODULE (HCC): ICD-10-CM

## 2023-08-29 PROCEDURE — 99496 TRANSJ CARE MGMT HIGH F2F 7D: CPT | Performed by: NURSE PRACTITIONER

## 2023-08-29 NOTE — PROGRESS NOTES
Assessment & Plan     1. Perforated diverticulum  Assessment & Plan:  - s/p exploratory laparotomy, sigmoid resection with creation of end colostomy for perforated diverticulitis as well as a colovesical fistula, appendectomy, and cholecystectomy. - Doing well post-op. Has visiting nurses. - Has follow up with General Surgery tomorrow. 2. Appendicitis, unspecified appendicitis type  Assessment & Plan:  - s/p appendectomy.   - Has follow up with General Surgery tomorrow. 3. Chronic cholecystitis due to gallbladder calculus with obstruction  Assessment & Plan:  - s/p cholecystectomy.  - Has follow up with General Surgery tomorrow. 4. Hypertension, unspecified type  Assessment & Plan:  - Held losartan during hospital admission due to hypotension. She has not resumed her medication and BP is currently well controlled. - Advised to monitor at home and contact office with consistently elevated readings. Orders:  -     CBC and differential; Future  -     Comprehensive metabolic panel; Future  -     Lipid Panel with Direct LDL reflex; Future  -     TSH, 3rd generation with Free T4 reflex; Future    5. Anemia, unspecified type  Assessment & Plan:  - Will check CBC and iron panel. Orders:  -     Iron Panel (Includes Ferritin, Iron Sat%, Iron, and TIBC); Future    6. Adrenal nodule (HCC)  Assessment & Plan:  - Incidentally noted 1.3 x 1.1 cm left adrenal nodule on CT abdomen/pelvis. Recommend follow up in 6 months.   - Consider referral to Endocrinology. 7. Acute respiratory failure with hypoxia (HCC)  Assessment & Plan:  - Likely related to volume overload also in setting of tobacco use. Required nasal cannula for short time. Respiratory status improved s/p IV lasix. - O2 sat currently 97% on RA.   - Encourage smoking cessation. Subjective     Transitional Care Management Review:   Eden Nuñez is a 70 y.o. female here for TCM follow up.      During the TCM phone call patient stated:  TCM Call     Date and time call was made  8/21/2023  9:02 AM    Hospital care reviewed  Records reviewed    Patient was hospitialized at  [address] [place] [address]    Date of Admission  08/12/23    Date of discharge  08/18/23    Diagnosis  Perforated diverticulum    Disposition  Home    Were the patients medications reviewed and updated  No      TCM Call     Should patient be enrolled in anticoag monitoring? No    Scheduled for follow up? Yes    Did you obtain your prescribed medications  Yes    Do you need help managing your prescriptions or medications  No    Is transportation to your appointment needed  Yes    Specify why  pt had surgery- pt cannot drive at this moment    I have advised the patient to call PCP with any new or worsening symptoms  Steven Dduley RN    Are you recieving any outpatient services  No    Are you recieving home care services  Yes    Types of home care services  Other (comment)    Comment  Oxygen Via Nasal Cannula, Wound/Ostomy Care,    Are you using any community resources  No    Current waiver services  No    Have you fallen in the last 12 months  No    Interperter language line needed  No        Patient presents to office today for hospital follow up. She presented to the ER on 8/12 with abdominal pain. CT of abdomen and pelvis showed acute sigmoid diverticulitis with perforation. Patient emergently went to the operating room at the time of admission for exploratory laparotomy, sigmoid resection with creation of end colostomy for perforated diverticulitis as well as a colovesical fistula. Appendix was also involved and inflamed therefore an appendectomy was performed. Due to a large palpable stone, cholecystectomy was also performed. Also repair of an epigastric hernia was performed. Hospital admission was complicated by acute hypoxic respiratory failure requiring nasal cannula in setting of tobacco use and concern for fluid overload/pulmonary edema.  She received IV lasix with improvement. Condition improved and she was discharged home in stable condition. Ostomy is functioning well. She is eating and drinking ok. They stopped her losartan inpatient due to hypotension. Her BP is well controlled at today's visit. She has visiting nurses helping her with ostomy care. She feels much better since her hospitalization. She has a follow up with General Surgery tomorrow. Review of Systems   Constitutional: Negative for fatigue and fever. HENT: Negative for trouble swallowing. Eyes: Negative for visual disturbance. Respiratory: Negative for cough and shortness of breath. Cardiovascular: Negative for chest pain and palpitations. Gastrointestinal: Negative for abdominal pain and blood in stool. Endocrine: Negative for cold intolerance and heat intolerance. Genitourinary: Negative for difficulty urinating and dysuria. Musculoskeletal: Negative for gait problem. Skin: Negative for rash. Neurological: Negative for dizziness, syncope and headaches. Hematological: Negative for adenopathy. Psychiatric/Behavioral: Negative for behavioral problems. Objective     /78 (BP Location: Left arm, Patient Position: Sitting, Cuff Size: Large)   Pulse 90   Temp (!) 97 °F (36.1 °C) (Tympanic)   Resp 16   Ht 4' 9" (1.448 m)   Wt 62.1 kg (137 lb)   SpO2 97%   BMI 29.65 kg/m²      Physical Exam  Vitals and nursing note reviewed. Constitutional:       Appearance: Normal appearance. She is well-developed. HENT:      Head: Normocephalic and atraumatic. Right Ear: External ear normal.      Left Ear: External ear normal.   Eyes:      Conjunctiva/sclera: Conjunctivae normal.   Cardiovascular:      Rate and Rhythm: Normal rate and regular rhythm. Heart sounds: Normal heart sounds. Pulmonary:      Effort: Pulmonary effort is normal.      Breath sounds: Normal breath sounds. Abdominal:      General: Bowel sounds are normal.      Palpations: Abdomen is soft. Musculoskeletal:         General: Normal range of motion. Cervical back: Normal range of motion. Skin:     General: Skin is warm and dry. Neurological:      Mental Status: She is alert and oriented to person, place, and time.    Psychiatric:         Mood and Affect: Mood normal.         Behavior: Behavior normal.       Medications have been reviewed by provider in current encounter    FRANCISCA Huang

## 2023-08-30 ENCOUNTER — OFFICE VISIT (OUTPATIENT)
Dept: SURGERY | Facility: CLINIC | Age: 63
End: 2023-08-30

## 2023-08-30 DIAGNOSIS — N32.1 COLOVESICAL FISTULA: ICD-10-CM

## 2023-08-30 DIAGNOSIS — M79.89 LEFT LEG SWELLING: Primary | ICD-10-CM

## 2023-08-30 DIAGNOSIS — K43.9 EPIGASTRIC HERNIA: ICD-10-CM

## 2023-08-30 DIAGNOSIS — K37 APPENDICITIS: ICD-10-CM

## 2023-08-30 DIAGNOSIS — K57.20 DIVERTICULITIS OF COLON WITH PERFORATION: ICD-10-CM

## 2023-08-30 DIAGNOSIS — K80.11 CHRONIC CHOLECYSTITIS DUE TO GALLBLADDER CALCULUS WITH OBSTRUCTION: ICD-10-CM

## 2023-08-30 DIAGNOSIS — N83.202 LEFT OVARIAN CYST: ICD-10-CM

## 2023-08-30 DIAGNOSIS — Z87.891 HISTORY OF TOBACCO USE: ICD-10-CM

## 2023-08-30 PROCEDURE — 99024 POSTOP FOLLOW-UP VISIT: CPT | Performed by: SURGERY

## 2023-08-30 NOTE — LETTER
August 30, 2023     Mario Jose FRANCISCA  [id number] [facility] [id number]    Patient: Gorge Denis   YOB: 1960   Date of Visit: 8/30/2023       Dear Dr. Marianne Trevizo:    Thank you for referring Gorge Denis to me for evaluation. Below are my notes for this consultation. If you have questions, please do not hesitate to call me. I look forward to following your patient along with you. Sincerely,        Nandini Panchal DO        CC: No Recipients    Nandini mercado DO  8/30/2023 11:53 AM  Sign when Signing Visit  Assessment/Plan:    Diagnoses and all orders for this visit:    Left leg swelling  -     VAS Lower extremity venous insufficiency duplex, bilateral w/ measurements; Future        Diverticulitis of colon with perforation    Colovesical fistula    Appendicitis    Chronic cholecystitis due to gallbladder calculus with obstruction    History of tobacco use    Overall doing quite well from extensive emergent procedure. Colostomy functioning with a good seal.  Del Toro catheter is now out. Clips removed today. Stressed the need to continue daily walking and exercise. Further stressed the need to attempt tobacco cessation. She may eat anything she desires and should focus on high proteins. Would wait at least 3 if not almost 6 months before entertaining reversal.  Needs GYN follow-up regarding the cyst.    Initially saw pulmonary during her hospitalization but will leave that decision between her family physician and her whether that is necessary in the future. We will see back in 3 weeks for an update      Left ovarian cyst.  Will be making appointment with GYN        Pathology: Reviewed with patient, all questions answered. Postoperative restrictions reviewed. All questions answered. ______________________________________________________  HPI: Patient presents post operatively.   Hartmans procedure, laparotomy exploratory, takedown of colovesicle fistula, repair of epigastric hernia, appendectomy, cholecystectomy 8/13/2023. Final Diagnosis  A. Sigmoid colon, resection:   -   Sigmoid colon with transmural defect, diverticulitis, abscess, hyperplastic polyp, inflammatory polyp, and acute serositis. -   Proximal resection margin is involved by diverticulosis. -   Distal resection margin is uninvolved by diverticulosis. -   Three benign lymph nodes. B. Appendix, appendectomy:  -   Appendix with acute appendicitis, mural necrosis, abscess and acute serositis. -   Fibrous obliteration of the tip. C. Gallbladder, cholecystectomy:  -   Gallbladder with cholelithiasis and chronic cholecystitis. Patient emergently went to the operating room at the time of admission for exploratory laparotomy, sigmoid resection with creation of end colostomy for perforated diverticulitis as well as a colovesical fistula. Appendix was also involved inflammation therefore an appendectomy was performed. Do a large palpable stone, cholecystectomy was also performed. Also repair of an epigastric hernia encountered during surgery. She since had a CT cystogram and evaluated by urology. Del Toro catheter is out. States colostomy appliance is fitting well and she is managing. Notes no further air when she voids. Does note intermittent swelling of her left leg and ankle. States her entire hospitalization is a "blur". Does not really remember much of anything.   I reviewed her entire hospital stay during today's visit             ROS:  General ROS: negative for - chills, fatigue, fever or night sweats, weight loss  Respiratory ROS: no cough, shortness of breath, or wheezing  Cardiovascular ROS: no chest pain or dyspnea on exertion  Genito-Urinary ROS: no dysuria, trouble voiding, or hematuria  Musculoskeletal ROS: negative for - gait disturbance, joint pain or muscle pain  Neurological ROS: no TIA or stroke symptoms  GI ROS: see HPI  Skin ROS: no new rashes or lesions   Lymphatic ROS: no new adenopathy noted by pt. GYN ROS: see HPI, no new GYN history or bleeding noted  Psy ROS: no new mental or behavioral disturbances         Patient Active Problem List   Diagnosis   • Hypertension   • Encounter for screening mammogram for malignant neoplasm of breast   • Hemorrhoids   • Painful urination   • Constipation   • Urinary frequency   • Weight loss   • Other fatigue   • Pelvic pain   • Asymptomatic menopause   • Bilateral groin pain   • High serum ferritin   • Abdominal wall hernia   • Perforated diverticulum   • Left leg swelling   • Colovesical fistula   • Diverticulitis of colon with perforation   • Appendicitis   • Chronic cholecystitis due to gallbladder calculus with obstruction   • History of tobacco use   • Left ovarian cyst       Allergies:  Acetaminophen and Penicillins      Current Outpatient Medications:   •  oxygen gas, Inhale 4 L/min continuous. 4l at rest and 6l on exertion  Indications: sob, Disp: , Rfl:     Past Medical History:   Diagnosis Date   • Breathing difficulty    • Hypertension        Past Surgical History:   Procedure Laterality Date   • APPENDECTOMY N/A 08/12/2023    Procedure: APPENDECTOMY;  Surgeon: Nandini Panchal DO;  [facility];  Service: General   • CHOLECYSTECTOMY N/A 08/12/2023    Procedure: CHOLECYSTECTOMY;  Surgeon: Nandini Panchal DO;  [facility];  Service: General   • COLON SURGERY     • CT CYSTOGRAM  08/23/2023   • HARTMANS PROCEDURE N/A 08/12/2023    Procedure: Isela Leventhal;  Surgeon: Nandini Panchal DO;  [facility];  Service: General   • LAPAROTOMY N/A 08/12/2023    Procedure: LAPAROTOMY EXPLORATORY, TAKEDOWN OF COLOVESICLE FISTULA, REPAIR OF EPIGASTRIC HERNIA;  Surgeon: Nandini Panchal DO;  [facility];  Service: General       No family history on file. reports that she has been smoking cigarettes. She has a 10.00 pack-year smoking history.  She has never used smokeless tobacco. She reports that she does not drink alcohol and does not use drugs. PHYSICAL EXAM    There were no vitals taken for this visit. General: normal, cooperative, no distress  Abdominal: soft, nondistended or nontender  Incision: clean, dry, and intact and healing well. Well-healed incision. Skin clips were removed.   Left sided colostomy pink and intact      Scarlet Sweet, DO    Date: 8/30/2023 Time: 11:53 AM

## 2023-08-30 NOTE — ASSESSMENT & PLAN NOTE
- s/p exploratory laparotomy, sigmoid resection with creation of end colostomy for perforated diverticulitis as well as a colovesical fistula, appendectomy, and cholecystectomy. - Doing well post-op. Has visiting nurses. - Has follow up with General Surgery tomorrow.

## 2023-08-30 NOTE — PROGRESS NOTES
Assessment/Plan:    Diagnoses and all orders for this visit:    Left leg swelling  -     VAS Lower extremity venous insufficiency duplex, bilateral w/ measurements; Future        Diverticulitis of colon with perforation    Colovesical fistula    Appendicitis    Chronic cholecystitis due to gallbladder calculus with obstruction    History of tobacco use    Overall doing quite well from extensive emergent procedure. Colostomy functioning with a good seal.  Del Toro catheter is now out. Clips removed today. Stressed the need to continue daily walking and exercise. Further stressed the need to attempt tobacco cessation. She may eat anything she desires and should focus on high proteins. Would wait at least 3 if not almost 6 months before entertaining reversal.  Needs GYN follow-up regarding the cyst.    Initially saw pulmonary during her hospitalization but will leave that decision between her family physician and her whether that is necessary in the future. We will see back in 3 weeks for an update      Left ovarian cyst.  Will be making appointment with GYN        Pathology: Reviewed with patient, all questions answered. Postoperative restrictions reviewed. All questions answered. ______________________________________________________  HPI: Patient presents post operatively. Hartmans procedure, laparotomy exploratory, takedown of colovesicle fistula, repair of epigastric hernia, appendectomy, cholecystectomy 8/13/2023. Final Diagnosis  A. Sigmoid colon, resection:   -   Sigmoid colon with transmural defect, diverticulitis, abscess, hyperplastic polyp, inflammatory polyp, and acute serositis. -   Proximal resection margin is involved by diverticulosis. -   Distal resection margin is uninvolved by diverticulosis. -   Three benign lymph nodes.   B. Appendix, appendectomy:  -   Appendix with acute appendicitis, mural necrosis, abscess and acute serositis.   -   Fibrous obliteration of the tip.   C. Gallbladder, cholecystectomy:  -   Gallbladder with cholelithiasis and chronic cholecystitis. Patient emergently went to the operating room at the time of admission for exploratory laparotomy, sigmoid resection with creation of end colostomy for perforated diverticulitis as well as a colovesical fistula. Appendix was also involved inflammation therefore an appendectomy was performed. Do a large palpable stone, cholecystectomy was also performed. Also repair of an epigastric hernia encountered during surgery. She since had a CT cystogram and evaluated by urology. Del Toro catheter is out. States colostomy appliance is fitting well and she is managing. Notes no further air when she voids. Does note intermittent swelling of her left leg and ankle. States her entire hospitalization is a "blur". Does not really remember much of anything. I reviewed her entire hospital stay during today's visit             ROS:  General ROS: negative for - chills, fatigue, fever or night sweats, weight loss  Respiratory ROS: no cough, shortness of breath, or wheezing  Cardiovascular ROS: no chest pain or dyspnea on exertion  Genito-Urinary ROS: no dysuria, trouble voiding, or hematuria  Musculoskeletal ROS: negative for - gait disturbance, joint pain or muscle pain  Neurological ROS: no TIA or stroke symptoms  GI ROS: see HPI  Skin ROS: no new rashes or lesions   Lymphatic ROS: no new adenopathy noted by pt.    GYN ROS: see HPI, no new GYN history or bleeding noted  Psy ROS: no new mental or behavioral disturbances         Patient Active Problem List   Diagnosis   • Hypertension   • Encounter for screening mammogram for malignant neoplasm of breast   • Hemorrhoids   • Painful urination   • Constipation   • Urinary frequency   • Weight loss   • Other fatigue   • Pelvic pain   • Asymptomatic menopause   • Bilateral groin pain   • High serum ferritin   • Abdominal wall hernia   • Perforated diverticulum   • Left leg swelling   • Colovesical fistula   • Diverticulitis of colon with perforation   • Appendicitis   • Chronic cholecystitis due to gallbladder calculus with obstruction   • History of tobacco use   • Left ovarian cyst       Allergies:  Acetaminophen and Penicillins      Current Outpatient Medications:   •  oxygen gas, Inhale 4 L/min continuous. 4l at rest and 6l on exertion  Indications: sob, Disp: , Rfl:     Past Medical History:   Diagnosis Date   • Breathing difficulty    • Hypertension        Past Surgical History:   Procedure Laterality Date   • APPENDECTOMY N/A 08/12/2023    Procedure: APPENDECTOMY;  Surgeon: Bal Pancahl DO;  [facility];  Service: General   • CHOLECYSTECTOMY N/A 08/12/2023    Procedure: CHOLECYSTECTOMY;  Surgeon: aBl Panchal DO;  [facility];  Service: General   • COLON SURGERY     • CT CYSTOGRAM  08/23/2023   • HARTMANS PROCEDURE N/A 08/12/2023    Procedure: Chintanjabari Duck;  Surgeon: Bal Panchal DO;  [facility];  Service: General   • LAPAROTOMY N/A 08/12/2023    Procedure: LAPAROTOMY EXPLORATORY, TAKEDOWN OF COLOVESICLE FISTULA, REPAIR OF EPIGASTRIC HERNIA;  Surgeon: Bal Panchal DO;  [facility];  Service: General       No family history on file. reports that she has been smoking cigarettes. She has a 10.00 pack-year smoking history. She has never used smokeless tobacco. She reports that she does not drink alcohol and does not use drugs. PHYSICAL EXAM    There were no vitals taken for this visit. General: normal, cooperative, no distress  Abdominal: soft, nondistended or nontender  Incision: clean, dry, and intact and healing well. Well-healed incision. Skin clips were removed.   Left sided colostomy pink and intact      Toni Quinones DO    Date: 8/30/2023 Time: 11:53 AM

## 2023-08-31 ENCOUNTER — HOME CARE VISIT (OUTPATIENT)
Dept: HOME HEALTH SERVICES | Facility: HOME HEALTHCARE | Age: 63
End: 2023-08-31
Payer: COMMERCIAL

## 2023-08-31 VITALS
OXYGEN SATURATION: 94 % | HEART RATE: 80 BPM | TEMPERATURE: 98.4 F | SYSTOLIC BLOOD PRESSURE: 130 MMHG | RESPIRATION RATE: 16 BRPM | DIASTOLIC BLOOD PRESSURE: 70 MMHG

## 2023-08-31 PROBLEM — E27.9 ADRENAL NODULE (HCC): Status: ACTIVE | Noted: 2023-08-31

## 2023-08-31 PROBLEM — J96.01 ACUTE RESPIRATORY FAILURE WITH HYPOXIA (HCC): Status: ACTIVE | Noted: 2023-08-31

## 2023-08-31 PROBLEM — E27.8 ADRENAL NODULE (HCC): Status: ACTIVE | Noted: 2023-08-31

## 2023-08-31 PROBLEM — D64.9 ANEMIA: Status: ACTIVE | Noted: 2023-08-31

## 2023-08-31 PROCEDURE — G0300 HHS/HOSPICE OF LPN EA 15 MIN: HCPCS

## 2023-08-31 NOTE — ASSESSMENT & PLAN NOTE
- Held losartan during hospital admission due to hypotension. She has not resumed her medication and BP is currently well controlled. - Advised to monitor at home and contact office with consistently elevated readings.

## 2023-08-31 NOTE — ASSESSMENT & PLAN NOTE
- Incidentally noted 1.3 x 1.1 cm left adrenal nodule on CT abdomen/pelvis. Recommend follow up in 6 months.   - Consider referral to Endocrinology.

## 2023-08-31 NOTE — ASSESSMENT & PLAN NOTE
- Likely related to volume overload also in setting of tobacco use. Required nasal cannula for short time. Respiratory status improved s/p IV lasix. - O2 sat currently 97% on RA.   - Encourage smoking cessation.

## 2023-09-05 DIAGNOSIS — E27.8 ADRENAL NODULE (HCC): Primary | ICD-10-CM

## 2023-09-06 ENCOUNTER — HOME CARE VISIT (OUTPATIENT)
Dept: HOME HEALTH SERVICES | Facility: HOME HEALTHCARE | Age: 63
End: 2023-09-06
Payer: COMMERCIAL

## 2023-09-06 VITALS
DIASTOLIC BLOOD PRESSURE: 78 MMHG | SYSTOLIC BLOOD PRESSURE: 146 MMHG | HEART RATE: 94 BPM | OXYGEN SATURATION: 98 % | TEMPERATURE: 98.7 F | RESPIRATION RATE: 20 BRPM

## 2023-09-06 PROCEDURE — G0299 HHS/HOSPICE OF RN EA 15 MIN: HCPCS

## 2023-09-13 ENCOUNTER — HOME CARE VISIT (OUTPATIENT)
Dept: HOME HEALTH SERVICES | Facility: HOME HEALTHCARE | Age: 63
End: 2023-09-13
Payer: COMMERCIAL

## 2023-09-13 VITALS
OXYGEN SATURATION: 98 % | SYSTOLIC BLOOD PRESSURE: 140 MMHG | TEMPERATURE: 98.1 F | HEART RATE: 88 BPM | RESPIRATION RATE: 18 BRPM | DIASTOLIC BLOOD PRESSURE: 82 MMHG

## 2023-09-13 PROCEDURE — G0299 HHS/HOSPICE OF RN EA 15 MIN: HCPCS

## 2023-09-14 ENCOUNTER — TELEPHONE (OUTPATIENT)
Dept: FAMILY MEDICINE CLINIC | Facility: CLINIC | Age: 63
End: 2023-09-14

## 2023-09-14 NOTE — TELEPHONE ENCOUNTER
----- Message from Elvis Garcia, [address] [facility] sent at 9/5/2023  8:59 AM EDT -----  I got a notification from one of the physicians who saw her while she was in the hospital regarding her CT of abdomen/pelvis - they noted a left adrenal nodule and they would like to get follow up imaging on in 6 months. I placed order to CT of abdomen and pelvis with contrast. Can we call patient to let her know. Thanks!

## 2023-09-20 ENCOUNTER — OFFICE VISIT (OUTPATIENT)
Dept: SURGERY | Facility: CLINIC | Age: 63
End: 2023-09-20

## 2023-09-20 DIAGNOSIS — K57.20 DIVERTICULITIS OF COLON WITH PERFORATION: Primary | ICD-10-CM

## 2023-09-20 DIAGNOSIS — N32.1 COLOVESICAL FISTULA: ICD-10-CM

## 2023-09-20 PROCEDURE — 99024 POSTOP FOLLOW-UP VISIT: CPT | Performed by: SURGERY

## 2023-09-20 NOTE — LETTER
September 20, 2023     FRANCISCA Byrnes  [address] [facility] [address]    Patient: Jimenez Mi   YOB: 1960   Date of Visit: 9/20/2023       Dear Dr. Diamond Patton:    Thank you for referring Jimenez Mi to me for evaluation. Below are my notes for this consultation. If you have questions, please do not hesitate to call me. I look forward to following your patient along with you. Sincerely,        Elisabeth Panchal DO        CC: No Recipients    Elisabeth mercado DO  9/20/2023 12:18 PM  Sign when Signing Visit  Assessment/Plan:    Diagnoses and all orders for this visit:    Diverticulitis of colon with perforation    Colovesical fistula    She is now 1 month out from extensive procedure including sigmoid resection with creation of colostomy, takedown colovesical fistula, appendectomy, and cholecystectomy. Doing quite well and almost back to her typical activity level. May resume diet as tolerated. May return to work as of 10/2. Stressed the need for GYN follow-up given the ovarian cyst noted at the time of surgery as well as on imaging    Left leg is no longer swollen. At this point she may hold off on the Doppler study. Has repeat CAT scan scheduled for 6 months given finding of adrenal nodule. We will see back after the holidays in January to start the process of potentially reversing her colostomy. Postoperative restrictions reviewed. All questions answered. ______________________________________________________  HPI: Patient presents for three week follow up. S/P Tri's procedure, laparotomy exploratory, takedown of colovesicle fistula, repair of epigastric hernia, appendectomy, cholecystectomy 8/13/2023.              ROS:  General ROS: negative for - chills, fatigue, fever or night sweats, weight loss  Respiratory ROS: no cough, shortness of breath, or wheezing  Cardiovascular ROS: no chest pain or dyspnea on exertion  Genito-Urinary ROS: no dysuria, trouble voiding, or hematuria  Musculoskeletal ROS: negative for - gait disturbance, joint pain or muscle pain  Neurological ROS: no TIA or stroke symptoms  GI ROS: see HPI  Skin ROS: no new rashes or lesions   Lymphatic ROS: no new adenopathy noted by pt. GYN ROS: see HPI, no new GYN history or bleeding noted  Psy ROS: no new mental or behavioral disturbances         Patient Active Problem List   Diagnosis   • Hypertension   • Encounter for screening mammogram for malignant neoplasm of breast   • Hemorrhoids   • Painful urination   • Constipation   • Urinary frequency   • Weight loss   • Other fatigue   • Pelvic pain   • Asymptomatic menopause   • Bilateral groin pain   • High serum ferritin   • Abdominal wall hernia   • Perforated diverticulum   • Left leg swelling   • Colovesical fistula   • Diverticulitis of colon with perforation   • Appendicitis   • Chronic cholecystitis due to gallbladder calculus with obstruction   • History of tobacco use   • Left ovarian cyst   • Adrenal nodule (HCC)   • Acute respiratory failure with hypoxia (HCC)   • Anemia       Allergies:  Acetaminophen and Penicillins      Current Outpatient Medications:   •  oxygen gas, Inhale 4 L/min continuous.  4l at rest and 6l on exertion  Indications: sob, Disp: , Rfl:     Past Medical History:   Diagnosis Date   • Breathing difficulty    • Hypertension        Past Surgical History:   Procedure Laterality Date   • APPENDECTOMY N/A 08/12/2023    Procedure: APPENDECTOMY;  Surgeon: Sonia Panchal DO;  [facility];  Service: General   • CHOLECYSTECTOMY N/A 08/12/2023    Procedure: CHOLECYSTECTOMY;  Surgeon: Sonia Panchal DO;  [facility];  Service: General   • COLON SURGERY     • CT CYSTOGRAM  08/23/2023   • HARTMANS PROCEDURE N/A 08/12/2023    Procedure: Genia Given;  Surgeon: Sonia Panchal DO;  [facility];  Service: General   • LAPAROTOMY N/A 08/12/2023    Procedure: LAPAROTOMY EXPLORATORY, TAKEDOWN OF COLOVESICLE FISTULA, REPAIR OF EPIGASTRIC HERNIA;  Surgeon: Sophia Panchal DO;  [facility];  Service: General       No family history on file. reports that she has been smoking cigarettes. She has a 10.00 pack-year smoking history. She has never used smokeless tobacco. She reports that she does not drink alcohol and does not use drugs. PHYSICAL EXAM    There were no vitals taken for this visit. General: normal, cooperative, no distress  Abdominal: soft, nondistended or nontender  Incision: clean, dry, and intact and healing well.   Pink thin appearing left-sided colostomy      Gris Becerra DO    Date: 9/20/2023 Time: 12:16 PM

## 2023-09-20 NOTE — PROGRESS NOTES
Assessment/Plan:    Diagnoses and all orders for this visit:    Diverticulitis of colon with perforation    Colovesical fistula    She is now 1 month out from extensive procedure including sigmoid resection with creation of colostomy, takedown colovesical fistula, appendectomy, and cholecystectomy. Doing quite well and almost back to her typical activity level. May resume diet as tolerated. May return to work as of 10/2. Stressed the need for GYN follow-up given the ovarian cyst noted at the time of surgery as well as on imaging    Left leg is no longer swollen. At this point she may hold off on the Doppler study. Has repeat CAT scan scheduled for 6 months given finding of adrenal nodule. We will see back after the holidays in January to start the process of potentially reversing her colostomy. Postoperative restrictions reviewed. All questions answered. ______________________________________________________  HPI: Patient presents for three week follow up. S/P Tri's procedure, laparotomy exploratory, takedown of colovesicle fistula, repair of epigastric hernia, appendectomy, cholecystectomy 8/13/2023. ROS:  General ROS: negative for - chills, fatigue, fever or night sweats, weight loss  Respiratory ROS: no cough, shortness of breath, or wheezing  Cardiovascular ROS: no chest pain or dyspnea on exertion  Genito-Urinary ROS: no dysuria, trouble voiding, or hematuria  Musculoskeletal ROS: negative for - gait disturbance, joint pain or muscle pain  Neurological ROS: no TIA or stroke symptoms  GI ROS: see HPI  Skin ROS: no new rashes or lesions   Lymphatic ROS: no new adenopathy noted by pt.    GYN ROS: see HPI, no new GYN history or bleeding noted  Psy ROS: no new mental or behavioral disturbances         Patient Active Problem List   Diagnosis   • Hypertension   • Encounter for screening mammogram for malignant neoplasm of breast   • Hemorrhoids   • Painful urination   • Constipation   • Urinary frequency   • Weight loss   • Other fatigue   • Pelvic pain   • Asymptomatic menopause   • Bilateral groin pain   • High serum ferritin   • Abdominal wall hernia   • Perforated diverticulum   • Left leg swelling   • Colovesical fistula   • Diverticulitis of colon with perforation   • Appendicitis   • Chronic cholecystitis due to gallbladder calculus with obstruction   • History of tobacco use   • Left ovarian cyst   • Adrenal nodule (HCC)   • Acute respiratory failure with hypoxia (HCC)   • Anemia       Allergies:  Acetaminophen and Penicillins      Current Outpatient Medications:   •  oxygen gas, Inhale 4 L/min continuous. 4l at rest and 6l on exertion  Indications: sob, Disp: , Rfl:     Past Medical History:   Diagnosis Date   • Breathing difficulty    • Hypertension        Past Surgical History:   Procedure Laterality Date   • APPENDECTOMY N/A 08/12/2023    Procedure: APPENDECTOMY;  Surgeon: Elisabeth Panchal DO;  [facility];  Service: General   • CHOLECYSTECTOMY N/A 08/12/2023    Procedure: CHOLECYSTECTOMY;  Surgeon: Elisabeth Panchal DO;  [facility];  Service: General   • COLON SURGERY     • CT CYSTOGRAM  08/23/2023   • HARTMANS PROCEDURE N/A 08/12/2023    Procedure: Melburn Ruths;  Surgeon: Elisabeth Panchal DO;  [facility];  Service: General   • LAPAROTOMY N/A 08/12/2023    Procedure: LAPAROTOMY EXPLORATORY, TAKEDOWN OF COLOVESICLE FISTULA, REPAIR OF EPIGASTRIC HERNIA;  Surgeon: Elisabeth Panchal DO;  [facility];  Service: General       No family history on file. reports that she has been smoking cigarettes. She has a 10.00 pack-year smoking history. She has never used smokeless tobacco. She reports that she does not drink alcohol and does not use drugs. PHYSICAL EXAM    There were no vitals taken for this visit.     General: normal, cooperative, no distress  Abdominal: soft, nondistended or nontender  Incision: clean, dry, and intact and healing well.   Pink thin appearing left-sided colostomy      Jhony Marin, DO    Date: 9/20/2023 Time: 12:16 PM

## 2023-09-21 ENCOUNTER — HOME CARE VISIT (OUTPATIENT)
Dept: HOME HEALTH SERVICES | Facility: HOME HEALTHCARE | Age: 63
End: 2023-09-21
Payer: COMMERCIAL

## 2023-09-21 VITALS
SYSTOLIC BLOOD PRESSURE: 142 MMHG | OXYGEN SATURATION: 99 % | TEMPERATURE: 97.2 F | HEART RATE: 84 BPM | RESPIRATION RATE: 20 BRPM | DIASTOLIC BLOOD PRESSURE: 78 MMHG

## 2023-09-21 PROCEDURE — G0299 HHS/HOSPICE OF RN EA 15 MIN: HCPCS

## 2023-11-11 ENCOUNTER — VBI (OUTPATIENT)
Dept: ADMINISTRATIVE | Facility: OTHER | Age: 63
End: 2023-11-11

## 2023-11-15 ENCOUNTER — OFFICE VISIT (OUTPATIENT)
Dept: SURGERY | Facility: CLINIC | Age: 63
End: 2023-11-15
Payer: COMMERCIAL

## 2023-11-15 DIAGNOSIS — Z93.3 COLOSTOMY IN PLACE (HCC): Primary | ICD-10-CM

## 2023-11-15 PROCEDURE — 99212 OFFICE O/P EST SF 10 MIN: CPT | Performed by: SURGERY

## 2023-11-15 NOTE — PROGRESS NOTES
Assessment/Plan:    Diagnoses and all orders for this visit:    Colostomy in place [facility])    May have some slight prolapse of 2 to 3 cm. It is working quite well. Plans are to return in January to discuss colostomy reversal.    Has not seen a gynecologist as previously requested. She has an ovarian cyst and I want a gynecologic opinion whether they need to be there at the time of her colostomy reversal.        Postoperative restrictions reviewed. All questions answered. ______________________________________________________  HPI: Patient presents for follow up. S/P hartmanns procedure, laparotomy exploratory, takedown of colovesicle fistula, repair of epigastric hernia, appendectomy, cholecystectomy 8/13/2023. She felt that the colostomy seems to be protruding more. It is functioning fine. She is eating better. She is staying active. She has not seen her gynecologist.               Frank Giancarlo:  General ROS: negative for - chills, fatigue, fever or night sweats, weight loss  Respiratory ROS: no cough, shortness of breath, or wheezing  Cardiovascular ROS: no chest pain or dyspnea on exertion  Genito-Urinary ROS: no dysuria, trouble voiding, or hematuria  Musculoskeletal ROS: negative for - gait disturbance, joint pain or muscle pain  Neurological ROS: no TIA or stroke symptoms  GI ROS: see HPI  Skin ROS: no new rashes or lesions   Lymphatic ROS: no new adenopathy noted by pt.    GYN ROS: see HPI, no new GYN history or bleeding noted  Psy ROS: no new mental or behavioral disturbances         Patient Active Problem List   Diagnosis    Hypertension    Encounter for screening mammogram for malignant neoplasm of breast    Hemorrhoids    Painful urination    Constipation    Urinary frequency    Weight loss    Other fatigue    Pelvic pain    Asymptomatic menopause    Bilateral groin pain    High serum ferritin    Abdominal wall hernia    Perforated diverticulum    Left leg swelling    Colovesical fistula Diverticulitis of colon with perforation    Appendicitis    Chronic cholecystitis due to gallbladder calculus with obstruction    History of tobacco use    Left ovarian cyst    Adrenal nodule (HCC)    Acute respiratory failure with hypoxia (HCC)    Anemia       Allergies:  Acetaminophen and Penicillins      Current Outpatient Medications:     oxygen gas, Inhale 4 L/min continuous. 4l at rest and 6l on exertion  Indications: sob, Disp: , Rfl:     Past Medical History:   Diagnosis Date    Breathing difficulty     Hypertension        Past Surgical History:   Procedure Laterality Date    APPENDECTOMY N/A 08/12/2023    Procedure: APPENDECTOMY;  Surgeon: Shannan Panchal DO;  [facility];  Service: General    CHOLECYSTECTOMY N/A 08/12/2023    Procedure: CHOLECYSTECTOMY;  Surgeon: Shannan Panchal DO;  [facility];  Service: General    COLON SURGERY      CT CYSTOGRAM  08/23/2023    HARTMANS PROCEDURE N/A 08/12/2023    Procedure: Clotilde Commander;  Surgeon: Shannan Panchal DO;  [facility];  Service: General    LAPAROTOMY N/A 08/12/2023    Procedure: LAPAROTOMY EXPLORATORY, TAKEDOWN OF COLOVESICLE FISTULA, REPAIR OF EPIGASTRIC HERNIA;  Surgeon: Shannan Panchal DO;  [facility];  Service: General       No family history on file. reports that she has been smoking cigarettes. She has a 10.00 pack-year smoking history. She has never used smokeless tobacco. She reports that she does not drink alcohol and does not use drugs. PHYSICAL EXAM    There were no vitals taken for this visit. General: normal, cooperative, no distress  Abdominal: soft, nondistended, or nontender  Incision: clean, dry, and intact and healing well. No signs of a hernia. Slight protrusion of the colostomy about 2 to 3 cm out from the skin.   Soft brown stool noted within the bag      Atilio Soto DO    Date: 11/15/2023 Time: 12:54 PM

## 2023-11-15 NOTE — LETTER
November 15, 2023     FRANCISCA Lazaro  [address] [facility] [address]    Patient: Rangel Patton   YOB: 1960   Date of Visit: 11/15/2023       Dear Dr. Nga Addison:    Thank you for referring Rangel Patton to me for evaluation. Below are my notes for this consultation. If you have questions, please do not hesitate to call me. I look forward to following your patient along with you. Sincerely,        Era Panchal DO        CC: No Recipients    Era mercado DO  11/15/2023 12:56 PM  Sign when Signing Visit  Assessment/Plan:    Diagnoses and all orders for this visit:    Colostomy in place [facility])    May have some slight prolapse of 2 to 3 cm. It is working quite well. Plans are to return in January to discuss colostomy reversal.    Has not seen a gynecologist as previously requested. She has an ovarian cyst and I want a gynecologic opinion whether they need to be there at the time of her colostomy reversal.        Postoperative restrictions reviewed. All questions answered. ______________________________________________________  HPI: Patient presents for follow up. S/P hartmanns procedure, laparotomy exploratory, takedown of colovesicle fistula, repair of epigastric hernia, appendectomy, cholecystectomy 8/13/2023. She felt that the colostomy seems to be protruding more. It is functioning fine. She is eating better. She is staying active.   She has not seen her gynecologist.               Jaime Mcelroy:  General ROS: negative for - chills, fatigue, fever or night sweats, weight loss  Respiratory ROS: no cough, shortness of breath, or wheezing  Cardiovascular ROS: no chest pain or dyspnea on exertion  Genito-Urinary ROS: no dysuria, trouble voiding, or hematuria  Musculoskeletal ROS: negative for - gait disturbance, joint pain or muscle pain  Neurological ROS: no TIA or stroke symptoms  GI ROS: see HPI  Skin ROS: no new rashes or lesions Lymphatic ROS: no new adenopathy noted by pt. GYN ROS: see HPI, no new GYN history or bleeding noted  Psy ROS: no new mental or behavioral disturbances         Patient Active Problem List   Diagnosis   • Hypertension   • Encounter for screening mammogram for malignant neoplasm of breast   • Hemorrhoids   • Painful urination   • Constipation   • Urinary frequency   • Weight loss   • Other fatigue   • Pelvic pain   • Asymptomatic menopause   • Bilateral groin pain   • High serum ferritin   • Abdominal wall hernia   • Perforated diverticulum   • Left leg swelling   • Colovesical fistula   • Diverticulitis of colon with perforation   • Appendicitis   • Chronic cholecystitis due to gallbladder calculus with obstruction   • History of tobacco use   • Left ovarian cyst   • Adrenal nodule (HCC)   • Acute respiratory failure with hypoxia (HCC)   • Anemia       Allergies:  Acetaminophen and Penicillins      Current Outpatient Medications:   •  oxygen gas, Inhale 4 L/min continuous. 4l at rest and 6l on exertion  Indications: sob, Disp: , Rfl:     Past Medical History:   Diagnosis Date   • Breathing difficulty    • Hypertension        Past Surgical History:   Procedure Laterality Date   • APPENDECTOMY N/A 08/12/2023    Procedure: APPENDECTOMY;  Surgeon: Stefani Panchal DO;  [facility];  Service: General   • CHOLECYSTECTOMY N/A 08/12/2023    Procedure: CHOLECYSTECTOMY;  Surgeon: Stefani Panchal DO;  [facility];  Service: General   • COLON SURGERY     • CT CYSTOGRAM  08/23/2023   • HARTMANS PROCEDURE N/A 08/12/2023    Procedure: Viktor Ceja;  Surgeon: Stefani Panchal DO;  [facility];  Service: General   • LAPAROTOMY N/A 08/12/2023    Procedure: LAPAROTOMY EXPLORATORY, TAKEDOWN OF COLOVESICLE FISTULA, REPAIR OF EPIGASTRIC HERNIA;  Surgeon: Stefani Panchal DO;  [facility];  Service: General       No family history on file.      reports that she has been smoking cigarettes. She has a 10.00 pack-year smoking history. She has never used smokeless tobacco. She reports that she does not drink alcohol and does not use drugs. PHYSICAL EXAM    There were no vitals taken for this visit. General: normal, cooperative, no distress  Abdominal: soft, nondistended, or nontender  Incision: clean, dry, and intact and healing well. No signs of a hernia. Slight protrusion of the colostomy about 2 to 3 cm out from the skin.   Soft brown stool noted within the bag      Chely Gamble,     Date: 11/15/2023 Time: 12:54 PM

## 2024-01-02 NOTE — PHYSICAL THERAPY NOTE
Physical Therapy Cancellation Note       08/13/23 1536   Note Type   Note type Cancelled Session   Cancel Reasons Medical status   Additional Comments referral received for PT eval and tx. attempted to see pt for PT intervention but Suzette COLVIN reports pt is not appropriate due to weakness and overall medical status. will follow and initiate PT as appropriate.      Elvis Rivas, PT No.

## 2024-01-03 ENCOUNTER — VBI (OUTPATIENT)
Dept: ADMINISTRATIVE | Facility: OTHER | Age: 64
End: 2024-01-03

## 2024-08-10 ENCOUNTER — OFFICE VISIT (OUTPATIENT)
Dept: URGENT CARE | Facility: MEDICAL CENTER | Age: 64
End: 2024-08-10
Payer: MEDICARE

## 2024-08-10 VITALS
DIASTOLIC BLOOD PRESSURE: 74 MMHG | SYSTOLIC BLOOD PRESSURE: 164 MMHG | BODY MASS INDEX: 35.27 KG/M2 | TEMPERATURE: 97.9 F | WEIGHT: 163 LBS | RESPIRATION RATE: 18 BRPM | OXYGEN SATURATION: 97 % | HEART RATE: 104 BPM

## 2024-08-10 DIAGNOSIS — L03.319 CELLULITIS OF TRUNK, UNSPECIFIED SITE OF TRUNK: Primary | ICD-10-CM

## 2024-08-10 PROCEDURE — G0463 HOSPITAL OUTPT CLINIC VISIT: HCPCS | Performed by: PHYSICIAN ASSISTANT

## 2024-08-10 PROCEDURE — 99213 OFFICE O/P EST LOW 20 MIN: CPT | Performed by: PHYSICIAN ASSISTANT

## 2024-08-10 RX ORDER — CLINDAMYCIN HYDROCHLORIDE 300 MG/1
300 CAPSULE ORAL 3 TIMES DAILY
Qty: 21 CAPSULE | Refills: 0 | Status: SHIPPED | OUTPATIENT
Start: 2024-08-10 | End: 2024-08-17

## 2024-08-10 NOTE — PROGRESS NOTES
[facility]        NAME: Mona Armendariz is a 64 y.o. female  : 1960    MRN: [id number]  DATE: August 10, 2024  TIME: 6:03 PM    Assessment and Plan   Cellulitis of trunk, unspecified site of trunk [L03.319]  1. Cellulitis of trunk, unspecified site of trunk  clindamycin (CLEOCIN) 300 MG capsule            Patient Instructions     Keep skin clean and dry  Take Clindamycin 300mg 3x daily x 7 days  Acidophilus daily while on Clindamycin  Recommend follow-up with surgery       If tests have been performed at [facility], our office will contact you with results if changes need to be made to the care plan discussed with you at the visit.  You can review your full results on [facility].    Chief Complaint     Chief Complaint   Patient presents with    Skin Problem    Swelling     Patient has redness, swelling around colostomy; states it has been present for a year for hx of diverticulitis; concerned about local infection           History of Present Illness       Mona is a 64-year-old female who presents with a 2-day history of redness and irritation around her colostomy site.  Patient reports she started with discomfort last evening but reports her pain and redness worsened over the past 24 hours.  She denies any fever, chills or systemic symptoms.  Patient reports her skin is painful, she denies any itching or sensitivity.  She reports that use of no new cleaning products, soaps, detergents or adhesives.  She does report allergy to both penicillins and cephalosporins.        Review of Systems   Review of Systems   Constitutional: Negative.    Gastrointestinal: Negative.    Skin:  Positive for color change.         Current Medications       Current Outpatient Medications:     clindamycin (CLEOCIN) 300 MG capsule, Take 1 capsule (300 mg total) by mouth 3 (three) times a day for 7 days, Disp: 21 capsule, Rfl: 0    oxygen gas, Inhale 4 L/min continuous. 4l at rest and 6l on exertion   Indications: sob, Disp: , Rfl:     Current Allergies     Allergies as of 08/10/2024 - Reviewed 08/10/2024   Allergen Reaction Noted    Acetaminophen Hives 03/16/2018    Penicillins Hives 03/16/2018            The following portions of the patient's history were reviewed and updated as appropriate: allergies, current medications, past family history, past medical history, past social history, past surgical history and problem list.     Past Medical History:   Diagnosis Date    Breathing difficulty     Hypertension        Past Surgical History:   Procedure Laterality Date    APPENDECTOMY N/A 08/12/2023    Procedure: APPENDECTOMY;  Surgeon: Sammy Panchal DO;  [facility];  Service: General    CHOLECYSTECTOMY N/A 08/12/2023    Procedure: CHOLECYSTECTOMY;  Surgeon: Sammy Panchal DO;  [facility];  Service: General    COLON SURGERY      CT CYSTOGRAM  08/23/2023    HARTMANS PROCEDURE N/A 08/12/2023    Procedure: HARTMANS PROCEDURE;  Surgeon: Sammy Panchal DO;  [facility];  Service: General    LAPAROTOMY N/A 08/12/2023    Procedure: LAPAROTOMY EXPLORATORY, TAKEDOWN OF COLOVESICLE FISTULA, REPAIR OF EPIGASTRIC HERNIA;  Surgeon: Sammy Panchal DO;  [facility];  Service: General       History reviewed. No pertinent family history.      Medications have been verified.        Objective   /74   Pulse 104   Temp 97.9 °F (36.6 °C) (Temporal)   Resp 18   Wt 73.9 kg (163 lb)   SpO2 97%   BMI 35.27 kg/m²   No LMP recorded. Patient is postmenopausal.       Physical Exam     Physical Exam  Constitutional:       General: She is not in acute distress.     Appearance: Normal appearance. She is not ill-appearing.   Cardiovascular:      Rate and Rhythm: Normal rate and regular rhythm.      Heart sounds: Normal heart sounds. No murmur heard.  Pulmonary:      Effort: Pulmonary effort is normal.      Breath sounds: Normal breath sounds.   Abdominal:      General:  Abdomen is protuberant. Bowel sounds are normal.      Palpations: Abdomen is soft.   Skin:     Comments: There is diffuse erythema, slight tenderness to palpation warm to touch on the left lower abdomen surrounding the colostomy site.  No blisters, hives or bulla.  No active skin drainage.   Neurological:      Mental Status: She is alert.

## 2024-08-10 NOTE — PATIENT INSTRUCTIONS
Keep skin clean and dry  Take Clindamycin 300mg 3x daily x 7 days  Acidophilus daily while on Clindamycin  Recommend follow-up with surgery

## 2024-08-12 ENCOUNTER — TELEPHONE (OUTPATIENT)
Age: 64
End: 2024-08-12

## 2024-08-12 NOTE — TELEPHONE ENCOUNTER
Patient called in stating she went to urgent care a few days ago for an infection around her stoma, warm transferred to CTS for triage   
Pt of Dr. Panchal w/ colostomy s/p hartmanns procedure 8/12-  Top of stoma started to hurt like a burning pain over the weekend, became red and warm on the skin in that area. Went to urgent care Saturday and was prescribed clindamycin abx and probiotic.   Was told to call for appointment. Pt has appointment 8/28.    Pt states area is decreasing in redness, seems to be improving.   Pt concerned she is allergic to the tape. Pt states currently she has burning pain 2/10 with touch only.  Denies open areas, drainage, or blistering under the tape.   Stoma pink. + output.     Advised pt to continue medications as prescribed and call back if worsening pain, redness, fever, or new symptoms or concerns.   
normal

## 2024-08-18 ENCOUNTER — APPOINTMENT (OUTPATIENT)
Dept: URGENT CARE | Facility: MEDICAL CENTER | Age: 64
End: 2024-08-18

## 2024-08-28 ENCOUNTER — TELEPHONE (OUTPATIENT)
Age: 64
End: 2024-08-28

## 2024-08-28 ENCOUNTER — OFFICE VISIT (OUTPATIENT)
Dept: SURGERY | Facility: CLINIC | Age: 64
End: 2024-08-28
Payer: COMMERCIAL

## 2024-08-28 VITALS
OXYGEN SATURATION: 86 % | HEART RATE: 75 BPM | WEIGHT: 162 LBS | SYSTOLIC BLOOD PRESSURE: 174 MMHG | HEIGHT: 57 IN | DIASTOLIC BLOOD PRESSURE: 101 MMHG | BODY MASS INDEX: 34.95 KG/M2

## 2024-08-28 DIAGNOSIS — E66.01 OBESITY, MORBID (HCC): ICD-10-CM

## 2024-08-28 DIAGNOSIS — I10 HYPERTENSION, UNSPECIFIED TYPE: Primary | ICD-10-CM

## 2024-08-28 DIAGNOSIS — N83.202 LEFT OVARIAN CYST: ICD-10-CM

## 2024-08-28 DIAGNOSIS — Z93.3 COLOSTOMY IN PLACE (HCC): Primary | ICD-10-CM

## 2024-08-28 PROCEDURE — 99213 OFFICE O/P EST LOW 20 MIN: CPT | Performed by: SURGERY

## 2024-08-28 RX ORDER — LOSARTAN POTASSIUM 50 MG/1
50 TABLET ORAL DAILY
Qty: 90 TABLET | Refills: 0 | Status: SHIPPED | OUTPATIENT
Start: 2024-08-28

## 2024-08-28 NOTE — LETTER
August 28, 2024     Imani Lerma, FRANCISCA  [address] [id number]    Patient: Mona Armendariz   YOB: 1960   Date of Visit: 8/28/2024       Dear Dr. Lerma:    Thank you for referring Mona Armendariz to me for evaluation. Below are my notes for this consultation.    If you have questions, please do not hesitate to call me. I look forward to following your patient along with you.         Sincerely,        Sammy Panchal, DO        CC: Kathy Grace, DO    Sammy Panchal,   8/28/2024 12:49 PM  Sign when Signing Visit  Assessment/Plan:    Diagnoses and all orders for this visit:    Colostomy in place (HCC)    Discussion of the need for a preoperative colonoscopy prior to ostomy reversal.  Did explain the need for an exploratory laparotomy for her reversal.  He has yet to see a gynecologist.  I have given her the name of our [facility] team.  She did also wonder if this was a necessary procedure and could she keep the colostomy.  To be functioning well.  There is no medical reason that her colostomy cannot be reversed.  Just seems a little hesitant in terms of proceeding with another big operation.  She will get back to me after her GYN appointment.      Left ovarian cyst    Left ovarian cyst noted at the time of her emergent operation a year ago.  Has been having insurance issues and therefore has not proceeded to see a gynecologist.  I did asked that she see someone who operates at the [facility].  Suspect that removal of the left ovary would be indicated it would also make reversal an easier process.  I have given her the name of Dr. Cari Grace to see    I also asked that she attempt to stop tobacco use    Subjective:      Patient ID: Mona Armendariz is a 64 y.o. female.    Patient presents for consult to discuss colostomy reversal.    Year ago she underwent an emergent Ness's procedure for perforated diverticulitis.  At that  "time inflammation was also noted of the appendix as well as a large gallstone prompting an appendectomy and cholecystectomy.  She also had a colovesical fistula repaired at that time.  Large left ovarian cyst was noted during the procedure.    In general she has been doing well.  I have relayed repeated request to see gynecology in preparation for colostomy reversal.  She states she had some insurance issues which is delaying this.  She was to see someone on [facility] but I asked that she see someone over at the [facility].  Colostomy is working well.  She is currently smoking        The following portions of the patient's history were reviewed and updated as appropriate:     She  has a past medical history of Breathing difficulty and Hypertension.  She  has a past surgical history that includes HARTMANS PROCEDURE (N/A, 08/12/2023); LAPAROTOMY (N/A, 08/12/2023); Appendectomy (N/A, 08/12/2023); Cholecystectomy (N/A, 08/12/2023); CT cystogram (08/23/2023); and Colon surgery.  Her family history is not on file.  She  reports that she has been smoking cigarettes. She has a 10 pack-year smoking history. She has never used smokeless tobacco. She reports that she does not drink alcohol and does not use drugs.  Current Outpatient Medications   Medication Sig Dispense Refill   • oxygen gas Inhale 4 L/min continuous. 4l at rest and 6l on exertion  Indications: sob       No current facility-administered medications for this visit.     She is allergic to acetaminophen and penicillins..    Review of Systems   All other systems reviewed and are negative.        Objective:      BP (!) 174/101   Pulse 75   Ht 4' 9\" (1.448 m)   Wt 73.5 kg (162 lb)   SpO2 (!) 86%   BMI 35.06 kg/m²          Physical Exam  Constitutional:       General: She is not in acute distress.  HENT:      Head: Atraumatic.      Mouth/Throat:      Mouth: Mucous membranes are moist.   Eyes:      Extraocular Movements: Extraocular movements intact. "   Cardiovascular:      Rate and Rhythm: Normal rate.   Pulmonary:      Effort: Pulmonary effort is normal.   Abdominal:      General: Abdomen is flat.      Palpations: Abdomen is soft.      Comments: Well-healed midline incision.  Left lower quadrant colostomy.  Suspect parastomal hernia.  Ostomy is pink and healthy   Musculoskeletal:      Cervical back: Normal range of motion.   Skin:     General: Skin is warm and dry.      Comments: Some varicosities noted of her lower extremities   Neurological:      Mental Status: She is alert.

## 2024-08-28 NOTE — TELEPHONE ENCOUNTER
Patient called and was in the surgeons office.  Her BP was 171/101.  Its typically between 160-165.  She can not track it at home.  She did book for next week but wanted to see if you would like to call something in prior.  Pls advise

## 2024-08-28 NOTE — TELEPHONE ENCOUNTER
Patient called to check which pharmacy the pills were sent into since she called the pharmacy and was told they did not have the prescription. Patient was advised the [facility] was [facility] to [facility].  Patient will check with the pharmacy.

## 2024-08-28 NOTE — PROGRESS NOTES
Assessment/Plan:    Diagnoses and all orders for this visit:    Colostomy in place (HCC)    Discussion of the need for a preoperative colonoscopy prior to ostomy reversal.  Did explain the need for an exploratory laparotomy for her reversal.  He has yet to see a gynecologist.  I have given her the name of our [facility] team.  She did also wonder if this was a necessary procedure and could she keep the colostomy.  To be functioning well.  There is no medical reason that her colostomy cannot be reversed.  Just seems a little hesitant in terms of proceeding with another big operation.  She will get back to me after her GYN appointment.      Left ovarian cyst    Left ovarian cyst noted at the time of her emergent operation a year ago.  Has been having insurance issues and therefore has not proceeded to see a gynecologist.  I did asked that she see someone who operates at the [facility].  Suspect that removal of the left ovary would be indicated it would also make reversal an easier process.  I have given her the name of Dr. Cari Grace to see    I also asked that she attempt to stop tobacco use    Subjective:      Patient ID: Mona Armendariz is a 64 y.o. female.    Patient presents for consult to discuss colostomy reversal.    Year ago she underwent an emergent Ness's procedure for perforated diverticulitis.  At that time inflammation was also noted of the appendix as well as a large gallstone prompting an appendectomy and cholecystectomy.  She also had a colovesical fistula repaired at that time.  Large left ovarian cyst was noted during the procedure.    In general she has been doing well.  I have relayed repeated request to see gynecology in preparation for colostomy reversal.  She states she had some insurance issues which is delaying this.  She was to see someone on [facility] but I asked that she see someone over at the [facility].  Colostomy is working well.  She is currently  "smoking        The following portions of the patient's history were reviewed and updated as appropriate:     She  has a past medical history of Breathing difficulty and Hypertension.  She  has a past surgical history that includes HARTMANS PROCEDURE (N/A, 08/12/2023); LAPAROTOMY (N/A, 08/12/2023); Appendectomy (N/A, 08/12/2023); Cholecystectomy (N/A, 08/12/2023); CT cystogram (08/23/2023); and Colon surgery.  Her family history is not on file.  She  reports that she has been smoking cigarettes. She has a 10 pack-year smoking history. She has never used smokeless tobacco. She reports that she does not drink alcohol and does not use drugs.  Current Outpatient Medications   Medication Sig Dispense Refill    oxygen gas Inhale 4 L/min continuous. 4l at rest and 6l on exertion  Indications: sob       No current facility-administered medications for this visit.     She is allergic to acetaminophen and penicillins..    Review of Systems   All other systems reviewed and are negative.        Objective:      BP (!) 174/101   Pulse 75   Ht 4' 9\" (1.448 m)   Wt 73.5 kg (162 lb)   SpO2 (!) 86%   BMI 35.06 kg/m²          Physical Exam  Constitutional:       General: She is not in acute distress.  HENT:      Head: Atraumatic.      Mouth/Throat:      Mouth: Mucous membranes are moist.   Eyes:      Extraocular Movements: Extraocular movements intact.   Cardiovascular:      Rate and Rhythm: Normal rate.   Pulmonary:      Effort: Pulmonary effort is normal.   Abdominal:      General: Abdomen is flat.      Palpations: Abdomen is soft.      Comments: Well-healed midline incision.  Left lower quadrant colostomy.  Suspect parastomal hernia.  Ostomy is pink and healthy   Musculoskeletal:      Cervical back: Normal range of motion.   Skin:     General: Skin is warm and dry.      Comments: Some varicosities noted of her lower extremities   Neurological:      Mental Status: She is alert.         "

## 2024-08-28 NOTE — TELEPHONE ENCOUNTER
Giancarlo from [facility] called to confirm pt was seen by Dr Panchal.  She also said they are looking for pt records for 8/21/23-8/21/24 and wanted to confirm the fax number for the office is [phone].  She said they will fax the request to the office to be filled out and returned as soon as possible

## 2024-08-30 ENCOUNTER — OFFICE VISIT (OUTPATIENT)
Dept: FAMILY MEDICINE CLINIC | Facility: CLINIC | Age: 64
End: 2024-08-30
Payer: MEDICARE

## 2024-08-30 VITALS
WEIGHT: 164.2 LBS | OXYGEN SATURATION: 97 % | HEART RATE: 83 BPM | RESPIRATION RATE: 16 BRPM | TEMPERATURE: 97.5 F | BODY MASS INDEX: 35.42 KG/M2 | HEIGHT: 57 IN | SYSTOLIC BLOOD PRESSURE: 164 MMHG | DIASTOLIC BLOOD PRESSURE: 84 MMHG

## 2024-08-30 DIAGNOSIS — E27.8 ADRENAL NODULE (HCC): ICD-10-CM

## 2024-08-30 DIAGNOSIS — Z93.3 COLOSTOMY IN PLACE (HCC): ICD-10-CM

## 2024-08-30 DIAGNOSIS — I10 HYPERTENSION, UNSPECIFIED TYPE: Primary | ICD-10-CM

## 2024-08-30 DIAGNOSIS — Z00.00 HEALTHCARE MAINTENANCE: ICD-10-CM

## 2024-08-30 DIAGNOSIS — D64.9 ANEMIA, UNSPECIFIED TYPE: ICD-10-CM

## 2024-08-30 DIAGNOSIS — N83.202 LEFT OVARIAN CYST: ICD-10-CM

## 2024-08-30 DIAGNOSIS — Z12.31 ENCOUNTER FOR SCREENING MAMMOGRAM FOR BREAST CANCER: ICD-10-CM

## 2024-08-30 PROBLEM — J96.01 ACUTE RESPIRATORY FAILURE WITH HYPOXIA (HCC): Status: RESOLVED | Noted: 2023-08-31 | Resolved: 2024-08-30

## 2024-08-30 PROCEDURE — 99214 OFFICE O/P EST MOD 30 MIN: CPT | Performed by: NURSE PRACTITIONER

## 2024-08-30 PROCEDURE — G0438 PPPS, INITIAL VISIT: HCPCS | Performed by: NURSE PRACTITIONER

## 2024-08-30 NOTE — ASSESSMENT & PLAN NOTE
- Not well controlled.  - She recently restarted her losartan 50 mg daily.   - Recommend follow up in 1 month to monitor blood pressure.

## 2024-08-30 NOTE — ASSESSMENT & PLAN NOTE
- Incidentally noted 1.3 x 1.1 cm left adrenal nodule on CT abdomen/pelvis.   - Due for repeat CT.   - Consider referral to Endocrinology.

## 2024-08-30 NOTE — ASSESSMENT & PLAN NOTE
- Reviewed immunizations and screenings.   - Discussed regular dental and vision exams.   - She is planning on scheduling appointment with Gynecology.  - Mammogram ordered.  - She is due for colonoscopy which is going to be arranged by her general surgeon.

## 2024-08-30 NOTE — PROGRESS NOTES
Ambulatory Visit  Name: Mona Armendariz      : 1960      MRN: [id number]  Encounter Provider: FRANCISCA Theodore  Encounter Date: 2024   Encounter department: [facility]    Assessment & Plan   1. Hypertension, unspecified type  Assessment & Plan:  - Not well controlled.  - She recently restarted her losartan 50 mg daily.   - Recommend follow up in 1 month to monitor blood pressure.   Orders:  -     CBC and differential; Future  -     Comprehensive metabolic panel; Future  -     Lipid Panel with Direct LDL reflex; Future  -     TSH, 3rd generation with Free T4 reflex; Future  2. Adrenal nodule (HCC)  Assessment & Plan:  - Incidentally noted 1.3 x 1.1 cm left adrenal nodule on CT abdomen/pelvis.   - Due for repeat CT.   - Consider referral to Endocrinology.   3. Anemia, unspecified type  Assessment & Plan:  - Due for updated CBC and iron panel.     Orders:  -     CBC and differential; Future  -     Iron Panel (Includes Ferritin, Iron Sat%, Iron, and TIBC); Future  4. Colostomy in place (HCC)  Assessment & Plan:  - Functioning properly.  - Continue follow up with General Surgery.   5. Left ovarian cyst  Assessment & Plan:  - Follow up with Gynecology.   6. Healthcare maintenance  Assessment & Plan:  - Reviewed immunizations and screenings.   - Discussed regular dental and vision exams.   - She is planning on scheduling appointment with Gynecology.  - Mammogram ordered.  - She is due for colonoscopy which is going to be arranged by her general surgeon.   Orders:  -     CBC and differential; Future  -     Comprehensive metabolic panel; Future  -     Lipid Panel with Direct LDL reflex; Future  -     TSH, 3rd generation with Free T4 reflex; Future  7. Encounter for screening mammogram for breast cancer  -     Mammo screening bilateral w 3d & cad; Future; Expected date: 2024         History of Present Illness     Patient with PMH of HTN presents to office today for  follow up and physical. She was seen last in August of 2023 after undergoing surgery for colon resection for perforated diverticulum. Also underwent appendectomy and cholecystectomy. She currently has colostomy. Is following with General Surgery. Also had incidental finding of left adrenal nodule that was noted on CT that was due for follow up in February. She plans on scheduling. She is due for updated blood work. States that her blood pressure has been elevated. She was previously on losartan but it was discontinued due to hypotension. She did gain weight recently which is likely reason for elevated blood pressure. She just started taking her losartan again a few days ago. She denies any other concerns or complaints today.          Review of Systems   Constitutional:  Negative for fatigue and fever.   HENT:  Negative for congestion, postnasal drip and trouble swallowing.    Eyes:  Negative for visual disturbance.   Respiratory:  Negative for cough and shortness of breath.    Cardiovascular:  Negative for chest pain and palpitations.   Gastrointestinal:  Negative for abdominal pain and blood in stool.   Endocrine: Negative for cold intolerance and heat intolerance.   Genitourinary:  Negative for difficulty urinating, dysuria and frequency.   Musculoskeletal:  Negative for gait problem.   Skin:  Negative for rash.   Neurological:  Negative for dizziness, syncope and headaches.   Hematological:  Negative for adenopathy.   Psychiatric/Behavioral:  Negative for behavioral problems.      Past Medical History:   Diagnosis Date   • Breathing difficulty    • Hypertension      Past Surgical History:   Procedure Laterality Date   • APPENDECTOMY N/A 08/12/2023    Procedure: APPENDECTOMY;  Surgeon: Sammy Panchal DO;  [facility];  Service: General   • CHOLECYSTECTOMY N/A 08/12/2023    Procedure: CHOLECYSTECTOMY;  Surgeon: Sammy Panchal DO;  [facility];  Service: General   • COLON SURGERY    "  • CT CYSTOGRAM  08/23/2023   • HARTMANS PROCEDURE N/A 08/12/2023    Procedure: HARTMANS PROCEDURE;  Surgeon: Sammy Panchal DO;  [facility];  Service: General   • LAPAROTOMY N/A 08/12/2023    Procedure: LAPAROTOMY EXPLORATORY, TAKEDOWN OF COLOVESICLE FISTULA, REPAIR OF EPIGASTRIC HERNIA;  Surgeon: Sammy Panchal DO;  [facility];  Service: General     History reviewed. No pertinent family history.  Social History     Tobacco Use   • Smoking status: Some Days     Current packs/day: 0.50     Average packs/day: 0.5 packs/day for 20.0 years (10.0 ttl pk-yrs)     Types: Cigarettes   • Smokeless tobacco: Never   Vaping Use   • Vaping status: Never Used   Substance and Sexual Activity   • Alcohol use: Never   • Drug use: Never   • Sexual activity: Yes     Partners: Male     Birth control/protection: None     Current Outpatient Medications on File Prior to Visit   Medication Sig   • losartan (COZAAR) 50 mg tablet Take 1 tablet (50 mg total) by mouth daily   • [DISCONTINUED] oxygen gas Inhale 4 L/min continuous. 4l at rest and 6l on exertion  Indications: sob     Allergies   Allergen Reactions   • Acetaminophen Hives   • Penicillins Hives     Immunization History   Administered Date(s) Administered   • COVID-19 MODERNA VACC 0.5 ML IM 03/27/2021, 04/24/2021, 12/17/2021     Objective     /84 (BP Location: Left arm, Patient Position: Sitting, Cuff Size: Large)   Pulse 83   Temp 97.5 °F (36.4 °C) (Tympanic)   Resp 16   Ht 4' 9\" (1.448 m)   Wt 74.5 kg (164 lb 3.2 oz)   SpO2 97%   BMI 35.53 kg/m²     Physical Exam  Vitals and nursing note reviewed.   Constitutional:       General: She is not in acute distress.     Appearance: Normal appearance. She is well-developed. She is not ill-appearing.   HENT:      Head: Normocephalic and atraumatic.      Right Ear: Tympanic membrane, ear canal and external ear normal.      Left Ear: Tympanic membrane, ear canal and external ear normal.      " Nose: Nose normal.      Mouth/Throat:      Mouth: Mucous membranes are moist.      Pharynx: No posterior oropharyngeal erythema.   Eyes:      Conjunctiva/sclera: Conjunctivae normal.      Pupils: Pupils are equal, round, and reactive to light.   Cardiovascular:      Rate and Rhythm: Normal rate and regular rhythm.      Heart sounds: Normal heart sounds.   Pulmonary:      Effort: Pulmonary effort is normal.      Breath sounds: Normal breath sounds.   Abdominal:      General: Bowel sounds are normal.      Palpations: Abdomen is soft.      Comments: Colostomy in place   Musculoskeletal:         General: Normal range of motion.      Cervical back: Normal range of motion.   Lymphadenopathy:      Cervical: No cervical adenopathy.   Skin:     General: Skin is warm and dry.   Neurological:      Mental Status: She is alert and oriented to person, place, and time.   Psychiatric:         Mood and Affect: Mood normal.         Behavior: Behavior normal.

## 2024-08-30 NOTE — ASSESSMENT & PLAN NOTE
- Follow up with Gynecology.    Albendazole Pregnancy And Lactation Text: This medication is Pregnancy Category C and it isn't known if it is safe during pregnancy. It is also excreted in breast milk.

## 2024-09-29 PROBLEM — Z00.00 HEALTHCARE MAINTENANCE: Status: RESOLVED | Noted: 2024-08-30 | Resolved: 2024-09-29

## 2024-10-02 ENCOUNTER — OFFICE VISIT (OUTPATIENT)
Dept: FAMILY MEDICINE CLINIC | Facility: CLINIC | Age: 64
End: 2024-10-02
Payer: MEDICARE

## 2024-10-02 VITALS
TEMPERATURE: 98 F | BODY MASS INDEX: 36.16 KG/M2 | HEART RATE: 70 BPM | HEIGHT: 57 IN | SYSTOLIC BLOOD PRESSURE: 158 MMHG | DIASTOLIC BLOOD PRESSURE: 118 MMHG | RESPIRATION RATE: 16 BRPM | WEIGHT: 167.6 LBS | OXYGEN SATURATION: 67 %

## 2024-10-02 DIAGNOSIS — I10 HYPERTENSION, UNSPECIFIED TYPE: Primary | ICD-10-CM

## 2024-10-02 DIAGNOSIS — N83.202 LEFT OVARIAN CYST: ICD-10-CM

## 2024-10-02 DIAGNOSIS — Z93.3 COLOSTOMY IN PLACE (HCC): ICD-10-CM

## 2024-10-02 PROCEDURE — 99214 OFFICE O/P EST MOD 30 MIN: CPT | Performed by: NURSE PRACTITIONER

## 2024-10-02 RX ORDER — LOSARTAN POTASSIUM 100 MG/1
100 TABLET ORAL DAILY
Qty: 90 TABLET | Refills: 1 | Status: SHIPPED | OUTPATIENT
Start: 2024-10-02 | End: 2024-12-31

## 2024-10-02 NOTE — PROGRESS NOTES
Ambulatory Visit  Name: Mona Armendariz      : 1960      MRN: [id number]  Encounter Provider: FRANCISCA Theodore  Encounter Date: 10/2/2024   Encounter department:  [facility]    Assessment & Plan  Hypertension, unspecified type  - Not well controlled.  - Will increase losartan to 100 mg daily. Discussed side effects.   - Continue to monitor blood pressure at home.   - Recommend follow u pin 4-6 weeks.   Orders:    losartan (COZAAR) 100 MG tablet; Take 1 tablet (100 mg total) by mouth daily    Colostomy in place (HCC)  - Functioning properly.  - Continue follow up with General Surgery.          Left ovarian cyst  - Follow up with Gynecology - has appt 10/7.               History of Present Illness     Patient presents to office today for 1 month follow up regarding hypertension. She was restarted on her losartan 50 mg daily. Her blood pressure is still elevated. Reports readings at home of 150-160 systolic. She did have some weight gain recently and thinks that's why her blood pressure is elevated. She did not get her blood work done yet. She denies any other concerns or complaints today.        Review of Systems   Constitutional:  Negative for fatigue and fever.   HENT:  Negative for trouble swallowing.    Eyes:  Negative for visual disturbance.   Respiratory:  Negative for cough and shortness of breath.    Cardiovascular:  Negative for chest pain and palpitations.   Gastrointestinal:  Negative for abdominal pain and blood in stool.   Endocrine: Negative for cold intolerance and heat intolerance.   Genitourinary:  Negative for difficulty urinating and dysuria.   Musculoskeletal:  Negative for gait problem.   Skin:  Negative for rash.   Neurological:  Negative for dizziness, syncope and headaches.   Hematological:  Negative for adenopathy.   Psychiatric/Behavioral:  Negative for behavioral problems.      Past Medical History:   Diagnosis Date    Breathing difficulty      "Hypertension      Past Surgical History:   Procedure Laterality Date    APPENDECTOMY N/A 08/12/2023    Procedure: APPENDECTOMY;  Surgeon: Sammy Panchal DO;  [facility];  Service: General    CHOLECYSTECTOMY N/A 08/12/2023    Procedure: CHOLECYSTECTOMY;  Surgeon: Sammy Panchal DO;  [facility];  Service: General    COLON SURGERY      CT CYSTOGRAM  08/23/2023    HARTMANS PROCEDURE N/A 08/12/2023    Procedure: HARTMANS PROCEDURE;  Surgeon: Sammy Panchal DO;  [facility];  Service: General    LAPAROTOMY N/A 08/12/2023    Procedure: LAPAROTOMY EXPLORATORY, TAKEDOWN OF COLOVESICLE FISTULA, REPAIR OF EPIGASTRIC HERNIA;  Surgeon: Sammy Panchal DO;  [facility];  Service: General     History reviewed. No pertinent family history.  Social History     Tobacco Use    Smoking status: Some Days     Current packs/day: 0.50     Average packs/day: 0.5 packs/day for 20.0 years (10.0 ttl pk-yrs)     Types: Cigarettes    Smokeless tobacco: Never   Vaping Use    Vaping status: Never Used   Substance and Sexual Activity    Alcohol use: Never    Drug use: Never    Sexual activity: Yes     Partners: Male     Birth control/protection: None     Current Outpatient Medications on File Prior to Visit   Medication Sig    [DISCONTINUED] losartan (COZAAR) 50 mg tablet Take 1 tablet (50 mg total) by mouth daily     Allergies   Allergen Reactions    Acetaminophen Hives    Penicillins Hives     Immunization History   Administered Date(s) Administered    COVID-19 MODERNA VACC 0.5 ML IM 03/27/2021, 04/24/2021, 12/17/2021     Objective     BP (!) 158/118 (BP Location: Left arm, Patient Position: Sitting, Cuff Size: Standard)   Pulse 70   Temp 98 °F (36.7 °C) (Tympanic)   Resp 16   Ht 4' 9\" (1.448 m)   Wt 76 kg (167 lb 9.6 oz)   SpO2 (!) 67%   BMI 36.27 kg/m²     Physical Exam  Vitals and nursing note reviewed.   Constitutional:       Appearance: Normal appearance. She is " well-developed.   HENT:      Head: Normocephalic and atraumatic.      Right Ear: External ear normal.      Left Ear: External ear normal.   Eyes:      Conjunctiva/sclera: Conjunctivae normal.   Cardiovascular:      Rate and Rhythm: Normal rate and regular rhythm.      Heart sounds: Normal heart sounds.   Pulmonary:      Effort: Pulmonary effort is normal.      Breath sounds: Normal breath sounds.   Musculoskeletal:         General: Normal range of motion.      Cervical back: Normal range of motion.   Skin:     General: Skin is warm and dry.   Neurological:      Mental Status: She is alert and oriented to person, place, and time.   Psychiatric:         Mood and Affect: Mood normal.         Behavior: Behavior normal.

## 2024-10-02 NOTE — ASSESSMENT & PLAN NOTE
- Not well controlled.  - Will increase losartan to 100 mg daily. Discussed side effects.   - Continue to monitor blood pressure at home.   - Recommend follow u pin 4-6 weeks.   Orders:    losartan (COZAAR) 100 MG tablet; Take 1 tablet (100 mg total) by mouth daily

## 2024-10-03 ENCOUNTER — APPOINTMENT (OUTPATIENT)
Dept: URGENT CARE | Facility: MEDICAL CENTER | Age: 64
End: 2024-10-03
Payer: OTHER MISCELLANEOUS

## 2024-10-03 PROCEDURE — 90715 TDAP VACCINE 7 YRS/> IM: CPT

## 2024-10-03 PROCEDURE — 99283 EMERGENCY DEPT VISIT LOW MDM: CPT | Performed by: PHYSICIAN ASSISTANT

## 2024-10-03 PROCEDURE — 90471 IMMUNIZATION ADMIN: CPT | Performed by: PHYSICIAN ASSISTANT

## 2024-10-03 PROCEDURE — G0382 LEV 3 HOSP TYPE B ED VISIT: HCPCS | Performed by: PHYSICIAN ASSISTANT

## 2024-10-07 ENCOUNTER — APPOINTMENT (OUTPATIENT)
Dept: URGENT CARE | Facility: MEDICAL CENTER | Age: 64
End: 2024-10-07
Payer: OTHER MISCELLANEOUS

## 2024-10-07 ENCOUNTER — CONSULT (OUTPATIENT)
Dept: OBGYN CLINIC | Facility: CLINIC | Age: 64
End: 2024-10-07
Payer: MEDICARE

## 2024-10-07 VITALS
SYSTOLIC BLOOD PRESSURE: 156 MMHG | WEIGHT: 168 LBS | BODY MASS INDEX: 36.24 KG/M2 | HEIGHT: 57 IN | DIASTOLIC BLOOD PRESSURE: 84 MMHG

## 2024-10-07 DIAGNOSIS — Z93.3 COLOSTOMY IN PLACE (HCC): ICD-10-CM

## 2024-10-07 DIAGNOSIS — N83.202 LEFT OVARIAN CYST: Primary | ICD-10-CM

## 2024-10-07 PROCEDURE — 99203 OFFICE O/P NEW LOW 30 MIN: CPT | Performed by: PHYSICIAN ASSISTANT

## 2024-10-07 PROCEDURE — 99213 OFFICE O/P EST LOW 20 MIN: CPT

## 2024-10-07 NOTE — PROGRESS NOTES
Assessment/Plan:      Diagnoses and all orders for this visit:    Left ovarian cyst  -     Ambulatory Referral to Obstetrics / Gynecology  -     US pelvis complete w transvaginal; Future    Colostomy in place (HCC)  -     Ambulatory Referral to Obstetrics / Gynecology          Subjective:     Patient ID: Mona Armendariz is a 64 y.o. female.    Mona is a 63YO  Wf presenting to the office for a follow up for ovarian cyst seen on CT. Patient states that she had a perforated diverticulum leading to colostomy. She was found to have a 2.4cm simple ovarian cyst seen on the left ovary at that time. In order to get her colostomy reversed, her surgeon would like her to have an updated ultrasound to determine presence and quality of ovarian cyst.         Review of Systems   Constitutional:  Negative for fever.   Genitourinary:  Negative for pelvic pain, vaginal bleeding, vaginal discharge and vaginal pain.         Objective:     Physical Exam  Vitals reviewed.   Constitutional:       General: She is not in acute distress.     Appearance: Normal appearance. She is not ill-appearing, toxic-appearing or diaphoretic.   HENT:      Head: Normocephalic and atraumatic.   Skin:     General: Skin is dry.   Neurological:      General: No focal deficit present.      Mental Status: She is alert.   Psychiatric:         Mood and Affect: Mood normal.         Behavior: Behavior normal.           I have spent a total time of 30 minutes in caring for this patient on the day of the visit/encounter including Patient and family education, Documenting in the medical record, Reviewing / ordering tests, medicine, procedures  , and Obtaining or reviewing history  .

## 2024-11-14 ENCOUNTER — TELEPHONE (OUTPATIENT)
Dept: SURGERY | Facility: CLINIC | Age: 64
End: 2024-11-14

## 2024-11-15 ENCOUNTER — OFFICE VISIT (OUTPATIENT)
Dept: FAMILY MEDICINE CLINIC | Facility: CLINIC | Age: 64
End: 2024-11-15
Payer: MEDICARE

## 2024-11-15 VITALS
DIASTOLIC BLOOD PRESSURE: 84 MMHG | SYSTOLIC BLOOD PRESSURE: 160 MMHG | HEART RATE: 73 BPM | OXYGEN SATURATION: 98 % | BODY MASS INDEX: 36.8 KG/M2 | HEIGHT: 57 IN | TEMPERATURE: 98.3 F | WEIGHT: 170.6 LBS | RESPIRATION RATE: 16 BRPM

## 2024-11-15 DIAGNOSIS — I10 HYPERTENSION, UNSPECIFIED TYPE: Primary | ICD-10-CM

## 2024-11-15 DIAGNOSIS — Z12.31 SCREENING MAMMOGRAM, ENCOUNTER FOR: ICD-10-CM

## 2024-11-15 DIAGNOSIS — D64.9 ANEMIA, UNSPECIFIED TYPE: ICD-10-CM

## 2024-11-15 DIAGNOSIS — Z78.0 ASYMPTOMATIC MENOPAUSE: ICD-10-CM

## 2024-11-15 DIAGNOSIS — Z93.3 COLOSTOMY IN PLACE (HCC): ICD-10-CM

## 2024-11-15 PROCEDURE — 99214 OFFICE O/P EST MOD 30 MIN: CPT | Performed by: NURSE PRACTITIONER

## 2024-11-15 PROCEDURE — G2211 COMPLEX E/M VISIT ADD ON: HCPCS | Performed by: NURSE PRACTITIONER

## 2024-11-15 RX ORDER — HYDROCHLOROTHIAZIDE 12.5 MG/1
12.5 TABLET ORAL DAILY
Qty: 90 TABLET | Refills: 0 | Status: SHIPPED | OUTPATIENT
Start: 2024-11-15 | End: 2025-05-14

## 2024-11-15 NOTE — ASSESSMENT & PLAN NOTE
- Not well controlled.  - Contact losartan 100 mg daily.  - Will add hydrochlorothiazide 12.5 mg daily. Discussed side effects.   - Continue to monitor blood pressure at home.   - Recommend follow up in 3-4 weeks.   Orders:    hydroCHLOROthiazide 12.5 mg tablet; Take 1 tablet (12.5 mg total) by mouth daily

## 2024-11-15 NOTE — PROGRESS NOTES
Name: Mona Armendariz      : 1960      MRN: [id number]  Encounter Provider: FRANCISCA Theodore  Encounter Date: 11/15/2024   Encounter department: [facility]    Assessment & Plan  Hypertension, unspecified type  - Not well controlled.  - Contact losartan 100 mg daily.  - Will add hydrochlorothiazide 12.5 mg daily. Discussed side effects.   - Continue to monitor blood pressure at home.   - Recommend follow up in 3-4 weeks.   Orders:    hydroCHLOROthiazide 12.5 mg tablet; Take 1 tablet (12.5 mg total) by mouth daily    Colostomy in place (HCC)  - Functioning properly.  - Continue follow up with General Surgery.            Anemia, unspecified type  - Due for updated CBC and iron panel.          Asymptomatic menopause    Orders:    DXA bone density spine hip and pelvis; Future    Screening mammogram, encounter for    Orders:    Mammo screening bilateral w 3d and cad; Future      Depression Screening and Follow-up Plan: Patient was screened for depression during today's encounter. They screened negative with a PHQ-2 score of 0.    Urinary Incontinence Plan of Care: counseling topics discussed: use restroom every 2 hours, limit alcohol, caffeine, spicy foods, and acidic foods, preventing constipation and limiting fluid intake to 60 oz. per day.         History of Present Illness     Patient presents to office today for follow up regarding hypertension. Her losartan was increased to 100 mg daily. Her blood pressure is still uncontrolled. Reports systolic readings of 140-160s at home. Denies any other concerns or complaints today.           Review of Systems   Constitutional:  Negative for fatigue and fever.   HENT:  Negative for trouble swallowing.    Eyes:  Negative for visual disturbance.   Respiratory:  Negative for cough and shortness of breath.    Cardiovascular:  Negative for chest pain and palpitations.   Gastrointestinal:  Negative for abdominal pain and blood in stool.    Endocrine: Negative for cold intolerance and heat intolerance.   Genitourinary:  Negative for difficulty urinating and dysuria.   Musculoskeletal:  Negative for gait problem.   Skin:  Negative for rash.   Neurological:  Negative for dizziness, syncope and headaches.   Hematological:  Negative for adenopathy.   Psychiatric/Behavioral:  Negative for behavioral problems.      Past Medical History:   Diagnosis Date    Breathing difficulty     Hypertension      Past Surgical History:   Procedure Laterality Date    APPENDECTOMY N/A 08/12/2023    Procedure: APPENDECTOMY;  Surgeon: Sammy Panchal DO;  [facility];  Service: General    CHOLECYSTECTOMY N/A 08/12/2023    Procedure: CHOLECYSTECTOMY;  Surgeon: Sammy Panchal DO;  [facility];  Service: General    COLON SURGERY      CT CYSTOGRAM  08/23/2023    HARTMANS PROCEDURE N/A 08/12/2023    Procedure: HARTMANS PROCEDURE;  Surgeon: Sammy Panchal DO;  [facility];  Service: General    LAPAROTOMY N/A 08/12/2023    Procedure: LAPAROTOMY EXPLORATORY, TAKEDOWN OF COLOVESICLE FISTULA, REPAIR OF EPIGASTRIC HERNIA;  Surgeon: Sammy Panchal DO;  [facility];  Service: General     Family History   Problem Relation Age of Onset    No Known Problems Brother      Social History     Tobacco Use    Smoking status: Some Days     Current packs/day: 0.50     Average packs/day: 0.5 packs/day for 20.0 years (10.0 ttl pk-yrs)     Types: Cigarettes    Smokeless tobacco: Never   Vaping Use    Vaping status: Never Used   Substance and Sexual Activity    Alcohol use: Never    Drug use: Never    Sexual activity: Yes     Partners: Male     Birth control/protection: None     Current Outpatient Medications on File Prior to Visit   Medication Sig    losartan (COZAAR) 100 MG tablet Take 1 tablet (100 mg total) by mouth daily     Allergies   Allergen Reactions    Acetaminophen Hives    Penicillins Hives     Immunization History  "  Administered Date(s) Administered    COVID-19 MODERNA VACC 0.5 ML IM 03/27/2021, 04/24/2021, 12/17/2021     Objective   /84 (BP Location: Left arm, Patient Position: Sitting, Cuff Size: Standard)   Pulse 73   Temp 98.3 °F (36.8 °C) (Tympanic)   Resp 16   Ht 4' 9\" (1.448 m)   Wt 77.4 kg (170 lb 9.6 oz)   SpO2 98%   BMI 36.92 kg/m²     Physical Exam  Vitals and nursing note reviewed.   Constitutional:       Appearance: Normal appearance. She is well-developed.   HENT:      Head: Normocephalic and atraumatic.      Right Ear: External ear normal.      Left Ear: External ear normal.   Eyes:      Conjunctiva/sclera: Conjunctivae normal.   Cardiovascular:      Rate and Rhythm: Normal rate and regular rhythm.      Heart sounds: Normal heart sounds.   Pulmonary:      Effort: Pulmonary effort is normal.      Breath sounds: Normal breath sounds.   Musculoskeletal:         General: Normal range of motion.      Cervical back: Normal range of motion.   Skin:     General: Skin is warm and dry.   Neurological:      Mental Status: She is alert and oriented to person, place, and time.   Psychiatric:         Mood and Affect: Mood normal.         Behavior: Behavior normal.         "

## 2024-12-13 ENCOUNTER — OFFICE VISIT (OUTPATIENT)
Dept: FAMILY MEDICINE CLINIC | Facility: CLINIC | Age: 64
End: 2024-12-13
Payer: MEDICARE

## 2024-12-13 VITALS
BODY MASS INDEX: 36.89 KG/M2 | OXYGEN SATURATION: 99 % | TEMPERATURE: 97.2 F | DIASTOLIC BLOOD PRESSURE: 88 MMHG | RESPIRATION RATE: 16 BRPM | SYSTOLIC BLOOD PRESSURE: 138 MMHG | WEIGHT: 171 LBS | HEART RATE: 83 BPM | HEIGHT: 57 IN

## 2024-12-13 DIAGNOSIS — I10 HYPERTENSION, UNSPECIFIED TYPE: Primary | ICD-10-CM

## 2024-12-13 PROCEDURE — G2211 COMPLEX E/M VISIT ADD ON: HCPCS | Performed by: NURSE PRACTITIONER

## 2024-12-13 PROCEDURE — 99213 OFFICE O/P EST LOW 20 MIN: CPT | Performed by: NURSE PRACTITIONER

## 2024-12-13 NOTE — ASSESSMENT & PLAN NOTE
- Improving.  - Continue losartan 100 mg and hydrochlorothiazide 12.5 mg daily.   - Continue to monitor blood pressure at home.   - Recommend follow up in 3 months.

## 2024-12-13 NOTE — PROGRESS NOTES
Name: Mona Armendariz      : 1960      MRN: [id number]  Encounter Provider: FRANCISCA Theodore  Encounter Date: 2024   Encounter department: [facility]    Assessment & Plan  Hypertension, unspecified type  - Improving.  - Continue losartan 100 mg and hydrochlorothiazide 12.5 mg daily.   - Continue to monitor blood pressure at home.   - Recommend follow up in 3 months.             History of Present Illness     Patient presents to office today for follow up regarding hypertension. HCTZ was added to her medication regimen. Her blood pressure is improving. She has complaints today of runny nose and sneezing but otherwise feels well. Denies any other concerns or complaints today.          Review of Systems   Constitutional:  Negative for fatigue and fever.   HENT:  Positive for rhinorrhea and sneezing. Negative for trouble swallowing.    Eyes:  Negative for visual disturbance.   Respiratory:  Negative for cough and shortness of breath.    Cardiovascular:  Negative for chest pain and palpitations.   Gastrointestinal:  Negative for abdominal pain and blood in stool.   Endocrine: Negative for cold intolerance and heat intolerance.   Genitourinary:  Negative for difficulty urinating and dysuria.   Musculoskeletal:  Negative for gait problem.   Skin:  Negative for rash.   Neurological:  Negative for dizziness, syncope and headaches.   Hematological:  Negative for adenopathy.   Psychiatric/Behavioral:  Negative for behavioral problems.      Past Medical History:   Diagnosis Date   • Breathing difficulty    • Hypertension      Past Surgical History:   Procedure Laterality Date   • APPENDECTOMY N/A 2023    Procedure: APPENDECTOMY;  Surgeon: Sammy Panchal DO;  [facility];  Service: General   • CHOLECYSTECTOMY N/A 2023    Procedure: CHOLECYSTECTOMY;  Surgeon: Sammy Panchal DO;  [facility];  Service: General   • COLON SURGERY     •  "CT CYSTOGRAM  08/23/2023   • HARTMANS PROCEDURE N/A 08/12/2023    Procedure: HARTMANS PROCEDURE;  Surgeon: Sammy Panchal DO;  [facility];  Service: General   • LAPAROTOMY N/A 08/12/2023    Procedure: LAPAROTOMY EXPLORATORY, TAKEDOWN OF COLOVESICLE FISTULA, REPAIR OF EPIGASTRIC HERNIA;  Surgeon: Sammy Panchal DO;  [facility];  Service: General     Family History   Problem Relation Age of Onset   • No Known Problems Brother      Social History     Tobacco Use   • Smoking status: Some Days     Current packs/day: 0.50     Average packs/day: 0.5 packs/day for 20.0 years (10.0 ttl pk-yrs)     Types: Cigarettes   • Smokeless tobacco: Never   Vaping Use   • Vaping status: Never Used   Substance and Sexual Activity   • Alcohol use: Never   • Drug use: Never   • Sexual activity: Yes     Partners: Male     Birth control/protection: None     Current Outpatient Medications on File Prior to Visit   Medication Sig   • hydroCHLOROthiazide 12.5 mg tablet Take 1 tablet (12.5 mg total) by mouth daily   • losartan (COZAAR) 100 MG tablet Take 1 tablet (100 mg total) by mouth daily     Allergies   Allergen Reactions   • Acetaminophen Hives   • Penicillins Hives     Immunization History   Administered Date(s) Administered   • COVID-19 MODERNA VACC 0.5 ML IM 03/27/2021, 04/24/2021, 12/17/2021     Objective   /88 (BP Location: Left arm, Patient Position: Sitting, Cuff Size: Standard)   Pulse 83   Temp (!) 97.2 °F (36.2 °C) (Tympanic)   Resp 16   Ht 4' 9\" (1.448 m)   Wt 77.6 kg (171 lb)   SpO2 99%   BMI 37.00 kg/m²     Physical Exam  Vitals and nursing note reviewed.   Constitutional:       Appearance: Normal appearance. She is well-developed.   HENT:      Head: Normocephalic and atraumatic.      Right Ear: Tympanic membrane, ear canal and external ear normal.      Left Ear: Tympanic membrane, ear canal and external ear normal.      Nose: Rhinorrhea present.   Eyes:      Conjunctiva/sclera: " Conjunctivae normal.   Cardiovascular:      Rate and Rhythm: Normal rate and regular rhythm.      Heart sounds: Normal heart sounds.   Pulmonary:      Effort: Pulmonary effort is normal.      Breath sounds: Normal breath sounds.   Musculoskeletal:         General: Normal range of motion.      Cervical back: Normal range of motion.   Skin:     General: Skin is warm and dry.   Neurological:      Mental Status: She is alert and oriented to person, place, and time.   Psychiatric:         Mood and Affect: Mood normal.         Behavior: Behavior normal.

## 2025-01-29 ENCOUNTER — TELEPHONE (OUTPATIENT)
Age: 65
End: 2025-01-29

## 2025-01-29 NOTE — TELEPHONE ENCOUNTER
Magy from [employer] called for our fax number [phone].  She states they have an old order that is missing a signature and she will fax it over to be signed

## 2025-01-31 DIAGNOSIS — I10 HYPERTENSION, UNSPECIFIED TYPE: ICD-10-CM

## 2025-01-31 RX ORDER — HYDROCHLOROTHIAZIDE 12.5 MG/1
12.5 TABLET ORAL DAILY
Qty: 90 TABLET | Refills: 0 | Status: SHIPPED | OUTPATIENT
Start: 2025-01-31 | End: 2025-07-30

## 2025-02-12 ENCOUNTER — TELEPHONE (OUTPATIENT)
Age: 65
End: 2025-02-12

## 2025-02-12 NOTE — TELEPHONE ENCOUNTER
Patient calls with nasal congestion, chest congestion and cough. 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 No fever, sore throat or headache. Patient has been using SafeTussin with no relief. Patient is asking for an antibiotic and a prescription cough medication. Please send any prescriptions to [id number] - [place], [place] - [address] [address]  Phone: [phone]  Fax: [phone]     Patient can be reached at [phone].

## 2025-02-12 NOTE — TELEPHONE ENCOUNTER
I called pt and she is aware that she should schedule a visit. She will call back to schedule appointment

## 2025-02-17 ENCOUNTER — TELEPHONE (OUTPATIENT)
Age: 65
End: 2025-02-17

## 2025-02-17 NOTE — TELEPHONE ENCOUNTER
Pt called because she needs an updated script faxed to [facility] for ostomy supplies. She is using her last bag today and was unable to order more with out script.  Please call the patient to discuss.

## 2025-02-19 ENCOUNTER — OFFICE VISIT (OUTPATIENT)
Dept: FAMILY MEDICINE CLINIC | Facility: CLINIC | Age: 65
End: 2025-02-19
Payer: MEDICARE

## 2025-02-19 VITALS
OXYGEN SATURATION: 99 % | BODY MASS INDEX: 36.89 KG/M2 | TEMPERATURE: 98.3 F | RESPIRATION RATE: 16 BRPM | DIASTOLIC BLOOD PRESSURE: 94 MMHG | SYSTOLIC BLOOD PRESSURE: 140 MMHG | WEIGHT: 171 LBS | HEART RATE: 97 BPM | HEIGHT: 57 IN

## 2025-02-19 DIAGNOSIS — E66.01 OBESITY, MORBID (HCC): ICD-10-CM

## 2025-02-19 DIAGNOSIS — I10 PRIMARY HYPERTENSION: ICD-10-CM

## 2025-02-19 DIAGNOSIS — Z93.3 COLOSTOMY IN PLACE (HCC): ICD-10-CM

## 2025-02-19 DIAGNOSIS — M54.42 ACUTE LEFT-SIDED LOW BACK PAIN WITH LEFT-SIDED SCIATICA: Primary | ICD-10-CM

## 2025-02-19 DIAGNOSIS — M54.2 NECK PAIN: ICD-10-CM

## 2025-02-19 PROCEDURE — 99214 OFFICE O/P EST MOD 30 MIN: CPT | Performed by: FAMILY MEDICINE

## 2025-02-19 PROCEDURE — G2211 COMPLEX E/M VISIT ADD ON: HCPCS | Performed by: FAMILY MEDICINE

## 2025-02-19 RX ORDER — BACLOFEN 20 MG/1
20 TABLET ORAL 3 TIMES DAILY
Qty: 30 TABLET | Refills: 0 | Status: SHIPPED | OUTPATIENT
Start: 2025-02-19

## 2025-02-19 RX ORDER — PREDNISONE 50 MG/1
50 TABLET ORAL DAILY
Qty: 5 TABLET | Refills: 0 | Status: SHIPPED | OUTPATIENT
Start: 2025-02-19 | End: 2025-02-24

## 2025-02-19 NOTE — ASSESSMENT & PLAN NOTE
She was given prescriptions for prednisone and baclofen.  It was discussed with possible side effects.  Orders:  •  predniSONE 50 mg tablet; Take 1 tablet (50 mg total) by mouth daily for 5 days  •  baclofen 20 mg tablet; Take 1 tablet (20 mg total) by mouth 3 (three) times a day

## 2025-02-19 NOTE — PROGRESS NOTES
Name: Mona Armendariz      : 1960      MRN: [id number]  Encounter Provider: Ebony Delvalle MD  Encounter Date: 2025   [facility]    Assessment & Plan  Acute left-sided low back pain with left-sided sciatica  She was given prescriptions for prednisone and baclofen.  It was discussed with possible side effects.  Orders:  •  predniSONE 50 mg tablet; Take 1 tablet (50 mg total) by mouth daily for 5 days  •  baclofen 20 mg tablet; Take 1 tablet (20 mg total) by mouth 3 (three) times a day    Neck pain  She was given prescriptions for prednisone and baclofen.  It was discussed with possible side effects.       Colostomy in place (HCC)  - Functioning properly.  - Continue follow up with General Surgery.            Obesity, morbid (HCC)  He has discussed about low-carb diet and regular exercise.       Primary hypertension  Not well-controlled on hydrochlorothiazide 12.5 mg daily and losartan 100 mg daily.  Discussed with patient to monitor blood pressure at home and if it continues to be elevated we will adjust her medications.            History of Present Illness     She is here today with complaint of back and neck pain started 4 days ago.  She stated the lower back radiated to her left lower extremity and has been very hard for her to get out of bed to even make it to the bathroom.  She denies any recent history of injury or trauma.  She stated the neck pain is most on her left side and radiate to her scalp.  Denies any rash.  Denies any other neurologic symptoms.    Back Pain  Pertinent negatives include no abdominal pain, chest pain, dysuria, fever or headaches.   Neck Pain   Pertinent negatives include no chest pain, fever, headaches or trouble swallowing.     Review of Systems   Constitutional:  Negative for chills and fever.   HENT:  Negative for trouble swallowing.    Eyes:  Negative for visual disturbance.   Respiratory:  Negative for cough and  shortness of breath.    Cardiovascular:  Negative for chest pain, palpitations and leg swelling.   Gastrointestinal:  Negative for abdominal pain, constipation and diarrhea.   Endocrine: Negative for cold intolerance and heat intolerance.   Genitourinary:  Negative for difficulty urinating and dysuria.   Musculoskeletal:  Positive for back pain and neck pain. Negative for gait problem.   Skin:  Negative for rash.   Neurological:  Negative for dizziness, tremors, seizures and headaches.   Hematological:  Negative for adenopathy.   Psychiatric/Behavioral:  Negative for behavioral problems.      Past Medical History:   Diagnosis Date   • Breathing difficulty    • Hypertension      Past Surgical History:   Procedure Laterality Date   • APPENDECTOMY N/A 08/12/2023    Procedure: APPENDECTOMY;  Surgeon: Sammy Panchal DO;  [facility];  Service: General   • CHOLECYSTECTOMY N/A 08/12/2023    Procedure: CHOLECYSTECTOMY;  Surgeon: Sammy Panchal DO;  [facility];  Service: General   • COLON SURGERY     • CT CYSTOGRAM  08/23/2023   • HARTMANS PROCEDURE N/A 08/12/2023    Procedure: HARTMANS PROCEDURE;  Surgeon: Sammy Panchal DO;  [facility];  Service: General   • LAPAROTOMY N/A 08/12/2023    Procedure: LAPAROTOMY EXPLORATORY, TAKEDOWN OF COLOVESICLE FISTULA, REPAIR OF EPIGASTRIC HERNIA;  Surgeon: Sammy Panchal DO;  [facility];  Service: General     Family History   Problem Relation Age of Onset   • No Known Problems Brother      Social History     Tobacco Use   • Smoking status: Some Days     Current packs/day: 0.50     Average packs/day: 0.5 packs/day for 20.0 years (10.0 ttl pk-yrs)     Types: Cigarettes   • Smokeless tobacco: Never   Vaping Use   • Vaping status: Never Used   Substance and Sexual Activity   • Alcohol use: Never   • Drug use: Never   • Sexual activity: Yes     Partners: Male     Birth control/protection: None     Current Outpatient  "Medications on File Prior to Visit   Medication Sig   • hydroCHLOROthiazide 12.5 mg tablet Take 1 tablet (12.5 mg total) by mouth daily   • losartan (COZAAR) 100 MG tablet Take 1 tablet (100 mg total) by mouth daily     Allergies   Allergen Reactions   • Acetaminophen Hives   • Penicillins Hives     Immunization History   Administered Date(s) Administered   • COVID-19 MODERNA VACC 0.5 ML IM 03/27/2021, 04/24/2021, 12/17/2021     Objective   /94 (BP Location: Left arm, Patient Position: Sitting, Cuff Size: Large)   Pulse 97   Temp 98.3 °F (36.8 °C) (Tympanic)   Resp 16   Ht 4' 9\" (1.448 m)   Wt 77.6 kg (171 lb)   SpO2 99%   BMI 37.00 kg/m²     Physical Exam  Vitals and nursing note reviewed.   Constitutional:       Appearance: She is well-developed.   HENT:      Head: Normocephalic and atraumatic.   Eyes:      Pupils: Pupils are equal, round, and reactive to light.   Cardiovascular:      Rate and Rhythm: Normal rate and regular rhythm.      Heart sounds: Normal heart sounds.   Pulmonary:      Effort: Pulmonary effort is normal.      Breath sounds: Normal breath sounds.   Abdominal:      General: Bowel sounds are normal.      Palpations: Abdomen is soft.   Musculoskeletal:         General: Tenderness (back and neck) present.      Cervical back: Normal range of motion and neck supple.   Lymphadenopathy:      Cervical: No cervical adenopathy.   Skin:     General: Skin is warm.   Neurological:      Mental Status: She is alert and oriented to person, place, and time.         "

## 2025-02-19 NOTE — ASSESSMENT & PLAN NOTE
Not well-controlled on hydrochlorothiazide 12.5 mg daily and losartan 100 mg daily.  Discussed with patient to monitor blood pressure at home and if it continues to be elevated we will adjust her medications.

## 2025-02-19 NOTE — ASSESSMENT & PLAN NOTE
She was given prescriptions for prednisone and baclofen.  It was discussed with possible side effects.

## 2025-02-20 ENCOUNTER — TELEPHONE (OUTPATIENT)
Age: 65
End: 2025-02-20

## 2025-02-20 DIAGNOSIS — Z93.3 COLOSTOMY IN PLACE (HCC): Primary | ICD-10-CM

## 2025-02-20 NOTE — TELEPHONE ENCOUNTER
[facility] called in about patient Ostomy supplies. Please fax order to # [phone].    Also FAX # for [facility] is busy when they tried to fax over paperwork for patient. I advise them to try again and gave them the fax # again.

## 2025-02-20 NOTE — TELEPHONE ENCOUNTER
Patient called stating that she spoke to [facility] and was told that no script has been faxed as of yet. Patient is requesting a script for ostomy supplies. Please reach out to Patient for additional questions.

## 2025-02-20 NOTE — TELEPHONE ENCOUNTER
Patient called in to say that she got a call from [facility] saying that they received a script for her for her ostomy supplies but they do not carry those items.  She asked if someone from the office could please give her a call.  Thanks.

## 2025-02-21 ENCOUNTER — TELEPHONE (OUTPATIENT)
Age: 65
End: 2025-02-21

## 2025-02-21 DIAGNOSIS — K04.7 DENTAL INFECTION: Primary | ICD-10-CM

## 2025-02-21 RX ORDER — CLINDAMYCIN HYDROCHLORIDE 300 MG/1
300 CAPSULE ORAL 4 TIMES DAILY
Qty: 28 CAPSULE | Refills: 0 | Status: SHIPPED | OUTPATIENT
Start: 2025-02-21 | End: 2025-02-28

## 2025-02-21 NOTE — TELEPHONE ENCOUNTER
Patient contacted the office this morning in regards to asking if she can get an abx possibly for tooth infection. She couldn't get in contact with her dentist, having the tooth pain. She will come in if needed, was seen for a visit 02/19. If something could be called in until she sees her dentist please send to [facility].

## 2025-02-21 NOTE — TELEPHONE ENCOUNTER
Pt was seen on 2/19/25 and started with tooth pain. She was unable to get her dentist and would like an antibiotic sent to pharmacy.  Please advise   <<----- Click to add NO pertinent Past Medical History No pertinent past medical history

## 2025-02-21 NOTE — TELEPHONE ENCOUNTER
Patient asking for pain medication as well for her dental pain to be sent to the pharmacy for her.

## 2025-03-05 ENCOUNTER — OFFICE VISIT (OUTPATIENT)
Dept: URGENT CARE | Facility: MEDICAL CENTER | Age: 65
End: 2025-03-05
Payer: MEDICARE

## 2025-03-05 VITALS
WEIGHT: 173 LBS | BODY MASS INDEX: 37.32 KG/M2 | DIASTOLIC BLOOD PRESSURE: 99 MMHG | HEART RATE: 93 BPM | TEMPERATURE: 97.5 F | OXYGEN SATURATION: 99 % | SYSTOLIC BLOOD PRESSURE: 156 MMHG | HEIGHT: 57 IN | RESPIRATION RATE: 18 BRPM

## 2025-03-05 DIAGNOSIS — K08.89 PAIN, DENTAL: Primary | ICD-10-CM

## 2025-03-05 PROCEDURE — 99214 OFFICE O/P EST MOD 30 MIN: CPT | Performed by: PHYSICIAN ASSISTANT

## 2025-03-05 PROCEDURE — G0463 HOSPITAL OUTPT CLINIC VISIT: HCPCS | Performed by: PHYSICIAN ASSISTANT

## 2025-03-05 RX ORDER — METHYLPREDNISOLONE 4 MG/1
TABLET ORAL
Qty: 21 TABLET | Refills: 0 | Status: SHIPPED | OUTPATIENT
Start: 2025-03-05

## 2025-03-05 RX ORDER — AZITHROMYCIN 250 MG/1
TABLET, FILM COATED ORAL
Qty: 6 TABLET | Refills: 0 | Status: SHIPPED | OUTPATIENT
Start: 2025-03-05 | End: 2025-03-09

## 2025-03-05 NOTE — PATIENT INSTRUCTIONS
Dental pain  Medrol dose pack as directed  Will try muscle relaxant if no improvement with steroids  Follow up with PCP in 3-5 days.  Proceed to  ER if symptoms worsen.

## 2025-03-05 NOTE — PROGRESS NOTES
[facility]        NAME: Mona Armendariz is a 64 y.o. female  : 1960    MRN: [id number]  DATE: 2025  TIME: 2:18 PM    Assessment and Plan   Pain, dental [K08.89]  1. Pain, dental  methylPREDNISolone 4 MG tablet therapy pack            Patient Instructions     Dental pain  Medrol dose pack as directed  Will try muscle relaxant if no improvement with steroids  Follow up with PCP in 3-5 days.  Proceed to  ER if symptoms worsen.    Chief Complaint     Chief Complaint   Patient presents with    Neck Pain     Started 2 weeks ago woke up middle of night this sharp pain behind left ear going into the neck.  Has been taking advil every 4 hours, icing and hot showers.  Monday went to chiropractor he said she was tight but not causing this pain.  Went to dentist thought infection and PCP put her on antibiotics.  PCP said to finish antibiotics, gave muscle relaxer and pain medication.  The sharp pain has went away but still comes and goes, annoying feeling muffling, cracking in ears.         History of Present Illness       64-year-old female who presents complaining of pain to the left ear and left dental area x 10 days.  States she was seen previously and prescribed steroids and antibiotics with minimal relief.  Patient denies fevers, chills, nausea, vomiting    Neck Pain   Pertinent negatives include no chest pain.       Review of Systems   Review of Systems   Constitutional: Negative.    HENT: Negative.     Eyes: Negative.    Respiratory: Negative.  Negative for cough, chest tightness, shortness of breath, wheezing and stridor.    Cardiovascular: Negative.  Negative for chest pain, palpitations and leg swelling.   Musculoskeletal:  Positive for neck pain.         Current Medications       Current Outpatient Medications:     hydroCHLOROthiazide 12.5 mg tablet, Take 1 tablet (12.5 mg total) by mouth daily, Disp: 90 tablet, Rfl: 0    losartan (COZAAR) 100 MG tablet, Take 1 tablet (100 mg total)  by mouth daily, Disp: 90 tablet, Rfl: 1    methylPREDNISolone 4 MG tablet therapy pack, Use as directed on package, Disp: 21 tablet, Rfl: 0    Ostomy Supplies MISC, Use in the morning Please provide necessary pouches and other ostomy supplies for patient's colostomy, Disp: 90 each, Rfl: 10    Ostomy Supplies MISC, Use in the morning Please provide necessary osseous some applies including pouches and other material required for colostomy, Disp: 90 each, Rfl: 10    baclofen 20 mg tablet, Take 1 tablet (20 mg total) by mouth 3 (three) times a day (Patient not taking: Reported on 3/5/2025), Disp: 30 tablet, Rfl: 0    Current Allergies     Allergies as of 03/05/2025 - Reviewed 03/05/2025   Allergen Reaction Noted    Acetaminophen Hives 03/16/2018    Penicillins Hives 03/16/2018            The following portions of the patient's history were reviewed and updated as appropriate: allergies, current medications, past family history, past medical history, past social history, past surgical history and problem list.     Past Medical History:   Diagnosis Date    Breathing difficulty     Hypertension        Past Surgical History:   Procedure Laterality Date    APPENDECTOMY N/A 08/12/2023    Procedure: APPENDECTOMY;  Surgeon: Sammy Panchal DO;  [facility];  Service: General    CHOLECYSTECTOMY N/A 08/12/2023    Procedure: CHOLECYSTECTOMY;  Surgeon: Sammy Panchal DO;  [facility];  Service: General    COLON SURGERY      CT CYSTOGRAM  08/23/2023    HARTMANS PROCEDURE N/A 08/12/2023    Procedure: HARTMANS PROCEDURE;  Surgeon: Sammy Panchal DO;  [facility];  Service: General    LAPAROTOMY N/A 08/12/2023    Procedure: LAPAROTOMY EXPLORATORY, TAKEDOWN OF COLOVESICLE FISTULA, REPAIR OF EPIGASTRIC HERNIA;  Surgeon: Sammy Panchal DO;  [facility];  Service: General       Family History   Problem Relation Age of Onset    No Known Problems Brother          Medications have  "been verified.        Objective   /99   Pulse 93   Temp 97.5 °F (36.4 °C)   Resp 18   Ht 4' 9\" (1.448 m)   Wt 78.5 kg (173 lb)   SpO2 99%   BMI 37.44 kg/m²        Physical Exam     Physical Exam  Constitutional:       Appearance: Normal appearance. She is well-developed.   HENT:      Head: Normocephalic and atraumatic.      Right Ear: Tympanic membrane, ear canal and external ear normal. There is no impacted cerumen.      Left Ear: Tympanic membrane, ear canal and external ear normal. There is no impacted cerumen.      Nose: Nose normal.      Mouth/Throat:      Pharynx: No oropharyngeal exudate.   Cardiovascular:      Rate and Rhythm: Normal rate and regular rhythm.      Heart sounds: Normal heart sounds.   Pulmonary:      Effort: Pulmonary effort is normal. No respiratory distress.      Breath sounds: Normal breath sounds. No wheezing or rales.   Chest:      Chest wall: No tenderness.   Musculoskeletal:      Cervical back: Normal range of motion and neck supple.   Lymphadenopathy:      Cervical: No cervical adenopathy.   Neurological:      Mental Status: She is alert.                 "

## 2025-05-09 ENCOUNTER — OFFICE VISIT (OUTPATIENT)
Dept: FAMILY MEDICINE CLINIC | Facility: CLINIC | Age: 65
End: 2025-05-09
Payer: MEDICARE

## 2025-05-09 VITALS
HEIGHT: 57 IN | TEMPERATURE: 97.5 F | RESPIRATION RATE: 16 BRPM | WEIGHT: 174 LBS | BODY MASS INDEX: 37.54 KG/M2 | HEART RATE: 72 BPM | OXYGEN SATURATION: 94 % | SYSTOLIC BLOOD PRESSURE: 164 MMHG | DIASTOLIC BLOOD PRESSURE: 100 MMHG

## 2025-05-09 DIAGNOSIS — Z12.31 ENCOUNTER FOR SCREENING MAMMOGRAM FOR BREAST CANCER: ICD-10-CM

## 2025-05-09 DIAGNOSIS — I10 PRIMARY HYPERTENSION: Primary | ICD-10-CM

## 2025-05-09 PROCEDURE — 99213 OFFICE O/P EST LOW 20 MIN: CPT | Performed by: NURSE PRACTITIONER

## 2025-05-09 PROCEDURE — G2211 COMPLEX E/M VISIT ADD ON: HCPCS | Performed by: NURSE PRACTITIONER

## 2025-05-09 RX ORDER — AMLODIPINE BESYLATE 5 MG/1
5 TABLET ORAL DAILY
Qty: 90 TABLET | Refills: 1 | Status: SHIPPED | OUTPATIENT
Start: 2025-05-09

## 2025-05-09 RX ORDER — LOSARTAN POTASSIUM AND HYDROCHLOROTHIAZIDE 12.5; 1 MG/1; MG/1
1 TABLET ORAL DAILY
Qty: 90 TABLET | Refills: 1 | Status: SHIPPED | OUTPATIENT
Start: 2025-05-09

## 2025-05-09 NOTE — ASSESSMENT & PLAN NOTE
- Not well controlled.  -Continue losartan 100 mg daily and hydrochlorothiazide 12.5 mg daily. Sent combination pill Hyzaar to pharmacy.  - Will add amlodipine 5 mg daily. Discussed side effects.  - Continue to monitor blood pressure at home.   - Recommend follow up in 3 months or sooner if needed.   Orders:  •  losartan-hydrochlorothiazide (HYZAAR) 100-12.5 MG per tablet; Take 1 tablet by mouth daily  •  amLODIPine (NORVASC) 5 mg tablet; Take 1 tablet (5 mg total) by mouth daily

## 2025-05-09 NOTE — PROGRESS NOTES
Name: Mona Armendariz      : 1960      MRN: [id number]  Encounter Provider: FRANCISCA Theodore  Encounter Date: 2025   Encounter department: [facility]  :  Assessment & Plan  Primary hypertension  - Not well controlled.  -Continue losartan 100 mg daily and hydrochlorothiazide 12.5 mg daily. Sent combination pill Hyzaar to pharmacy.  - Will add amlodipine 5 mg daily. Discussed side effects.  - Continue to monitor blood pressure at home.   - Recommend follow up in 3 months or sooner if needed.   Orders:  •  losartan-hydrochlorothiazide (HYZAAR) 100-12.5 MG per tablet; Take 1 tablet by mouth daily  •  amLODIPine (NORVASC) 5 mg tablet; Take 1 tablet (5 mg total) by mouth daily    Encounter for screening mammogram for breast cancer    Orders:  •  Mammo screening bilateral w 3d and cad; Future           History of Present Illness   Patient presents to office today regarding follow up of hypertension. She is taking losartan 100 mg daily and hydrochlorothiazide 12.5 mg daily. Her blood pressure remains elevated. States she is compliant with her medications. Just finished a cup of coffee. Denies any other concerns or complaints today.           Review of Systems   Constitutional:  Negative for fatigue and fever.   HENT:  Negative for trouble swallowing.    Eyes:  Negative for visual disturbance.   Respiratory:  Negative for cough and shortness of breath.    Cardiovascular:  Negative for chest pain and palpitations.   Gastrointestinal:  Negative for abdominal pain and blood in stool.   Endocrine: Negative for cold intolerance and heat intolerance.   Genitourinary:  Negative for difficulty urinating and dysuria.   Musculoskeletal:  Negative for gait problem.   Skin:  Negative for rash.   Neurological:  Negative for dizziness, syncope and headaches.   Hematological:  Negative for adenopathy.   Psychiatric/Behavioral:  Negative for behavioral problems.        Objective   BP  "164/100 (BP Location: Left arm, Patient Position: Sitting, Cuff Size: Standard)   Pulse 72   Temp 97.5 °F (36.4 °C) (Tympanic)   Resp 16   Ht 4' 9\" (1.448 m)   Wt 78.9 kg (174 lb)   SpO2 94%   BMI 37.65 kg/m²      Physical Exam  Vitals and nursing note reviewed.   Constitutional:       Appearance: Normal appearance. She is well-developed.   HENT:      Head: Normocephalic and atraumatic.      Right Ear: External ear normal.      Left Ear: External ear normal.   Eyes:      Conjunctiva/sclera: Conjunctivae normal.   Cardiovascular:      Rate and Rhythm: Normal rate and regular rhythm.      Heart sounds: Normal heart sounds.   Pulmonary:      Effort: Pulmonary effort is normal.      Breath sounds: Normal breath sounds.   Musculoskeletal:         General: Normal range of motion.      Cervical back: Normal range of motion.   Skin:     General: Skin is warm and dry.   Neurological:      Mental Status: She is alert and oriented to person, place, and time.   Psychiatric:         Mood and Affect: Mood normal.         Behavior: Behavior normal.         "

## 2025-06-16 ENCOUNTER — OFFICE VISIT (OUTPATIENT)
Dept: URGENT CARE | Facility: MEDICAL CENTER | Age: 65
End: 2025-06-16
Payer: MEDICARE

## 2025-06-16 VITALS
TEMPERATURE: 98 F | SYSTOLIC BLOOD PRESSURE: 138 MMHG | WEIGHT: 174 LBS | BODY MASS INDEX: 37.54 KG/M2 | RESPIRATION RATE: 18 BRPM | DIASTOLIC BLOOD PRESSURE: 88 MMHG | OXYGEN SATURATION: 98 % | HEIGHT: 57 IN | HEART RATE: 100 BPM

## 2025-06-16 DIAGNOSIS — K05.219 PERIODONTAL ABSCESS: Primary | ICD-10-CM

## 2025-06-16 PROCEDURE — G0463 HOSPITAL OUTPT CLINIC VISIT: HCPCS | Performed by: PHYSICIAN ASSISTANT

## 2025-06-16 PROCEDURE — 99214 OFFICE O/P EST MOD 30 MIN: CPT | Performed by: PHYSICIAN ASSISTANT

## 2025-06-16 RX ORDER — CLINDAMYCIN HYDROCHLORIDE 300 MG/1
300 CAPSULE ORAL 4 TIMES DAILY
Qty: 28 CAPSULE | Refills: 0 | Status: SHIPPED | OUTPATIENT
Start: 2025-06-16 | End: 2025-06-23

## 2025-06-16 NOTE — PROGRESS NOTES
"  [facility]        NAME: Mona Armendariz is a 65 y.o. female  : 1960    MRN: [id number]  DATE: 2025  TIME: 6:43 PM    Assessment and Plan   Periodontal abscess [K05.219]  1. Periodontal abscess  clindamycin (CLEOCIN) 300 MG capsule            Patient Instructions     Periodontal abscess  Clindamycin as directed  Follow up with PCP in 3-5 days.  Proceed to  ER if symptoms worsen.    Chief Complaint     Chief Complaint   Patient presents with    Earache           Jaw Pain    Dental Pain     Left sided dental pain radiating towards the ear and jaw; states she is trying to get into oral surgeon but is booked out for months         History of Present Illness       65-year-old female who presents complaining of pain that starts in the ear and radiates down the left side of her jaw x 3 days.  Patient states that she had similar symptoms in the past for which she was treated with antibiotics.  Denies fever, chills, nausea, vomiting.    Earache     Dental Pain         Review of Systems   Review of Systems   HENT:  Positive for dental problem and ear pain.          Current Medications     Current Medications[1]    Current Allergies     Allergies as of 2025 - Reviewed 2025   Allergen Reaction Noted    Acetaminophen Hives 2018    Penicillins Hives 2018            The following portions of the patient's history were reviewed and updated as appropriate: allergies, current medications, past family history, past medical history, past social history, past surgical history and problem list.     Past Medical History[2]    Past Surgical History[3]    Family History[4]      Medications have been verified.        Objective   /88   Pulse 100   Temp 98 °F (36.7 °C)   Resp 18   Ht 4' 9\" (1.448 m)   Wt 78.9 kg (174 lb)   SpO2 98%   BMI 37.65 kg/m²        Physical Exam     Physical Exam  Constitutional:       Appearance: She is well-developed.   HENT:      Head: " Normocephalic and atraumatic.      Right Ear: External ear normal.      Left Ear: External ear normal.      Nose: Nose normal.      Mouth/Throat:      Lips: Pink.      Mouth: Mucous membranes are moist.      Dentition: Abnormal dentition. Does not have dentures. Dental tenderness and dental caries present. No gingival swelling.      Pharynx: Oropharynx is clear. No oropharyngeal exudate.     Cardiovascular:      Rate and Rhythm: Normal rate and regular rhythm.      Heart sounds: Normal heart sounds.   Pulmonary:      Effort: Pulmonary effort is normal. No respiratory distress.      Breath sounds: Normal breath sounds. No wheezing or rales.   Chest:      Chest wall: No tenderness.   Abdominal:      General: Bowel sounds are normal.     Musculoskeletal:      Cervical back: Normal range of motion and neck supple.   Lymphadenopathy:      Cervical: No cervical adenopathy.                        [1]   Current Outpatient Medications:     clindamycin (CLEOCIN) 300 MG capsule, Take 1 capsule (300 mg total) by mouth 4 (four) times a day for 7 days, Disp: 28 capsule, Rfl: 0    amLODIPine (NORVASC) 5 mg tablet, Take 1 tablet (5 mg total) by mouth daily, Disp: 90 tablet, Rfl: 1    baclofen 20 mg tablet, Take 1 tablet (20 mg total) by mouth 3 (three) times a day (Patient not taking: Reported on 3/5/2025), Disp: 30 tablet, Rfl: 0    hydroCHLOROthiazide 12.5 mg tablet, Take 1 tablet (12.5 mg total) by mouth daily, Disp: 90 tablet, Rfl: 0    losartan (COZAAR) 100 MG tablet, Take 1 tablet (100 mg total) by mouth daily, Disp: 90 tablet, Rfl: 1    losartan-hydrochlorothiazide (HYZAAR) 100-12.5 MG per tablet, Take 1 tablet by mouth daily, Disp: 90 tablet, Rfl: 1    methylPREDNISolone 4 MG tablet therapy pack, Use as directed on package (Patient not taking: Reported on 5/9/2025), Disp: 21 tablet, Rfl: 0    Ostomy Supplies MISC, Use in the morning Please provide necessary pouches and other ostomy supplies for patient's colostomy, Disp: 90  each, Rfl: 10    Ostomy Supplies MISC, Use in the morning Please provide necessary osseous some applies including pouches and other material required for colostomy, Disp: 90 each, Rfl: 10  [2]   Past Medical History:  Diagnosis Date    Breathing difficulty     Hypertension    [3]   Past Surgical History:  Procedure Laterality Date    APPENDECTOMY N/A 08/12/2023    Procedure: APPENDECTOMY;  Surgeon: Sammy Panchal DO;  [facility];  Service: General    CHOLECYSTECTOMY N/A 08/12/2023    Procedure: CHOLECYSTECTOMY;  Surgeon: Sammy Panchal DO;  [facility];  Service: General    COLON SURGERY      CT CYSTOGRAM  08/23/2023    HARTMANS PROCEDURE N/A 08/12/2023    Procedure: HARTMANS PROCEDURE;  Surgeon: Sammy Panchal DO;  [facility];  Service: General    LAPAROTOMY N/A 08/12/2023    Procedure: LAPAROTOMY EXPLORATORY, TAKEDOWN OF COLOVESICLE FISTULA, REPAIR OF EPIGASTRIC HERNIA;  Surgeon: Sammy Panchal DO;  [facility];  Service: General   [4]   Family History  Problem Relation Name Age of Onset    No Known Problems Brother

## 2025-06-16 NOTE — PATIENT INSTRUCTIONS
Periodontal abscess  Clindamycin as directed  Follow up with PCP in 3-5 days.  Proceed to  ER if symptoms worsen.

## (undated) DEVICE — SUT MONOCRYL 4-0 PS-2 18 IN Y496G

## (undated) DEVICE — FISH VISCERAL RETAINER LG

## (undated) DEVICE — DRAIN SPONGES,6 PLY: Brand: EXCILON

## (undated) DEVICE — LIGACLIP MCA MULTIPLE CLIP APPLIERS, 20 MEDIUM CLIPS: Brand: LIGACLIP

## (undated) DEVICE — TOWEL SURG XR DETECT GREEN STRL RFD

## (undated) DEVICE — TRAY FOLEY 16FR URIMETER SURESTEP

## (undated) DEVICE — NEEDLE HYPO 22G X 1-1/2 IN

## (undated) DEVICE — SUT ETHILON 3-0 FSLX 30 IN 1673H

## (undated) DEVICE — PROXIMATE RELOADABLE LINEAR CUTTER WITH SAFETY LOCK-OUT, 75MM: Brand: PROXIMATE

## (undated) DEVICE — SUT SILK 3-0 SH 30 IN K832H

## (undated) DEVICE — TUBING SUCTION 5MM X 12 FT

## (undated) DEVICE — SUT SILK 2-0 SH 30 IN K833H

## (undated) DEVICE — UNDYED BRAIDED (POLYGLACTIN 910), SYNTHETIC ABSORBABLE SUTURE: Brand: COATED VICRYL

## (undated) DEVICE — SEPRA FILM 6 X 5

## (undated) DEVICE — SUT SILK 3-0 18 IN A184H

## (undated) DEVICE — ADHESIVE SKIN HIGH VISCOSITY EXOFIN 1ML

## (undated) DEVICE — SUT VICRYL 0 REEL 54 IN J287G

## (undated) DEVICE — DECANTER: Brand: UNBRANDED

## (undated) DEVICE — SUT PDS II 1 CTX 36 IN Z371T

## (undated) DEVICE — SUT PDS II 3-0 SH 27 IN Z316H

## (undated) DEVICE — SUT PROLENE 2-0 SH 30 IN 8833H

## (undated) DEVICE — SUT SILK 0 SH 30 IN K834H

## (undated) DEVICE — SUT ETHILON 3-0 PS-1 18 IN 1663H

## (undated) DEVICE — SUT VICRYL 1 CT-1 27 IN J261H

## (undated) DEVICE — JP CHANNEL DRAIN 19FR, FULL FLUTES: Brand: JACKSON-PRATT

## (undated) DEVICE — PLUMEPEN PRO 10FT

## (undated) DEVICE — POOLE SUCTION HANDLE: Brand: CARDINAL HEALTH

## (undated) DEVICE — BETHLEHEM UNIVERSAL MINOR GEN: Brand: CARDINAL HEALTH

## (undated) DEVICE — ROSEBUD DISSECTORS: Brand: DEROYAL

## (undated) DEVICE — BULB SYRINGE,IRRIGATION WITH PROTECTIVE CAP: Brand: DOVER

## (undated) DEVICE — PROXIMATE LINEAR CUTTER RELOAD, BLUE, 75MM: Brand: PROXIMATE

## (undated) DEVICE — GLOVE SRG BIOGEL ORTHOPEDIC 8

## (undated) DEVICE — BETHLEHEM MAJOR GENERAL PACK: Brand: CARDINAL HEALTH

## (undated) DEVICE — ENSEAL 20 CM SHAFT, LARGE JAW: Brand: ENSEAL X1

## (undated) DEVICE — SUT VICRYL 3-0 SH 27 IN J416H

## (undated) DEVICE — SUT VICRYL 2-0 REEL 54 IN J286G

## (undated) DEVICE — CHLORAPREP HI-LITE 26ML ORANGE

## (undated) DEVICE — ECHELON CONTOUR W/ BLUE RELOAD: Brand: ECHELON

## (undated) DEVICE — GAUZE SPONGES,16 PLY: Brand: CURITY

## (undated) DEVICE — ECHELON CONTOUR GST RELOAD BLUE: Brand: ECHELON

## (undated) DEVICE — SUT VICRYL 0 CT-1 27 IN J260H

## (undated) DEVICE — PROXIMATE SKIN STAPLERS (35 WIDE) CONTAINS 35 STAINLESS STEEL STAPLES (FIXED HEAD): Brand: PROXIMATE

## (undated) DEVICE — INTENDED FOR TISSUE SEPARATION, AND OTHER PROCEDURES THAT REQUIRE A SHARP SURGICAL BLADE TO PUNCTURE OR CUT.: Brand: BARD-PARKER SAFETY BLADES SIZE 15, STERILE

## (undated) DEVICE — ABDOMINAL PAD: Brand: DERMACEA

## (undated) DEVICE — SUT VICRYL 2-0 SH 27 IN UNDYED J417H

## (undated) DEVICE — 3M™ STERI-STRIP™ REINFORCED ADHESIVE SKIN CLOSURES, R1547, 1/2 IN X 4 IN (12 MM X 100 MM), 6 STRIPS/ENVELOPE: Brand: 3M™ STERI-STRIP™

## (undated) DEVICE — SUT PDS II 1 CT-1 27 IN Z347H

## (undated) DEVICE — JACKSON-PRATT 100CC BULB RESERVOIR: Brand: CARDINAL HEALTH

## (undated) DEVICE — INTENDED FOR TISSUE SEPARATION, AND OTHER PROCEDURES THAT REQUIRE A SHARP SURGICAL BLADE TO PUNCTURE OR CUT.: Brand: BARD-PARKER SAFETY BLADES SIZE 10, STERILE